# Patient Record
Sex: MALE | Race: WHITE | NOT HISPANIC OR LATINO | URBAN - METROPOLITAN AREA
[De-identification: names, ages, dates, MRNs, and addresses within clinical notes are randomized per-mention and may not be internally consistent; named-entity substitution may affect disease eponyms.]

---

## 2017-03-05 ENCOUNTER — INPATIENT (INPATIENT)
Facility: HOSPITAL | Age: 72
LOS: 2 days | Discharge: HOME | End: 2017-03-08
Attending: HOSPITALIST

## 2017-04-14 ENCOUNTER — INPATIENT (INPATIENT)
Facility: HOSPITAL | Age: 72
LOS: 0 days | Discharge: HOME | End: 2017-04-15
Attending: HOSPITALIST

## 2017-05-18 PROBLEM — Z00.00 ENCOUNTER FOR PREVENTIVE HEALTH EXAMINATION: Status: ACTIVE | Noted: 2017-05-18

## 2017-06-27 DIAGNOSIS — I25.10 ATHEROSCLEROTIC HEART DISEASE OF NATIVE CORONARY ARTERY WITHOUT ANGINA PECTORIS: ICD-10-CM

## 2017-06-27 DIAGNOSIS — R63.4 ABNORMAL WEIGHT LOSS: ICD-10-CM

## 2017-06-27 DIAGNOSIS — R26.81 UNSTEADINESS ON FEET: ICD-10-CM

## 2017-06-27 DIAGNOSIS — Z86.79 PERSONAL HISTORY OF OTHER DISEASES OF THE CIRCULATORY SYSTEM: ICD-10-CM

## 2017-06-27 DIAGNOSIS — E78.5 HYPERLIPIDEMIA, UNSPECIFIED: ICD-10-CM

## 2017-06-27 DIAGNOSIS — R42 DIZZINESS AND GIDDINESS: ICD-10-CM

## 2017-06-27 DIAGNOSIS — Z95.5 PRESENCE OF CORONARY ANGIOPLASTY IMPLANT AND GRAFT: ICD-10-CM

## 2017-06-27 DIAGNOSIS — I25.2 OLD MYOCARDIAL INFARCTION: ICD-10-CM

## 2017-06-27 DIAGNOSIS — R11.2 NAUSEA WITH VOMITING, UNSPECIFIED: ICD-10-CM

## 2017-06-27 DIAGNOSIS — I10 ESSENTIAL (PRIMARY) HYPERTENSION: ICD-10-CM

## 2017-06-28 DIAGNOSIS — Z79.82 LONG TERM (CURRENT) USE OF ASPIRIN: ICD-10-CM

## 2017-06-28 DIAGNOSIS — K56.7 ILEUS, UNSPECIFIED: ICD-10-CM

## 2017-06-28 DIAGNOSIS — E78.5 HYPERLIPIDEMIA, UNSPECIFIED: ICD-10-CM

## 2017-06-28 DIAGNOSIS — A08.4 VIRAL INTESTINAL INFECTION, UNSPECIFIED: ICD-10-CM

## 2017-06-28 DIAGNOSIS — I25.2 OLD MYOCARDIAL INFARCTION: ICD-10-CM

## 2017-06-28 DIAGNOSIS — K56.60 UNSPECIFIED INTESTINAL OBSTRUCTION: ICD-10-CM

## 2017-06-28 DIAGNOSIS — I10 ESSENTIAL (PRIMARY) HYPERTENSION: ICD-10-CM

## 2017-06-28 DIAGNOSIS — K58.9 IRRITABLE BOWEL SYNDROME WITHOUT DIARRHEA: ICD-10-CM

## 2017-09-05 ENCOUNTER — APPOINTMENT (OUTPATIENT)
Dept: CARDIOLOGY | Facility: CLINIC | Age: 72
End: 2017-09-05

## 2017-09-05 VITALS
BODY MASS INDEX: 28.06 KG/M2 | HEIGHT: 70 IN | SYSTOLIC BLOOD PRESSURE: 130 MMHG | DIASTOLIC BLOOD PRESSURE: 80 MMHG | HEART RATE: 76 BPM | WEIGHT: 196 LBS

## 2017-09-05 RX ORDER — ASPIRIN 81 MG
81 TABLET, DELAYED RELEASE (ENTERIC COATED) ORAL DAILY
Refills: 0 | Status: ACTIVE | COMMUNITY

## 2017-12-14 ENCOUNTER — MEDICATION RENEWAL (OUTPATIENT)
Age: 72
End: 2017-12-14

## 2018-01-12 ENCOUNTER — MEDICATION RENEWAL (OUTPATIENT)
Age: 73
End: 2018-01-12

## 2018-01-23 ENCOUNTER — OUTPATIENT (OUTPATIENT)
Dept: OUTPATIENT SERVICES | Facility: HOSPITAL | Age: 73
LOS: 1 days | Discharge: HOME | End: 2018-01-23

## 2018-01-23 DIAGNOSIS — R10.9 UNSPECIFIED ABDOMINAL PAIN: ICD-10-CM

## 2018-02-04 DIAGNOSIS — K56.0 PARALYTIC ILEUS: ICD-10-CM

## 2018-02-04 DIAGNOSIS — I10 ESSENTIAL (PRIMARY) HYPERTENSION: ICD-10-CM

## 2018-02-04 DIAGNOSIS — R07.9 CHEST PAIN, UNSPECIFIED: ICD-10-CM

## 2018-02-04 DIAGNOSIS — E87.0 HYPEROSMOLALITY AND HYPERNATREMIA: ICD-10-CM

## 2018-02-04 DIAGNOSIS — E78.5 HYPERLIPIDEMIA, UNSPECIFIED: ICD-10-CM

## 2018-03-20 ENCOUNTER — OUTPATIENT (OUTPATIENT)
Dept: OUTPATIENT SERVICES | Facility: HOSPITAL | Age: 73
LOS: 1 days | Discharge: HOME | End: 2018-03-20

## 2018-03-20 DIAGNOSIS — M25.511 PAIN IN RIGHT SHOULDER: ICD-10-CM

## 2018-03-20 DIAGNOSIS — M25.512 PAIN IN LEFT SHOULDER: ICD-10-CM

## 2018-03-31 ENCOUNTER — OUTPATIENT (OUTPATIENT)
Dept: OUTPATIENT SERVICES | Facility: HOSPITAL | Age: 73
LOS: 1 days | Discharge: HOME | End: 2018-03-31

## 2018-03-31 DIAGNOSIS — R52 PAIN, UNSPECIFIED: ICD-10-CM

## 2018-05-10 ENCOUNTER — APPOINTMENT (OUTPATIENT)
Dept: CARDIOLOGY | Facility: CLINIC | Age: 73
End: 2018-05-10

## 2018-05-10 VITALS
SYSTOLIC BLOOD PRESSURE: 160 MMHG | WEIGHT: 195 LBS | HEART RATE: 61 BPM | BODY MASS INDEX: 27.92 KG/M2 | DIASTOLIC BLOOD PRESSURE: 70 MMHG | HEIGHT: 70 IN

## 2018-05-10 DIAGNOSIS — Z87.891 PERSONAL HISTORY OF NICOTINE DEPENDENCE: ICD-10-CM

## 2018-10-15 ENCOUNTER — EMERGENCY (EMERGENCY)
Facility: HOSPITAL | Age: 73
LOS: 0 days | Discharge: HOME | End: 2018-10-15
Attending: EMERGENCY MEDICINE | Admitting: EMERGENCY MEDICINE

## 2018-10-15 VITALS
SYSTOLIC BLOOD PRESSURE: 166 MMHG | WEIGHT: 197.98 LBS | RESPIRATION RATE: 18 BRPM | TEMPERATURE: 97 F | OXYGEN SATURATION: 100 % | HEART RATE: 61 BPM | DIASTOLIC BLOOD PRESSURE: 78 MMHG

## 2018-10-15 DIAGNOSIS — Z79.899 OTHER LONG TERM (CURRENT) DRUG THERAPY: ICD-10-CM

## 2018-10-15 DIAGNOSIS — Z79.82 LONG TERM (CURRENT) USE OF ASPIRIN: ICD-10-CM

## 2018-10-15 DIAGNOSIS — R42 DIZZINESS AND GIDDINESS: ICD-10-CM

## 2018-10-15 DIAGNOSIS — R11.10 VOMITING, UNSPECIFIED: ICD-10-CM

## 2018-10-15 LAB
ALBUMIN SERPL ELPH-MCNC: 4.1 G/DL — SIGNIFICANT CHANGE UP (ref 3.5–5.2)
ALP SERPL-CCNC: 101 U/L — SIGNIFICANT CHANGE UP (ref 30–115)
ALT FLD-CCNC: 12 U/L — SIGNIFICANT CHANGE UP (ref 0–41)
ANION GAP SERPL CALC-SCNC: 12 MMOL/L — SIGNIFICANT CHANGE UP (ref 7–14)
APTT BLD: 29.6 SEC — SIGNIFICANT CHANGE UP (ref 27–39.2)
AST SERPL-CCNC: 18 U/L — SIGNIFICANT CHANGE UP (ref 0–41)
BASOPHILS # BLD AUTO: 0.06 K/UL — SIGNIFICANT CHANGE UP (ref 0–0.2)
BASOPHILS NFR BLD AUTO: 0.6 % — SIGNIFICANT CHANGE UP (ref 0–1)
BILIRUB DIRECT SERPL-MCNC: 0.3 MG/DL — HIGH (ref 0–0.2)
BILIRUB INDIRECT FLD-MCNC: 0.7 MG/DL — SIGNIFICANT CHANGE UP (ref 0.2–1.2)
BILIRUB SERPL-MCNC: 1 MG/DL — SIGNIFICANT CHANGE UP (ref 0.2–1.2)
BUN SERPL-MCNC: 14 MG/DL — SIGNIFICANT CHANGE UP (ref 10–20)
CALCIUM SERPL-MCNC: 9.7 MG/DL — SIGNIFICANT CHANGE UP (ref 8.5–10.1)
CHLORIDE SERPL-SCNC: 104 MMOL/L — SIGNIFICANT CHANGE UP (ref 98–110)
CO2 SERPL-SCNC: 26 MMOL/L — SIGNIFICANT CHANGE UP (ref 17–32)
CREAT SERPL-MCNC: 1.3 MG/DL — SIGNIFICANT CHANGE UP (ref 0.7–1.5)
EOSINOPHIL # BLD AUTO: 0.19 K/UL — SIGNIFICANT CHANGE UP (ref 0–0.7)
EOSINOPHIL NFR BLD AUTO: 1.9 % — SIGNIFICANT CHANGE UP (ref 0–8)
GLUCOSE SERPL-MCNC: 148 MG/DL — HIGH (ref 70–99)
HCT VFR BLD CALC: 48.4 % — SIGNIFICANT CHANGE UP (ref 42–52)
HGB BLD-MCNC: 16 G/DL — SIGNIFICANT CHANGE UP (ref 14–18)
IMM GRANULOCYTES NFR BLD AUTO: 0.6 % — HIGH (ref 0.1–0.3)
INR BLD: 1.11 RATIO — SIGNIFICANT CHANGE UP (ref 0.65–1.3)
LACTATE SERPL-SCNC: 1.3 MMOL/L — SIGNIFICANT CHANGE UP (ref 0.5–2.2)
LIDOCAIN IGE QN: 26 U/L — SIGNIFICANT CHANGE UP (ref 7–60)
LYMPHOCYTES # BLD AUTO: 2.2 K/UL — SIGNIFICANT CHANGE UP (ref 1.2–3.4)
LYMPHOCYTES # BLD AUTO: 21.9 % — SIGNIFICANT CHANGE UP (ref 20.5–51.1)
MAGNESIUM SERPL-MCNC: 2.2 MG/DL — SIGNIFICANT CHANGE UP (ref 1.8–2.4)
MCHC RBC-ENTMCNC: 29.7 PG — SIGNIFICANT CHANGE UP (ref 27–31)
MCHC RBC-ENTMCNC: 33.1 G/DL — SIGNIFICANT CHANGE UP (ref 32–37)
MCV RBC AUTO: 90 FL — SIGNIFICANT CHANGE UP (ref 80–94)
MONOCYTES # BLD AUTO: 0.71 K/UL — HIGH (ref 0.1–0.6)
MONOCYTES NFR BLD AUTO: 7.1 % — SIGNIFICANT CHANGE UP (ref 1.7–9.3)
NEUTROPHILS # BLD AUTO: 6.84 K/UL — HIGH (ref 1.4–6.5)
NEUTROPHILS NFR BLD AUTO: 67.9 % — SIGNIFICANT CHANGE UP (ref 42.2–75.2)
NRBC # BLD: 0 /100 WBCS — SIGNIFICANT CHANGE UP (ref 0–0)
PLATELET # BLD AUTO: 220 K/UL — SIGNIFICANT CHANGE UP (ref 130–400)
POTASSIUM SERPL-MCNC: 4.1 MMOL/L — SIGNIFICANT CHANGE UP (ref 3.5–5)
POTASSIUM SERPL-SCNC: 4.1 MMOL/L — SIGNIFICANT CHANGE UP (ref 3.5–5)
PROT SERPL-MCNC: 6.6 G/DL — SIGNIFICANT CHANGE UP (ref 6–8)
PROTHROM AB SERPL-ACNC: 12 SEC — SIGNIFICANT CHANGE UP (ref 9.95–12.87)
RBC # BLD: 5.38 M/UL — SIGNIFICANT CHANGE UP (ref 4.7–6.1)
RBC # FLD: 13.4 % — SIGNIFICANT CHANGE UP (ref 11.5–14.5)
SODIUM SERPL-SCNC: 142 MMOL/L — SIGNIFICANT CHANGE UP (ref 135–146)
TROPONIN T SERPL-MCNC: <0.01 NG/ML — SIGNIFICANT CHANGE UP
WBC # BLD: 10.06 K/UL — SIGNIFICANT CHANGE UP (ref 4.8–10.8)
WBC # FLD AUTO: 10.06 K/UL — SIGNIFICANT CHANGE UP (ref 4.8–10.8)

## 2018-10-15 RX ORDER — MECLIZINE HCL 12.5 MG
25 TABLET ORAL ONCE
Qty: 0 | Refills: 0 | Status: COMPLETED | OUTPATIENT
Start: 2018-10-15 | End: 2018-10-15

## 2018-10-15 RX ORDER — MECLIZINE HCL 12.5 MG
1 TABLET ORAL
Qty: 10 | Refills: 0 | OUTPATIENT
Start: 2018-10-15

## 2018-10-15 RX ORDER — SODIUM CHLORIDE 9 MG/ML
1000 INJECTION, SOLUTION INTRAVENOUS ONCE
Qty: 0 | Refills: 0 | Status: COMPLETED | OUTPATIENT
Start: 2018-10-15 | End: 2018-10-15

## 2018-10-15 RX ORDER — ONDANSETRON 8 MG/1
4 TABLET, FILM COATED ORAL ONCE
Qty: 0 | Refills: 0 | Status: COMPLETED | OUTPATIENT
Start: 2018-10-15 | End: 2018-10-15

## 2018-10-15 RX ADMIN — SODIUM CHLORIDE 1000 MILLILITER(S): 9 INJECTION, SOLUTION INTRAVENOUS at 16:17

## 2018-10-15 RX ADMIN — ONDANSETRON 4 MILLIGRAM(S): 8 TABLET, FILM COATED ORAL at 16:17

## 2018-10-15 RX ADMIN — Medication 25 MILLIGRAM(S): at 16:17

## 2018-10-15 NOTE — ED ADULT NURSE NOTE - NSIMPLEMENTINTERV_GEN_ALL_ED
Implemented All Fall with Harm Risk Interventions:  [facility] to call system. Call bell, personal items and telephone within reach. Instruct patient to call for assistance. Room bathroom lighting operational. Non-slip footwear when patient is off stretcher. Physically safe environment: no spills, clutter or unnecessary equipment. Stretcher in lowest position, wheels locked, appropriate side rails in place. Provide visual cue, wrist band, yellow gown, etc. Monitor gait and stability. Monitor for mental status changes and reorient to person, place, and time. Review medications for side effects contributing to fall risk. Reinforce activity limits and safety measures with patient and family. Provide visual clues: red socks.

## 2018-10-15 NOTE — ED PROVIDER NOTE - ATTENDING CONTRIBUTION TO CARE
sudden onset vertigo that occurred at rest lasted for few min with vomiting but no chest pain or headache, a month prior to this he had similar symptoms and was evaluated by neurology with dr rosales where he had a nml MRI/MRA/CT scan and tilt table test which was confirmed after discussion with dr rosales, now he states symptoms have resolved, on exam his gait is nml, nml cn, nml sensation and sternght, negative rhombergs and no n ystagmus, will get head ct given vomiting, check labs. if work up negative this isunlikely posterior stroke given recent extensive work up and patient to fu with dr rosales.

## 2018-10-15 NOTE — ED PROVIDER NOTE - MEDICAL DECISION MAKING DETAILS
evaluated vertigo, likely peripheral, extensive work up prior month, ct negative here and symptoms resolved.

## 2018-10-15 NOTE — ED PROVIDER NOTE - OBJECTIVE STATEMENT
74y/o M w/ hx of vertigo with work MRI/MRA/CT and tilt table by Dr. Neil-neuro presents from sudden onset vertigo occurred at rest lasted for few minutes with nb vomiting. no cp or sob or headachea.

## 2018-10-15 NOTE — ED PROVIDER NOTE - PHYSICAL EXAMINATION
CONSTITUTIONAL: Well-developed; well-nourished; in no acute distress, nontoxic appearing  SKIN: skin exam is warm and dry,  HEAD: Normocephalic; atraumatic.  EYES: PERRL, 3 mm bilateral, no nystagmus, EOM intact; conjunctiva and sclera clear.  ENT: MMM, no nasal congestion  NECK: Supple; non tender.  ROM intact.  CARD: S1, S2 normal, no murmur  RESP: No wheezes, rales or rhonchi. Good air movement bilaterally  ABD: soft; non-distended; non-tender. No Rebound, No guarding  EXT: Normal ROM. No cyanosis or edema. Dp Pulses intact.   NEURO: awake, alert, following commands, oriented, grossly unremarkable. No Focal deficits. GCS 15. gait is nml, nml cn 2-12, negative rhombers, no nystagmus, nml finger to nose,   PSYCH: Cooperative, appropriate.

## 2018-11-29 ENCOUNTER — APPOINTMENT (OUTPATIENT)
Dept: CARDIOLOGY | Facility: CLINIC | Age: 73
End: 2018-11-29

## 2018-11-29 VITALS
SYSTOLIC BLOOD PRESSURE: 140 MMHG | BODY MASS INDEX: 27.77 KG/M2 | HEIGHT: 70 IN | WEIGHT: 194 LBS | HEART RATE: 62 BPM | DIASTOLIC BLOOD PRESSURE: 80 MMHG

## 2018-11-29 PROBLEM — I21.9 ACUTE MYOCARDIAL INFARCTION, UNSPECIFIED: Chronic | Status: ACTIVE | Noted: 2018-10-15

## 2018-11-29 PROBLEM — E78.00 PURE HYPERCHOLESTEROLEMIA, UNSPECIFIED: Chronic | Status: ACTIVE | Noted: 2018-10-15

## 2018-11-29 NOTE — REASON FOR VISIT
[Initial Evaluation] : an initial evaluation of [FreeTextEntry2] : cad [FreeTextEntry1] : 1995 patient had an mi\par followed by dr. lopez\par last nuclear exam was january 2017\par last echo was done approx 1/2017\par pt has a lbbb\par carotid studiews showed mild disease\par denies chest pain\par class 2 sob\par no h/o chf\par on appropriate medical therapy\par \par s/p stent placement in lower abdomen\par \par no new complaints since last visit\par stable.\par \par no new cardiac symptoms\par pre-op for urological surgery\par low risk \par no further testing needed

## 2019-03-18 ENCOUNTER — EMERGENCY (EMERGENCY)
Facility: HOSPITAL | Age: 74
LOS: 0 days | Discharge: HOME | End: 2019-03-18
Attending: EMERGENCY MEDICINE | Admitting: EMERGENCY MEDICINE

## 2019-03-18 VITALS
WEIGHT: 190.04 LBS | SYSTOLIC BLOOD PRESSURE: 205 MMHG | RESPIRATION RATE: 19 BRPM | TEMPERATURE: 96 F | OXYGEN SATURATION: 98 % | DIASTOLIC BLOOD PRESSURE: 106 MMHG | HEART RATE: 79 BPM

## 2019-03-18 VITALS
DIASTOLIC BLOOD PRESSURE: 82 MMHG | OXYGEN SATURATION: 98 % | SYSTOLIC BLOOD PRESSURE: 178 MMHG | RESPIRATION RATE: 18 BRPM | TEMPERATURE: 97 F

## 2019-03-18 DIAGNOSIS — I25.2 OLD MYOCARDIAL INFARCTION: ICD-10-CM

## 2019-03-18 DIAGNOSIS — R20.0 ANESTHESIA OF SKIN: ICD-10-CM

## 2019-03-18 DIAGNOSIS — Z98.890 OTHER SPECIFIED POSTPROCEDURAL STATES: ICD-10-CM

## 2019-03-18 DIAGNOSIS — Z79.82 LONG TERM (CURRENT) USE OF ASPIRIN: ICD-10-CM

## 2019-03-18 DIAGNOSIS — R42 DIZZINESS AND GIDDINESS: ICD-10-CM

## 2019-03-18 DIAGNOSIS — I10 ESSENTIAL (PRIMARY) HYPERTENSION: ICD-10-CM

## 2019-03-18 DIAGNOSIS — Z87.891 PERSONAL HISTORY OF NICOTINE DEPENDENCE: ICD-10-CM

## 2019-03-18 DIAGNOSIS — Z79.899 OTHER LONG TERM (CURRENT) DRUG THERAPY: ICD-10-CM

## 2019-03-18 DIAGNOSIS — E78.00 PURE HYPERCHOLESTEROLEMIA, UNSPECIFIED: ICD-10-CM

## 2019-03-18 DIAGNOSIS — Z98.890 OTHER SPECIFIED POSTPROCEDURAL STATES: Chronic | ICD-10-CM

## 2019-03-18 LAB
ALBUMIN SERPL ELPH-MCNC: 3.9 G/DL — SIGNIFICANT CHANGE UP (ref 3.5–5.2)
ALP SERPL-CCNC: 91 U/L — SIGNIFICANT CHANGE UP (ref 30–115)
ALT FLD-CCNC: 12 U/L — SIGNIFICANT CHANGE UP (ref 0–41)
ANION GAP SERPL CALC-SCNC: 6 MMOL/L — LOW (ref 7–14)
AST SERPL-CCNC: 16 U/L — SIGNIFICANT CHANGE UP (ref 0–41)
BASOPHILS # BLD AUTO: 0.06 K/UL — SIGNIFICANT CHANGE UP (ref 0–0.2)
BASOPHILS NFR BLD AUTO: 0.8 % — SIGNIFICANT CHANGE UP (ref 0–1)
BILIRUB SERPL-MCNC: 0.5 MG/DL — SIGNIFICANT CHANGE UP (ref 0.2–1.2)
BUN SERPL-MCNC: 15 MG/DL — SIGNIFICANT CHANGE UP (ref 10–20)
CALCIUM SERPL-MCNC: 9.5 MG/DL — SIGNIFICANT CHANGE UP (ref 8.5–10.1)
CHLORIDE SERPL-SCNC: 104 MMOL/L — SIGNIFICANT CHANGE UP (ref 98–110)
CO2 SERPL-SCNC: 29 MMOL/L — SIGNIFICANT CHANGE UP (ref 17–32)
CREAT SERPL-MCNC: 1.2 MG/DL — SIGNIFICANT CHANGE UP (ref 0.7–1.5)
EOSINOPHIL # BLD AUTO: 0.16 K/UL — SIGNIFICANT CHANGE UP (ref 0–0.7)
EOSINOPHIL NFR BLD AUTO: 2 % — SIGNIFICANT CHANGE UP (ref 0–8)
GLUCOSE SERPL-MCNC: 106 MG/DL — HIGH (ref 70–99)
HCT VFR BLD CALC: 49.3 % — SIGNIFICANT CHANGE UP (ref 42–52)
HGB BLD-MCNC: 16 G/DL — SIGNIFICANT CHANGE UP (ref 14–18)
IMM GRANULOCYTES NFR BLD AUTO: 0.4 % — HIGH (ref 0.1–0.3)
LYMPHOCYTES # BLD AUTO: 1.58 K/UL — SIGNIFICANT CHANGE UP (ref 1.2–3.4)
LYMPHOCYTES # BLD AUTO: 20 % — LOW (ref 20.5–51.1)
MCHC RBC-ENTMCNC: 29.5 PG — SIGNIFICANT CHANGE UP (ref 27–31)
MCHC RBC-ENTMCNC: 32.5 G/DL — SIGNIFICANT CHANGE UP (ref 32–37)
MCV RBC AUTO: 90.8 FL — SIGNIFICANT CHANGE UP (ref 80–94)
MONOCYTES # BLD AUTO: 0.56 K/UL — SIGNIFICANT CHANGE UP (ref 0.1–0.6)
MONOCYTES NFR BLD AUTO: 7.1 % — SIGNIFICANT CHANGE UP (ref 1.7–9.3)
NEUTROPHILS # BLD AUTO: 5.5 K/UL — SIGNIFICANT CHANGE UP (ref 1.4–6.5)
NEUTROPHILS NFR BLD AUTO: 69.7 % — SIGNIFICANT CHANGE UP (ref 42.2–75.2)
NRBC # BLD: 0 /100 WBCS — SIGNIFICANT CHANGE UP (ref 0–0)
PLATELET # BLD AUTO: 194 K/UL — SIGNIFICANT CHANGE UP (ref 130–400)
POTASSIUM SERPL-MCNC: 4.3 MMOL/L — SIGNIFICANT CHANGE UP (ref 3.5–5)
POTASSIUM SERPL-SCNC: 4.3 MMOL/L — SIGNIFICANT CHANGE UP (ref 3.5–5)
PROT SERPL-MCNC: 6.4 G/DL — SIGNIFICANT CHANGE UP (ref 6–8)
RBC # BLD: 5.43 M/UL — SIGNIFICANT CHANGE UP (ref 4.7–6.1)
RBC # FLD: 13.5 % — SIGNIFICANT CHANGE UP (ref 11.5–14.5)
SODIUM SERPL-SCNC: 139 MMOL/L — SIGNIFICANT CHANGE UP (ref 135–146)
TROPONIN T SERPL-MCNC: <0.01 NG/ML — SIGNIFICANT CHANGE UP
TROPONIN T SERPL-MCNC: <0.01 NG/ML — SIGNIFICANT CHANGE UP
WBC # BLD: 7.89 K/UL — SIGNIFICANT CHANGE UP (ref 4.8–10.8)
WBC # FLD AUTO: 7.89 K/UL — SIGNIFICANT CHANGE UP (ref 4.8–10.8)

## 2019-03-18 RX ORDER — ATORVASTATIN CALCIUM 80 MG/1
1 TABLET, FILM COATED ORAL
Qty: 0 | Refills: 0 | COMMUNITY

## 2019-03-18 RX ORDER — OMEPRAZOLE 10 MG/1
1 CAPSULE, DELAYED RELEASE ORAL
Qty: 0 | Refills: 0 | COMMUNITY

## 2019-03-18 NOTE — ED PROVIDER NOTE - CARE PROVIDER_API CALL
Jeff Neil)  Neurology  [address] [place] [address]  Phone: [phone]  Fax: [phone]  Follow Up Time:     Ivan Lopez)  Cardiovascular Disease; Internal Medicine; Interventional Cardiology  [address] [place] [id number]  Phone: [phone]  Fax: [phone]  Follow Up Time:     Bartolome Guerin)  Internal Medicine  [address] [place] [address]  Phone: [phone]  Fax: [phone]  Follow Up Time:

## 2019-03-18 NOTE — ED ADULT NURSE NOTE - NSIMPLEMENTINTERV_GEN_ALL_ED
Implemented All Fall Risk Interventions:  [place] to call system. Call bell, personal items and telephone within reach. Instruct patient to call for assistance. Room bathroom lighting operational. Non-slip footwear when patient is off stretcher. Physically safe environment: no spills, clutter or unnecessary equipment. Stretcher in lowest position, wheels locked, appropriate side rails in place. Provide visual cue, wrist band, yellow gown, etc. Monitor gait and stability. Monitor for mental status changes and reorient to person, place, and time. Review medications for side effects contributing to fall risk. Reinforce activity limits and safety measures with patient and family.

## 2019-03-18 NOTE — ED PROVIDER NOTE - PLAN OF CARE
Resolution of symptoms. ct head shows age indeterminate infarct. Patient is already on lipitor 80mg and asa 81. CEx2 are negative. Advised to follow up with cardiology and neurology as outpatient.

## 2019-03-18 NOTE — ED PROVIDER NOTE - OBJECTIVE STATEMENT
74Y M with pmh of MI in 1993, HTN and dld presents to the ED with L arm numbness and lightheadedness since 5AM. As per patient the symptom started right after he woke up. He can not recall if he slept on his arm. The symptoms are associated with nausea and lightheadedness but he is able to ambulate. Patient denies any chest pain, sob, vomiting, vision problems, weakness or headache.

## 2019-03-18 NOTE — ED PROVIDER NOTE - CLINICAL SUMMARY MEDICAL DECISION MAKING FREE TEXT BOX
Patient presents with left arm numbness that has improved during intial evaluation patient symptoms abated.  He denies any chest pain or shortness of breath physical exam normal we obtained labs ct head negative discussed case with neurology. Patient presents with left arm numbness that has improved during intial evaluation patient symptoms abated.  He denies any chest pain or shortness of breath physical exam normal we obtained labs ct head showed age indeterminate lacunar infarct and multiple attempts were made to discussed case with neurology.

## 2019-03-18 NOTE — ED PROVIDER NOTE - CARE PLAN
Principal Discharge DX:	Numbness of arm  Goal:	Resolution of symptoms.  Assessment and plan of treatment:	ct head shows age indeterminate infarct. Patient is already on lipitor 80mg and asa 81. CEx2 are negative. Advised to follow up with cardiology and neurology as outpatient.

## 2019-03-18 NOTE — ED PROVIDER NOTE - CARE PROVIDERS DIRECT ADDRESSES
,DirectAddress_Unknown,[email or web address] [address].[facility]

## 2019-03-18 NOTE — ED PROVIDER NOTE - PHYSICAL EXAMINATION
GENERAL: NAD, speaks in full sentences, no signs of respiratory distress  HEAD:  Atraumatic, Normocephalic  EYES: EOMI, conjunctiva clear  CHEST/LUNG: Clear to auscultation bilaterally; No wheeze; No crackles; No accessory muscles used  HEART: Regular rate and rhythm; No murmurs;   ABDOMEN: Soft, Nontender, Nondistended; No guarding  EXTREMITIES:  No cyanosis or edema  PSYCH: AAOx3  NEUROLOGY: non-focal  SKIN: No rashes or lesions

## 2019-03-18 NOTE — ED PROVIDER NOTE - PROVIDER TOKENS
PROVIDER:[TOKEN:[[id number]]],PROVIDER:[TOKEN:[[id number]]],PROVIDER:[TOKEN:[[id number]]]

## 2019-03-18 NOTE — ED PROVIDER NOTE - PROGRESS NOTE DETAILS
Blood work shows no abnormalities. CXR has no acute cardiopulmonary abnormality. CT head shows age indeterminate lacunar infarct. Patient is already on Lipitor 80mg and ASA 81. Arm numbness has improved. BP has also come down to 167/84. Blood work shows no abnormalities. CXR has no acute cardiopulmonary abnormality. CT head shows age indeterminate lacunar infarct. Patient is already on Lipitor 80mg and ASA 81. Arm numbness has improved. BP has also come down to 167/84. Cardiac enzymes are negative x2. Will discharge patient. Blood work shows no abnormalities. CXR has no acute cardiopulmonary abnormality. CT head shows age indeterminate lacunar infarct. Patient is already on Lipitor 80mg and ASA 81. Arm numbness has improved. BP has also come down to 167/84. Cardiac enzymes are negative x2. Patient offered admission but he refused. Will discharge patient.

## 2019-03-18 NOTE — ED PROVIDER NOTE - ATTENDING CONTRIBUTION TO CARE
I was present for and supervised the key and critical aspects of the procedures performed during the care of the patient. Patient presents for evaluation of left arm numbness that has improved patient is currently asymptomatic he denies any headache, visual changes, chest pain, shortness of breath abdominal pain, back pain or focal deficits.    On physical exam he is nc/at perrla eomi oropharynx clear cta b/l, rrr s1s2 noted abd-soft nt ndbs+ ext from   Neuro- patient is awake alert with from, full strength, sensation intact, he is able move all extremities well no focal deficits noted.    A/P- we obtained ekg, labs, ct head, given patients history I am more concerned with potential cardiac cause rather than neuro.  I will continue to monitor.

## 2019-03-18 NOTE — ED PROVIDER NOTE - NS ED ROS FT
CONSTITUTIONAL: No weakness, fevers or chills  EYES/ENT: No visual changes;  lightheadedness    NECK: No pain or stiffness  RESPIRATORY: No cough, wheezing, hemoptysis; No shortness of breath  CARDIOVASCULAR: No chest pain or palpitations  GASTROINTESTINAL: No abdominal or epigastric pain. No nausea, vomiting, or hematemesis; No diarrhea or constipation.  GENITOURINARY: No dysuria, frequency or hematuria  NEUROLOGICAL: L arm numbness with shoulder pain  SKIN: No itching, rashes

## 2019-03-19 ENCOUNTER — MEDICATION RENEWAL (OUTPATIENT)
Age: 74
End: 2019-03-19

## 2019-04-02 RX ORDER — MELOXICAM 15 MG/1
1 TABLET ORAL
Qty: 30 | Refills: 1
Start: 2019-04-02 | End: 2019-05-31

## 2019-04-03 PROBLEM — I10 ESSENTIAL (PRIMARY) HYPERTENSION: Chronic | Status: ACTIVE | Noted: 2019-03-18

## 2019-04-09 ENCOUNTER — OUTPATIENT (OUTPATIENT)
Dept: OUTPATIENT SERVICES | Facility: HOSPITAL | Age: 74
LOS: 1 days | Discharge: HOME | End: 2019-04-09

## 2019-04-09 DIAGNOSIS — Z98.890 OTHER SPECIFIED POSTPROCEDURAL STATES: Chronic | ICD-10-CM

## 2019-04-09 DIAGNOSIS — M54.89 OTHER DORSALGIA: ICD-10-CM

## 2019-04-12 NOTE — ED PROVIDER NOTE - NS ED ROS FT
Medications:  Continuous Infusions:  Scheduled Meds:   aspirin  81 mg Oral Daily    atorvastatin  80 mg Oral Daily    clopidogrel  75 mg Oral Daily    [START ON 4/12/2019] losartan  50 mg Oral Daily    [START ON 4/12/2019] metoprolol succinate  25 mg Oral Daily    mupirocin  1 g Nasal BID     PRN Meds:acetaminophen, albuterol-ipratropium, hydrALAZINE, labetalol, oxyCODONE, oxyCODONE     Objective:     Vital Signs (Most Recent):  Temp: 98.1 °F (36.7 °C) (04/11/19 1910)  Pulse: 76 (04/11/19 1941)  Resp: 18 (04/11/19 1910)  BP: (!) 155/70 (04/11/19 1910)  SpO2: (!) 91 % (04/11/19 1941) Vital Signs (24h Range):  Temp:  [97.3 °F (36.3 °C)-98.7 °F (37.1 °C)] 98.1 °F (36.7 °C)  Pulse:  [53-81] 76  Resp:  [14-23] 18  SpO2:  [87 %-95 %] 91 %  BP: ()/(48-70) 155/70  Arterial Line BP: ()/(41-51) 92/41     Date 04/11/19 0700 - 04/12/19 0659   Shift 0733-5600 1110-2752 4417-4132 24 Hour Total   INTAKE   P.O. 290 60  350   I.V.(mL/kg) 550(9.9)   550(9.9)   Shift Total(mL/kg) 840(15.2) 60(1.1)  900(16.3)   OUTPUT   Urine(mL/kg/hr) 10(0) 150  160   Shift Total(mL/kg) 10(0.2) 150(2.7)  160(2.9)   Weight (kg) 55.3 55.3 55.3 55.3       Physical Exam   Constitutional: She is oriented to person, place, and time. She appears well-developed.   Neck: Normal range of motion. Neck supple. No JVD present. No tracheal deviation present.   Surgical island dressing to right neck clean, dry, and intact   Cardiovascular: Normal rate and regular rhythm.   Pulmonary/Chest: Breath sounds normal. No respiratory distress.   Abdominal: Soft. She exhibits no distension and no mass. There is no tenderness. There is no rebound and no guarding.   Musculoskeletal: She exhibits no edema.   Neurological: She is alert and oriented to person, place, and time. No cranial nerve deficit or sensory deficit.   Skin: Skin is warm and dry.       Significant Labs:  CBC:   Recent Labs   Lab 04/11/19  0539   WBC 7.88   RBC 3.54*   HGB 10.0*   HCT 32.3*       MCV 91   MCH 28.2   MCHC 31.0*     CMP:   Recent Labs   Lab 04/11/19  0539   *   CALCIUM 8.1*      K 3.8   CO2 21*   *   BUN 14   CREATININE 1.3       Significant Diagnostics:  I have reviewed all pertinent imaging results/findings within the past 24 hours.   Constitutional: (-) fever  Eyes/ENT: (-) blurry vision, (-) epistaxis  Cardiovascular: (-) chest pain, (-) syncope  Respiratory: (-) cough, (-) shortness of breath  Gastrointestinal:+ vomiting, (-) diarrhea  Musculoskeletal: (-) neck pain, (-) back pain, (-) joint pain  Integumentary: (-) rash, (-) edema  Neurological: (-) headache, (-) altered mental status +vertigo  Psychiatric: (-) hallucinations  Allergic/Immunologic: (-) pruritus

## 2019-05-01 ENCOUNTER — RX RENEWAL (OUTPATIENT)
Age: 74
End: 2019-05-01

## 2019-09-18 ENCOUNTER — OTHER (OUTPATIENT)
Age: 74
End: 2019-09-18

## 2019-09-18 ENCOUNTER — APPOINTMENT (OUTPATIENT)
Dept: CARDIOLOGY | Facility: CLINIC | Age: 74
End: 2019-09-18
Payer: MEDICARE

## 2019-09-18 VITALS
SYSTOLIC BLOOD PRESSURE: 136 MMHG | WEIGHT: 189 LBS | HEIGHT: 70 IN | BODY MASS INDEX: 27.06 KG/M2 | HEART RATE: 52 BPM | DIASTOLIC BLOOD PRESSURE: 80 MMHG

## 2019-09-18 PROCEDURE — 99214 OFFICE O/P EST MOD 30 MIN: CPT

## 2019-09-18 PROCEDURE — 93000 ELECTROCARDIOGRAM COMPLETE: CPT

## 2019-09-18 RX ORDER — OMEPRAZOLE 40 MG/1
40 CAPSULE, DELAYED RELEASE ORAL
Qty: 90 | Refills: 3 | Status: ACTIVE | COMMUNITY
Start: 1900-01-01 | End: 1900-01-01

## 2019-09-18 NOTE — PHYSICAL EXAM
[General Appearance - Well Developed] : well developed [Normal Appearance] : normal appearance [Well Groomed] : well groomed [General Appearance - Well Nourished] : well nourished [No Deformities] : no deformities [General Appearance - In No Acute Distress] : no acute distress [Eyelids - No Xanthelasma] : the eyelids demonstrated no xanthelasmas [Normal Conjunctiva] : the conjunctiva exhibited no abnormalities [Normal Oral Mucosa] : normal oral mucosa [No Oral Pallor] : no oral pallor [No Oral Cyanosis] : no oral cyanosis [Heart Rate And Rhythm] : heart rate and rhythm were normal [Heart Sounds] : normal S1 and S2 [Murmurs] : no murmurs present [Exaggerated Use Of Accessory Muscles For Inspiration] : no accessory muscle use [Respiration, Rhythm And Depth] : normal respiratory rhythm and effort [Auscultation Breath Sounds / Voice Sounds] : lungs were clear to auscultation bilaterally [Abdomen Soft] : soft [Abdomen Tenderness] : non-tender [Abdomen Mass (___ Cm)] : no abdominal mass palpated [Abnormal Walk] : normal gait [Nail Clubbing] : no clubbing of the fingernails [Gait - Sufficient For Exercise Testing] : the gait was sufficient for exercise testing [Petechial Hemorrhages (___cm)] : no petechial hemorrhages [Cyanosis, Localized] : no localized cyanosis [] : no rash [Skin Color & Pigmentation] : normal skin color and pigmentation [No Venous Stasis] : no venous stasis [Skin Lesions] : no skin lesions [No Xanthoma] : no  xanthoma was observed [No Skin Ulcers] : no skin ulcer [Oriented To Time, Place, And Person] : oriented to person, place, and time [Mood] : the mood was normal [No Anxiety] : not feeling anxious [Affect] : the affect was normal

## 2019-09-18 NOTE — REASON FOR VISIT
[Initial Evaluation] : an initial evaluation of [FreeTextEntry2] : cad [FreeTextEntry1] : 1995 patient had an mi\par followed by dr. lopez\par last nuclear exam was january 2017\par last echo was done approx 1/2017\par pt has a lbbb\par carotid studiews showed mild disease\par denies chest pain\par class 2 sob\par no h/o chf\par on appropriate medical therapy\par \par s/p stent placement in lower abdomen\par \par no new complaints since last visit\par stable.\par \par no new cardiac symptoms\par to see dr. fajardo for gi problem\par seeing dr. coronel for prostate

## 2020-01-01 ENCOUNTER — APPOINTMENT (OUTPATIENT)
Dept: CARDIOLOGY | Facility: CLINIC | Age: 75
End: 2020-01-01

## 2020-01-01 ENCOUNTER — EMERGENCY (EMERGENCY)
Facility: HOSPITAL | Age: 75
LOS: 0 days | Discharge: HOME | End: 2020-08-18
Attending: EMERGENCY MEDICINE | Admitting: EMERGENCY MEDICINE
Payer: MEDICARE

## 2020-01-01 ENCOUNTER — APPOINTMENT (OUTPATIENT)
Dept: CARDIOLOGY | Facility: CLINIC | Age: 75
End: 2020-01-01
Payer: MEDICARE

## 2020-01-01 ENCOUNTER — INPATIENT (INPATIENT)
Facility: HOSPITAL | Age: 75
LOS: 17 days | End: 2020-11-28
Attending: INTERNAL MEDICINE | Admitting: INTERNAL MEDICINE
Payer: MEDICARE

## 2020-01-01 VITALS
WEIGHT: 190 LBS | BODY MASS INDEX: 27.26 KG/M2 | DIASTOLIC BLOOD PRESSURE: 80 MMHG | SYSTOLIC BLOOD PRESSURE: 142 MMHG | HEART RATE: 66 BPM | TEMPERATURE: 97.9 F

## 2020-01-01 VITALS
HEART RATE: 79 BPM | RESPIRATION RATE: 16 BRPM | HEIGHT: 70 IN | DIASTOLIC BLOOD PRESSURE: 89 MMHG | WEIGHT: 184.97 LBS | TEMPERATURE: 97 F | SYSTOLIC BLOOD PRESSURE: 169 MMHG | OXYGEN SATURATION: 97 %

## 2020-01-01 VITALS — WEIGHT: 195.11 LBS | HEIGHT: 70 IN

## 2020-01-01 VITALS — OXYGEN SATURATION: 87 % | SYSTOLIC BLOOD PRESSURE: 120 MMHG | HEART RATE: 108 BPM | DIASTOLIC BLOOD PRESSURE: 58 MMHG

## 2020-01-01 DIAGNOSIS — E66.9 OBESITY, UNSPECIFIED: ICD-10-CM

## 2020-01-01 DIAGNOSIS — Z79.82 LONG TERM (CURRENT) USE OF ASPIRIN: ICD-10-CM

## 2020-01-01 DIAGNOSIS — I25.2 OLD MYOCARDIAL INFARCTION: ICD-10-CM

## 2020-01-01 DIAGNOSIS — Z98.890 OTHER SPECIFIED POSTPROCEDURAL STATES: Chronic | ICD-10-CM

## 2020-01-01 DIAGNOSIS — I11.0 HYPERTENSIVE HEART DISEASE WITH HEART FAILURE: ICD-10-CM

## 2020-01-01 DIAGNOSIS — R04.0 EPISTAXIS: ICD-10-CM

## 2020-01-01 DIAGNOSIS — I25.10 ATHEROSCLEROTIC HEART DISEASE OF NATIVE CORONARY ARTERY WITHOUT ANGINA PECTORIS: ICD-10-CM

## 2020-01-01 DIAGNOSIS — Z00.6 ENCOUNTER FOR EXAMINATION FOR NORMAL COMPARISON AND CONTROL IN CLINICAL RESEARCH PROGRAM: ICD-10-CM

## 2020-01-01 DIAGNOSIS — J96.01 ACUTE RESPIRATORY FAILURE WITH HYPOXIA: ICD-10-CM

## 2020-01-01 DIAGNOSIS — N17.9 ACUTE KIDNEY FAILURE, UNSPECIFIED: ICD-10-CM

## 2020-01-01 DIAGNOSIS — E87.5 HYPERKALEMIA: ICD-10-CM

## 2020-01-01 DIAGNOSIS — E78.5 HYPERLIPIDEMIA, UNSPECIFIED: ICD-10-CM

## 2020-01-01 DIAGNOSIS — R10.9 UNSPECIFIED ABDOMINAL PAIN: ICD-10-CM

## 2020-01-01 DIAGNOSIS — I48.91 UNSPECIFIED ATRIAL FIBRILLATION: ICD-10-CM

## 2020-01-01 DIAGNOSIS — U07.1 COVID-19: ICD-10-CM

## 2020-01-01 DIAGNOSIS — Z79.899 OTHER LONG TERM (CURRENT) DRUG THERAPY: ICD-10-CM

## 2020-01-01 DIAGNOSIS — I10 ESSENTIAL (PRIMARY) HYPERTENSION: ICD-10-CM

## 2020-01-01 DIAGNOSIS — Z66 DO NOT RESUSCITATE: ICD-10-CM

## 2020-01-01 DIAGNOSIS — I47.2 VENTRICULAR TACHYCARDIA: ICD-10-CM

## 2020-01-01 DIAGNOSIS — Z87.442 PERSONAL HISTORY OF URINARY CALCULI: ICD-10-CM

## 2020-01-01 DIAGNOSIS — I25.10 ATHEROSCLEROTIC HEART DISEASE OF NATIVE CORONARY ARTERY W/OUT ANGINA PECTORIS: ICD-10-CM

## 2020-01-01 DIAGNOSIS — M54.2 CERVICALGIA: ICD-10-CM

## 2020-01-01 DIAGNOSIS — Z95.5 PRESENCE OF CORONARY ANGIOPLASTY IMPLANT AND GRAFT: ICD-10-CM

## 2020-01-01 DIAGNOSIS — Z87.19 PERSONAL HISTORY OF OTHER DISEASES OF THE DIGESTIVE SYSTEM: ICD-10-CM

## 2020-01-01 DIAGNOSIS — A41.01 SEPSIS DUE TO METHICILLIN SUSCEPTIBLE STAPHYLOCOCCUS AUREUS: ICD-10-CM

## 2020-01-01 DIAGNOSIS — I21.9 ACUTE MYOCARDIAL INFARCTION, UNSPECIFIED: ICD-10-CM

## 2020-01-01 DIAGNOSIS — R10.84 GENERALIZED ABDOMINAL PAIN: ICD-10-CM

## 2020-01-01 DIAGNOSIS — I50.22 CHRONIC SYSTOLIC (CONGESTIVE) HEART FAILURE: ICD-10-CM

## 2020-01-01 DIAGNOSIS — J12.89 OTHER VIRAL PNEUMONIA: ICD-10-CM

## 2020-01-01 DIAGNOSIS — R65.21 SEVERE SEPSIS WITH SEPTIC SHOCK: ICD-10-CM

## 2020-01-01 LAB
-  AMPICILLIN/SULBACTAM: SIGNIFICANT CHANGE UP
-  CEFAZOLIN: SIGNIFICANT CHANGE UP
-  CLINDAMYCIN: SIGNIFICANT CHANGE UP
-  ERYTHROMYCIN: SIGNIFICANT CHANGE UP
-  GENTAMICIN: SIGNIFICANT CHANGE UP
-  OXACILLIN: SIGNIFICANT CHANGE UP
-  PENICILLIN: SIGNIFICANT CHANGE UP
-  RIFAMPIN: SIGNIFICANT CHANGE UP
-  TETRACYCLINE: SIGNIFICANT CHANGE UP
-  TRIMETHOPRIM/SULFAMETHOXAZOLE: SIGNIFICANT CHANGE UP
-  VANCOMYCIN: SIGNIFICANT CHANGE UP
A-TUMOR NECROSIS FACT SERPL-MCNC: 19.5 PG/ML — HIGH
ALBUMIN SERPL ELPH-MCNC: 2.4 G/DL — LOW (ref 3.5–5.2)
ALBUMIN SERPL ELPH-MCNC: 2.5 G/DL — LOW (ref 3.5–5.2)
ALBUMIN SERPL ELPH-MCNC: 2.5 G/DL — LOW (ref 3.5–5.2)
ALBUMIN SERPL ELPH-MCNC: 2.7 G/DL — LOW (ref 3.5–5.2)
ALBUMIN SERPL ELPH-MCNC: 2.7 G/DL — LOW (ref 3.5–5.2)
ALBUMIN SERPL ELPH-MCNC: 2.9 G/DL — LOW (ref 3.5–5.2)
ALBUMIN SERPL ELPH-MCNC: 2.9 G/DL — LOW (ref 3.5–5.2)
ALBUMIN SERPL ELPH-MCNC: 3 G/DL — LOW (ref 3.5–5.2)
ALBUMIN SERPL ELPH-MCNC: 3.1 G/DL — LOW (ref 3.5–5.2)
ALBUMIN SERPL ELPH-MCNC: 3.2 G/DL — LOW (ref 3.5–5.2)
ALBUMIN SERPL ELPH-MCNC: 3.3 G/DL — LOW (ref 3.5–5.2)
ALBUMIN SERPL ELPH-MCNC: 3.3 G/DL — LOW (ref 3.5–5.2)
ALBUMIN SERPL ELPH-MCNC: 3.4 G/DL — LOW (ref 3.5–5.2)
ALBUMIN SERPL ELPH-MCNC: 3.5 G/DL — SIGNIFICANT CHANGE UP (ref 3.5–5.2)
ALBUMIN SERPL ELPH-MCNC: 4 G/DL — SIGNIFICANT CHANGE UP (ref 3.5–5.2)
ALP SERPL-CCNC: 65 U/L — SIGNIFICANT CHANGE UP (ref 30–115)
ALP SERPL-CCNC: 67 U/L — SIGNIFICANT CHANGE UP (ref 30–115)
ALP SERPL-CCNC: 70 U/L — SIGNIFICANT CHANGE UP (ref 30–115)
ALP SERPL-CCNC: 70 U/L — SIGNIFICANT CHANGE UP (ref 30–115)
ALP SERPL-CCNC: 71 U/L — SIGNIFICANT CHANGE UP (ref 30–115)
ALP SERPL-CCNC: 73 U/L — SIGNIFICANT CHANGE UP (ref 30–115)
ALP SERPL-CCNC: 73 U/L — SIGNIFICANT CHANGE UP (ref 30–115)
ALP SERPL-CCNC: 76 U/L — SIGNIFICANT CHANGE UP (ref 30–115)
ALP SERPL-CCNC: 76 U/L — SIGNIFICANT CHANGE UP (ref 30–115)
ALP SERPL-CCNC: 77 U/L — SIGNIFICANT CHANGE UP (ref 30–115)
ALP SERPL-CCNC: 77 U/L — SIGNIFICANT CHANGE UP (ref 30–115)
ALP SERPL-CCNC: 79 U/L — SIGNIFICANT CHANGE UP (ref 30–115)
ALP SERPL-CCNC: 80 U/L — SIGNIFICANT CHANGE UP (ref 30–115)
ALP SERPL-CCNC: 80 U/L — SIGNIFICANT CHANGE UP (ref 30–115)
ALP SERPL-CCNC: 82 U/L — SIGNIFICANT CHANGE UP (ref 30–115)
ALP SERPL-CCNC: 83 U/L — SIGNIFICANT CHANGE UP (ref 30–115)
ALP SERPL-CCNC: 84 U/L — SIGNIFICANT CHANGE UP (ref 30–115)
ALP SERPL-CCNC: 86 U/L — SIGNIFICANT CHANGE UP (ref 30–115)
ALP SERPL-CCNC: 88 U/L — SIGNIFICANT CHANGE UP (ref 30–115)
ALP SERPL-CCNC: 91 U/L — SIGNIFICANT CHANGE UP (ref 30–115)
ALP SERPL-CCNC: 91 U/L — SIGNIFICANT CHANGE UP (ref 30–115)
ALP SERPL-CCNC: 94 U/L — SIGNIFICANT CHANGE UP (ref 30–115)
ALT FLD-CCNC: 10 U/L — SIGNIFICANT CHANGE UP (ref 0–41)
ALT FLD-CCNC: 11 U/L — SIGNIFICANT CHANGE UP (ref 0–41)
ALT FLD-CCNC: 16 U/L — SIGNIFICANT CHANGE UP (ref 0–41)
ALT FLD-CCNC: 17 U/L — SIGNIFICANT CHANGE UP (ref 0–41)
ALT FLD-CCNC: 18 U/L — SIGNIFICANT CHANGE UP (ref 0–41)
ALT FLD-CCNC: 26 U/L — SIGNIFICANT CHANGE UP (ref 0–41)
ALT FLD-CCNC: 28 U/L — SIGNIFICANT CHANGE UP (ref 0–41)
ALT FLD-CCNC: 29 U/L — SIGNIFICANT CHANGE UP (ref 0–41)
ALT FLD-CCNC: 30 U/L — SIGNIFICANT CHANGE UP (ref 0–41)
ALT FLD-CCNC: 30 U/L — SIGNIFICANT CHANGE UP (ref 0–41)
ALT FLD-CCNC: 32 U/L — SIGNIFICANT CHANGE UP (ref 0–41)
ALT FLD-CCNC: 33 U/L — SIGNIFICANT CHANGE UP (ref 0–41)
ALT FLD-CCNC: 45 U/L — HIGH (ref 0–41)
ALT FLD-CCNC: 47 U/L — HIGH (ref 0–41)
ALT FLD-CCNC: 54 U/L — HIGH (ref 0–41)
ALT FLD-CCNC: 58 U/L — HIGH (ref 0–41)
ALT FLD-CCNC: 58 U/L — HIGH (ref 0–41)
ALT FLD-CCNC: 6 U/L — SIGNIFICANT CHANGE UP (ref 0–41)
ALT FLD-CCNC: 6 U/L — SIGNIFICANT CHANGE UP (ref 0–41)
ALT FLD-CCNC: 60 U/L — HIGH (ref 0–41)
ALT FLD-CCNC: 64 U/L — HIGH (ref 0–41)
ALT FLD-CCNC: 78 U/L — HIGH (ref 0–41)
ANION GAP SERPL CALC-SCNC: 10 MMOL/L — SIGNIFICANT CHANGE UP (ref 7–14)
ANION GAP SERPL CALC-SCNC: 11 MMOL/L — SIGNIFICANT CHANGE UP (ref 7–14)
ANION GAP SERPL CALC-SCNC: 12 MMOL/L — SIGNIFICANT CHANGE UP (ref 7–14)
ANION GAP SERPL CALC-SCNC: 13 MMOL/L — SIGNIFICANT CHANGE UP (ref 7–14)
ANION GAP SERPL CALC-SCNC: 13 MMOL/L — SIGNIFICANT CHANGE UP (ref 7–14)
ANION GAP SERPL CALC-SCNC: 14 MMOL/L — SIGNIFICANT CHANGE UP (ref 7–14)
ANION GAP SERPL CALC-SCNC: 16 MMOL/L — HIGH (ref 7–14)
ANION GAP SERPL CALC-SCNC: 17 MMOL/L — HIGH (ref 7–14)
ANION GAP SERPL CALC-SCNC: 19 MMOL/L — HIGH (ref 7–14)
ANION GAP SERPL CALC-SCNC: 9 MMOL/L — SIGNIFICANT CHANGE UP (ref 7–14)
APPEARANCE UR: CLEAR — SIGNIFICANT CHANGE UP
APPEARANCE UR: CLEAR — SIGNIFICANT CHANGE UP
APTT BLD: 128.4 SEC — CRITICAL HIGH (ref 27–39.2)
APTT BLD: 130.8 SEC — CRITICAL HIGH (ref 27–39.2)
APTT BLD: 28.1 SEC — SIGNIFICANT CHANGE UP (ref 27–39.2)
APTT BLD: 28.9 SEC — SIGNIFICANT CHANGE UP (ref 27–39.2)
APTT BLD: 31.4 SEC — SIGNIFICANT CHANGE UP (ref 27–39.2)
APTT BLD: 35.7 SEC — SIGNIFICANT CHANGE UP (ref 27–39.2)
APTT BLD: 37.8 SEC — SIGNIFICANT CHANGE UP (ref 27–39.2)
APTT BLD: 57 SEC — HIGH (ref 27–39.2)
APTT BLD: 60.2 SEC — HIGH (ref 27–39.2)
APTT BLD: 66.6 SEC — HIGH (ref 27–39.2)
APTT BLD: 67 SEC — HIGH (ref 27–39.2)
APTT BLD: 70.1 SEC — CRITICAL HIGH (ref 27–39.2)
APTT BLD: >200 SEC — CRITICAL HIGH (ref 27–39.2)
AST SERPL-CCNC: 15 U/L — SIGNIFICANT CHANGE UP (ref 0–41)
AST SERPL-CCNC: 20 U/L — SIGNIFICANT CHANGE UP (ref 0–41)
AST SERPL-CCNC: 22 U/L — SIGNIFICANT CHANGE UP (ref 0–41)
AST SERPL-CCNC: 23 U/L — SIGNIFICANT CHANGE UP (ref 0–41)
AST SERPL-CCNC: 24 U/L — SIGNIFICANT CHANGE UP (ref 0–41)
AST SERPL-CCNC: 26 U/L — SIGNIFICANT CHANGE UP (ref 0–41)
AST SERPL-CCNC: 27 U/L — SIGNIFICANT CHANGE UP (ref 0–41)
AST SERPL-CCNC: 28 U/L — SIGNIFICANT CHANGE UP (ref 0–41)
AST SERPL-CCNC: 29 U/L — SIGNIFICANT CHANGE UP (ref 0–41)
AST SERPL-CCNC: 33 U/L — SIGNIFICANT CHANGE UP (ref 0–41)
AST SERPL-CCNC: 35 U/L — SIGNIFICANT CHANGE UP (ref 0–41)
AST SERPL-CCNC: 36 U/L — SIGNIFICANT CHANGE UP (ref 0–41)
AST SERPL-CCNC: 36 U/L — SIGNIFICANT CHANGE UP (ref 0–41)
AST SERPL-CCNC: 37 U/L — SIGNIFICANT CHANGE UP (ref 0–41)
AST SERPL-CCNC: 38 U/L — SIGNIFICANT CHANGE UP (ref 0–41)
AST SERPL-CCNC: 39 U/L — SIGNIFICANT CHANGE UP (ref 0–41)
AST SERPL-CCNC: 43 U/L — HIGH (ref 0–41)
AST SERPL-CCNC: 47 U/L — HIGH (ref 0–41)
AST SERPL-CCNC: 54 U/L — HIGH (ref 0–41)
AST SERPL-CCNC: 60 U/L — HIGH (ref 0–41)
AT III ACT/NOR PPP CHRO: 105 % — SIGNIFICANT CHANGE UP (ref 85–135)
BACTERIA # UR AUTO: NEGATIVE — SIGNIFICANT CHANGE UP
BASOPHILS # BLD AUTO: 0 K/UL — SIGNIFICANT CHANGE UP (ref 0–0.2)
BASOPHILS # BLD AUTO: 0 K/UL — SIGNIFICANT CHANGE UP (ref 0–0.2)
BASOPHILS # BLD AUTO: 0.01 K/UL — SIGNIFICANT CHANGE UP (ref 0–0.2)
BASOPHILS # BLD AUTO: 0.02 K/UL — SIGNIFICANT CHANGE UP (ref 0–0.2)
BASOPHILS # BLD AUTO: 0.03 K/UL — SIGNIFICANT CHANGE UP (ref 0–0.2)
BASOPHILS # BLD AUTO: 0.04 K/UL — SIGNIFICANT CHANGE UP (ref 0–0.2)
BASOPHILS # BLD AUTO: 0.05 K/UL — SIGNIFICANT CHANGE UP (ref 0–0.2)
BASOPHILS # BLD AUTO: 0.06 K/UL — SIGNIFICANT CHANGE UP (ref 0–0.2)
BASOPHILS # BLD AUTO: 0.07 K/UL — SIGNIFICANT CHANGE UP (ref 0–0.2)
BASOPHILS # BLD AUTO: 0.08 K/UL — SIGNIFICANT CHANGE UP (ref 0–0.2)
BASOPHILS # BLD AUTO: 0.09 K/UL — SIGNIFICANT CHANGE UP (ref 0–0.2)
BASOPHILS NFR BLD AUTO: 0 % — SIGNIFICANT CHANGE UP (ref 0–1)
BASOPHILS NFR BLD AUTO: 0 % — SIGNIFICANT CHANGE UP (ref 0–1)
BASOPHILS NFR BLD AUTO: 0.1 % — SIGNIFICANT CHANGE UP (ref 0–1)
BASOPHILS NFR BLD AUTO: 0.1 % — SIGNIFICANT CHANGE UP (ref 0–1)
BASOPHILS NFR BLD AUTO: 0.2 % — SIGNIFICANT CHANGE UP (ref 0–1)
BASOPHILS NFR BLD AUTO: 0.3 % — SIGNIFICANT CHANGE UP (ref 0–1)
BASOPHILS NFR BLD AUTO: 0.4 % — SIGNIFICANT CHANGE UP (ref 0–1)
BASOPHILS NFR BLD AUTO: 0.5 % — SIGNIFICANT CHANGE UP (ref 0–1)
BILIRUB DIRECT SERPL-MCNC: 0.4 MG/DL — HIGH (ref 0–0.2)
BILIRUB INDIRECT FLD-MCNC: 0.6 MG/DL — SIGNIFICANT CHANGE UP (ref 0.2–1.2)
BILIRUB SERPL-MCNC: 0.5 MG/DL — SIGNIFICANT CHANGE UP (ref 0.2–1.2)
BILIRUB SERPL-MCNC: 0.6 MG/DL — SIGNIFICANT CHANGE UP (ref 0.2–1.2)
BILIRUB SERPL-MCNC: 0.7 MG/DL — SIGNIFICANT CHANGE UP (ref 0.2–1.2)
BILIRUB SERPL-MCNC: 0.8 MG/DL — SIGNIFICANT CHANGE UP (ref 0.2–1.2)
BILIRUB SERPL-MCNC: 0.9 MG/DL — SIGNIFICANT CHANGE UP (ref 0.2–1.2)
BILIRUB SERPL-MCNC: 1 MG/DL — SIGNIFICANT CHANGE UP (ref 0.2–1.2)
BILIRUB SERPL-MCNC: 1.2 MG/DL — SIGNIFICANT CHANGE UP (ref 0.2–1.2)
BILIRUB SERPL-MCNC: 1.4 MG/DL — HIGH (ref 0.2–1.2)
BILIRUB SERPL-MCNC: 1.6 MG/DL — HIGH (ref 0.2–1.2)
BILIRUB SERPL-MCNC: 1.6 MG/DL — HIGH (ref 0.2–1.2)
BILIRUB SERPL-MCNC: 2 MG/DL — HIGH (ref 0.2–1.2)
BILIRUB SERPL-MCNC: 2.5 MG/DL — HIGH (ref 0.2–1.2)
BILIRUB UR-MCNC: NEGATIVE — SIGNIFICANT CHANGE UP
BILIRUB UR-MCNC: NEGATIVE — SIGNIFICANT CHANGE UP
BUN SERPL-MCNC: 103 MG/DL — CRITICAL HIGH (ref 10–20)
BUN SERPL-MCNC: 106 MG/DL — CRITICAL HIGH (ref 10–20)
BUN SERPL-MCNC: 129 MG/DL — CRITICAL HIGH (ref 10–20)
BUN SERPL-MCNC: 13 MG/DL — SIGNIFICANT CHANGE UP (ref 10–20)
BUN SERPL-MCNC: 141 MG/DL — CRITICAL HIGH (ref 10–20)
BUN SERPL-MCNC: 160 MG/DL — CRITICAL HIGH (ref 10–20)
BUN SERPL-MCNC: 166 MG/DL — CRITICAL HIGH (ref 10–20)
BUN SERPL-MCNC: 21 MG/DL — HIGH (ref 10–20)
BUN SERPL-MCNC: 30 MG/DL — HIGH (ref 10–20)
BUN SERPL-MCNC: 34 MG/DL — HIGH (ref 10–20)
BUN SERPL-MCNC: 38 MG/DL — HIGH (ref 10–20)
BUN SERPL-MCNC: 40 MG/DL — HIGH (ref 10–20)
BUN SERPL-MCNC: 43 MG/DL — HIGH (ref 10–20)
BUN SERPL-MCNC: 43 MG/DL — HIGH (ref 10–20)
BUN SERPL-MCNC: 45 MG/DL — HIGH (ref 10–20)
BUN SERPL-MCNC: 51 MG/DL — HIGH (ref 10–20)
BUN SERPL-MCNC: 58 MG/DL — HIGH (ref 10–20)
BUN SERPL-MCNC: 72 MG/DL — CRITICAL HIGH (ref 10–20)
BUN SERPL-MCNC: 80 MG/DL — CRITICAL HIGH (ref 10–20)
CALCIUM SERPL-MCNC: 6.7 MG/DL — LOW (ref 8.5–10.1)
CALCIUM SERPL-MCNC: 7.3 MG/DL — LOW (ref 8.5–10.1)
CALCIUM SERPL-MCNC: 7.5 MG/DL — LOW (ref 8.5–10.1)
CALCIUM SERPL-MCNC: 7.5 MG/DL — LOW (ref 8.5–10.1)
CALCIUM SERPL-MCNC: 7.9 MG/DL — LOW (ref 8.5–10.1)
CALCIUM SERPL-MCNC: 8 MG/DL — LOW (ref 8.5–10.1)
CALCIUM SERPL-MCNC: 8.1 MG/DL — LOW (ref 8.5–10.1)
CALCIUM SERPL-MCNC: 8.2 MG/DL — LOW (ref 8.5–10.1)
CALCIUM SERPL-MCNC: 8.3 MG/DL — LOW (ref 8.5–10.1)
CALCIUM SERPL-MCNC: 8.6 MG/DL — SIGNIFICANT CHANGE UP (ref 8.5–10.1)
CALCIUM SERPL-MCNC: 8.7 MG/DL — SIGNIFICANT CHANGE UP (ref 8.5–10.1)
CALCIUM SERPL-MCNC: 8.8 MG/DL — SIGNIFICANT CHANGE UP (ref 8.5–10.1)
CALCIUM SERPL-MCNC: 8.9 MG/DL — SIGNIFICANT CHANGE UP (ref 8.5–10.1)
CALCIUM SERPL-MCNC: 9.1 MG/DL — SIGNIFICANT CHANGE UP (ref 8.5–10.1)
CALCIUM SERPL-MCNC: 9.3 MG/DL — SIGNIFICANT CHANGE UP (ref 8.5–10.1)
CALCIUM SERPL-MCNC: 9.3 MG/DL — SIGNIFICANT CHANGE UP (ref 8.5–10.1)
CALCIUM SERPL-MCNC: 9.4 MG/DL — SIGNIFICANT CHANGE UP (ref 8.5–10.1)
CALCIUM SERPL-MCNC: 9.9 MG/DL — SIGNIFICANT CHANGE UP (ref 8.5–10.1)
CHLORIDE SERPL-SCNC: 101 MMOL/L — SIGNIFICANT CHANGE UP (ref 98–110)
CHLORIDE SERPL-SCNC: 102 MMOL/L — SIGNIFICANT CHANGE UP (ref 98–110)
CHLORIDE SERPL-SCNC: 103 MMOL/L — SIGNIFICANT CHANGE UP (ref 98–110)
CHLORIDE SERPL-SCNC: 103 MMOL/L — SIGNIFICANT CHANGE UP (ref 98–110)
CHLORIDE SERPL-SCNC: 104 MMOL/L — SIGNIFICANT CHANGE UP (ref 98–110)
CHLORIDE SERPL-SCNC: 105 MMOL/L — SIGNIFICANT CHANGE UP (ref 98–110)
CHLORIDE SERPL-SCNC: 105 MMOL/L — SIGNIFICANT CHANGE UP (ref 98–110)
CHLORIDE SERPL-SCNC: 106 MMOL/L — SIGNIFICANT CHANGE UP (ref 98–110)
CHLORIDE SERPL-SCNC: 106 MMOL/L — SIGNIFICANT CHANGE UP (ref 98–110)
CHLORIDE SERPL-SCNC: 107 MMOL/L — SIGNIFICANT CHANGE UP (ref 98–110)
CHLORIDE SERPL-SCNC: 108 MMOL/L — SIGNIFICANT CHANGE UP (ref 98–110)
CHLORIDE SERPL-SCNC: 108 MMOL/L — SIGNIFICANT CHANGE UP (ref 98–110)
CHLORIDE SERPL-SCNC: 90 MMOL/L — LOW (ref 98–110)
CHLORIDE SERPL-SCNC: 92 MMOL/L — LOW (ref 98–110)
CHLORIDE SERPL-SCNC: 92 MMOL/L — LOW (ref 98–110)
CHLORIDE SERPL-SCNC: 94 MMOL/L — LOW (ref 98–110)
CHLORIDE SERPL-SCNC: 95 MMOL/L — LOW (ref 98–110)
CHLORIDE SERPL-SCNC: 98 MMOL/L — SIGNIFICANT CHANGE UP (ref 98–110)
CHLORIDE SERPL-SCNC: 99 MMOL/L — SIGNIFICANT CHANGE UP (ref 98–110)
CHLORIDE SERPL-SCNC: 99 MMOL/L — SIGNIFICANT CHANGE UP (ref 98–110)
CHLORIDE UR-SCNC: 29 — SIGNIFICANT CHANGE UP
CK SERPL-CCNC: 45 U/L — SIGNIFICANT CHANGE UP (ref 0–225)
CK SERPL-CCNC: 83 U/L — SIGNIFICANT CHANGE UP (ref 0–225)
CO2 SERPL-SCNC: 19 MMOL/L — SIGNIFICANT CHANGE UP (ref 17–32)
CO2 SERPL-SCNC: 19 MMOL/L — SIGNIFICANT CHANGE UP (ref 17–32)
CO2 SERPL-SCNC: 20 MMOL/L — SIGNIFICANT CHANGE UP (ref 17–32)
CO2 SERPL-SCNC: 20 MMOL/L — SIGNIFICANT CHANGE UP (ref 17–32)
CO2 SERPL-SCNC: 21 MMOL/L — SIGNIFICANT CHANGE UP (ref 17–32)
CO2 SERPL-SCNC: 22 MMOL/L — SIGNIFICANT CHANGE UP (ref 17–32)
CO2 SERPL-SCNC: 23 MMOL/L — SIGNIFICANT CHANGE UP (ref 17–32)
CO2 SERPL-SCNC: 24 MMOL/L — SIGNIFICANT CHANGE UP (ref 17–32)
CO2 SERPL-SCNC: 24 MMOL/L — SIGNIFICANT CHANGE UP (ref 17–32)
CO2 SERPL-SCNC: 25 MMOL/L — SIGNIFICANT CHANGE UP (ref 17–32)
CO2 SERPL-SCNC: 26 MMOL/L — SIGNIFICANT CHANGE UP (ref 17–32)
COLOR SPEC: YELLOW — SIGNIFICANT CHANGE UP
COLOR SPEC: YELLOW — SIGNIFICANT CHANGE UP
CREAT SERPL-MCNC: 1.1 MG/DL — SIGNIFICANT CHANGE UP (ref 0.7–1.5)
CREAT SERPL-MCNC: 1.2 MG/DL — SIGNIFICANT CHANGE UP (ref 0.7–1.5)
CREAT SERPL-MCNC: 1.3 MG/DL — SIGNIFICANT CHANGE UP (ref 0.7–1.5)
CREAT SERPL-MCNC: 1.5 MG/DL — SIGNIFICANT CHANGE UP (ref 0.7–1.5)
CREAT SERPL-MCNC: 1.8 MG/DL — HIGH (ref 0.7–1.5)
CREAT SERPL-MCNC: 2.3 MG/DL — HIGH (ref 0.7–1.5)
CREAT SERPL-MCNC: 3.6 MG/DL — HIGH (ref 0.7–1.5)
CREAT SERPL-MCNC: 4.7 MG/DL — CRITICAL HIGH (ref 0.7–1.5)
CREAT SERPL-MCNC: 5 MG/DL — CRITICAL HIGH (ref 0.7–1.5)
CRP SERPL-MCNC: 0.18 MG/DL — SIGNIFICANT CHANGE UP (ref 0–0.4)
CRP SERPL-MCNC: 0.39 MG/DL — SIGNIFICANT CHANGE UP (ref 0–0.4)
CRP SERPL-MCNC: 0.94 MG/DL — HIGH (ref 0–0.4)
CRP SERPL-MCNC: 10.5 MG/DL — HIGH (ref 0–0.4)
CRP SERPL-MCNC: 12.29 MG/DL — HIGH (ref 0–0.4)
CRP SERPL-MCNC: 15.97 MG/DL — HIGH (ref 0–0.4)
CRP SERPL-MCNC: 16.49 MG/DL — HIGH (ref 0–0.4)
CRP SERPL-MCNC: 3.14 MG/DL — HIGH (ref 0–0.4)
CRP SERPL-MCNC: 5.8 MG/DL — HIGH (ref 0–0.4)
CRP SERPL-MCNC: 7.37 MG/DL — HIGH (ref 0–0.4)
CRP SERPL-MCNC: 9.74 MG/DL — HIGH (ref 0–0.4)
CULTURE RESULTS: NO GROWTH — SIGNIFICANT CHANGE UP
CULTURE RESULTS: SIGNIFICANT CHANGE UP
D DIMER BLD IA.RAPID-MCNC: 2886 NG/ML DDU — HIGH (ref 0–230)
D DIMER BLD IA.RAPID-MCNC: 3262 NG/ML DDU — HIGH (ref 0–230)
D DIMER BLD IA.RAPID-MCNC: 417 NG/ML DDU — HIGH (ref 0–230)
D DIMER BLD IA.RAPID-MCNC: 473 NG/ML DDU — HIGH (ref 0–230)
D DIMER BLD IA.RAPID-MCNC: 481 NG/ML DDU — HIGH (ref 0–230)
D DIMER BLD IA.RAPID-MCNC: 544 NG/ML DDU — HIGH (ref 0–230)
D DIMER BLD IA.RAPID-MCNC: 569 NG/ML DDU — HIGH (ref 0–230)
D DIMER BLD IA.RAPID-MCNC: 589 NG/ML DDU — HIGH (ref 0–230)
D DIMER BLD IA.RAPID-MCNC: 698 NG/ML DDU — HIGH (ref 0–230)
D DIMER BLD IA.RAPID-MCNC: 779 NG/ML DDU — HIGH (ref 0–230)
D DIMER BLD IA.RAPID-MCNC: 979 NG/ML DDU — HIGH (ref 0–230)
DIFF PNL FLD: ABNORMAL
DIFF PNL FLD: NEGATIVE — SIGNIFICANT CHANGE UP
EOSINOPHIL # BLD AUTO: 0 K/UL — SIGNIFICANT CHANGE UP (ref 0–0.7)
EOSINOPHIL # BLD AUTO: 0.01 K/UL — SIGNIFICANT CHANGE UP (ref 0–0.7)
EOSINOPHIL # BLD AUTO: 0.02 K/UL — SIGNIFICANT CHANGE UP (ref 0–0.7)
EOSINOPHIL # BLD AUTO: 0.02 K/UL — SIGNIFICANT CHANGE UP (ref 0–0.7)
EOSINOPHIL # BLD AUTO: 0.03 K/UL — SIGNIFICANT CHANGE UP (ref 0–0.7)
EOSINOPHIL # BLD AUTO: 0.04 K/UL — SIGNIFICANT CHANGE UP (ref 0–0.7)
EOSINOPHIL # BLD AUTO: 0.17 K/UL — SIGNIFICANT CHANGE UP (ref 0–0.7)
EOSINOPHIL NFR BLD AUTO: 0 % — SIGNIFICANT CHANGE UP (ref 0–8)
EOSINOPHIL NFR BLD AUTO: 0.1 % — SIGNIFICANT CHANGE UP (ref 0–8)
EOSINOPHIL NFR BLD AUTO: 0.1 % — SIGNIFICANT CHANGE UP (ref 0–8)
EOSINOPHIL NFR BLD AUTO: 0.2 % — SIGNIFICANT CHANGE UP (ref 0–8)
EOSINOPHIL NFR BLD AUTO: 0.3 % — SIGNIFICANT CHANGE UP (ref 0–8)
EOSINOPHIL NFR BLD AUTO: 1.4 % — SIGNIFICANT CHANGE UP (ref 0–8)
EPI CELLS # UR: 9 /HPF — HIGH (ref 0–5)
FERRITIN SERPL-MCNC: 1176 NG/ML — HIGH (ref 30–400)
FERRITIN SERPL-MCNC: 1183 NG/ML — HIGH (ref 30–400)
FERRITIN SERPL-MCNC: 1327 NG/ML — HIGH (ref 30–400)
FERRITIN SERPL-MCNC: 1516 NG/ML — HIGH (ref 30–400)
FERRITIN SERPL-MCNC: 1847 NG/ML — HIGH (ref 30–400)
FERRITIN SERPL-MCNC: 2998 NG/ML — HIGH (ref 30–400)
FERRITIN SERPL-MCNC: 3080 NG/ML — HIGH (ref 30–400)
FIBRINOGEN PPP-MCNC: 373 MG/DL — SIGNIFICANT CHANGE UP (ref 204.4–570.6)
FIBRINOGEN PPP-MCNC: >700 MG/DL — HIGH (ref 204.4–570.6)
FUNGITELL: <31 PG/ML — SIGNIFICANT CHANGE UP
GLUCOSE BLDC GLUCOMTR-MCNC: 111 MG/DL — HIGH (ref 70–99)
GLUCOSE BLDC GLUCOMTR-MCNC: 113 MG/DL — HIGH (ref 70–99)
GLUCOSE BLDC GLUCOMTR-MCNC: 114 MG/DL — HIGH (ref 70–99)
GLUCOSE BLDC GLUCOMTR-MCNC: 116 MG/DL — HIGH (ref 70–99)
GLUCOSE BLDC GLUCOMTR-MCNC: 121 MG/DL — HIGH (ref 70–99)
GLUCOSE BLDC GLUCOMTR-MCNC: 123 MG/DL — HIGH (ref 70–99)
GLUCOSE BLDC GLUCOMTR-MCNC: 123 MG/DL — HIGH (ref 70–99)
GLUCOSE BLDC GLUCOMTR-MCNC: 125 MG/DL — HIGH (ref 70–99)
GLUCOSE BLDC GLUCOMTR-MCNC: 126 MG/DL — HIGH (ref 70–99)
GLUCOSE BLDC GLUCOMTR-MCNC: 127 MG/DL — HIGH (ref 70–99)
GLUCOSE BLDC GLUCOMTR-MCNC: 137 MG/DL — HIGH (ref 70–99)
GLUCOSE BLDC GLUCOMTR-MCNC: 142 MG/DL — HIGH (ref 70–99)
GLUCOSE BLDC GLUCOMTR-MCNC: 143 MG/DL — HIGH (ref 70–99)
GLUCOSE BLDC GLUCOMTR-MCNC: 143 MG/DL — HIGH (ref 70–99)
GLUCOSE BLDC GLUCOMTR-MCNC: 146 MG/DL — HIGH (ref 70–99)
GLUCOSE BLDC GLUCOMTR-MCNC: 149 MG/DL — HIGH (ref 70–99)
GLUCOSE BLDC GLUCOMTR-MCNC: 152 MG/DL — HIGH (ref 70–99)
GLUCOSE BLDC GLUCOMTR-MCNC: 178 MG/DL — HIGH (ref 70–99)
GLUCOSE BLDC GLUCOMTR-MCNC: 198 MG/DL — HIGH (ref 70–99)
GLUCOSE SERPL-MCNC: 100 MG/DL — HIGH (ref 70–99)
GLUCOSE SERPL-MCNC: 105 MG/DL — HIGH (ref 70–99)
GLUCOSE SERPL-MCNC: 107 MG/DL — HIGH (ref 70–99)
GLUCOSE SERPL-MCNC: 112 MG/DL — HIGH (ref 70–99)
GLUCOSE SERPL-MCNC: 114 MG/DL — HIGH (ref 70–99)
GLUCOSE SERPL-MCNC: 114 MG/DL — HIGH (ref 70–99)
GLUCOSE SERPL-MCNC: 116 MG/DL — HIGH (ref 70–99)
GLUCOSE SERPL-MCNC: 118 MG/DL — HIGH (ref 70–99)
GLUCOSE SERPL-MCNC: 125 MG/DL — HIGH (ref 70–99)
GLUCOSE SERPL-MCNC: 126 MG/DL — HIGH (ref 70–99)
GLUCOSE SERPL-MCNC: 134 MG/DL — HIGH (ref 70–99)
GLUCOSE SERPL-MCNC: 136 MG/DL — HIGH (ref 70–99)
GLUCOSE SERPL-MCNC: 137 MG/DL — HIGH (ref 70–99)
GLUCOSE SERPL-MCNC: 138 MG/DL — HIGH (ref 70–99)
GLUCOSE SERPL-MCNC: 139 MG/DL — HIGH (ref 70–99)
GLUCOSE SERPL-MCNC: 149 MG/DL — HIGH (ref 70–99)
GLUCOSE SERPL-MCNC: 162 MG/DL — HIGH (ref 70–99)
GLUCOSE SERPL-MCNC: 166 MG/DL — HIGH (ref 70–99)
GLUCOSE SERPL-MCNC: 176 MG/DL — HIGH (ref 70–99)
GLUCOSE SERPL-MCNC: 97 MG/DL — SIGNIFICANT CHANGE UP (ref 70–99)
GLUCOSE SERPL-MCNC: 99 MG/DL — SIGNIFICANT CHANGE UP (ref 70–99)
GLUCOSE SERPL-MCNC: 99 MG/DL — SIGNIFICANT CHANGE UP (ref 70–99)
GLUCOSE UR QL: NEGATIVE MG/DL — SIGNIFICANT CHANGE UP
GLUCOSE UR QL: SIGNIFICANT CHANGE UP
GRAM STN FLD: SIGNIFICANT CHANGE UP
HCT VFR BLD CALC: 35.1 % — LOW (ref 42–52)
HCT VFR BLD CALC: 35.5 % — LOW (ref 42–52)
HCT VFR BLD CALC: 36.1 % — LOW (ref 42–52)
HCT VFR BLD CALC: 41.2 % — LOW (ref 42–52)
HCT VFR BLD CALC: 46.1 % — SIGNIFICANT CHANGE UP (ref 42–52)
HCT VFR BLD CALC: 47.3 % — SIGNIFICANT CHANGE UP (ref 42–52)
HCT VFR BLD CALC: 47.5 % — SIGNIFICANT CHANGE UP (ref 42–52)
HCT VFR BLD CALC: 48.1 % — SIGNIFICANT CHANGE UP (ref 42–52)
HCT VFR BLD CALC: 48.4 % — SIGNIFICANT CHANGE UP (ref 42–52)
HCT VFR BLD CALC: 48.8 % — SIGNIFICANT CHANGE UP (ref 42–52)
HCT VFR BLD CALC: 49.5 % — SIGNIFICANT CHANGE UP (ref 42–52)
HCT VFR BLD CALC: 49.6 % — SIGNIFICANT CHANGE UP (ref 42–52)
HCT VFR BLD CALC: 49.9 % — SIGNIFICANT CHANGE UP (ref 42–52)
HCT VFR BLD CALC: 50.2 % — SIGNIFICANT CHANGE UP (ref 42–52)
HCT VFR BLD CALC: 50.2 % — SIGNIFICANT CHANGE UP (ref 42–52)
HCT VFR BLD CALC: 50.3 % — SIGNIFICANT CHANGE UP (ref 42–52)
HCT VFR BLD CALC: 50.5 % — SIGNIFICANT CHANGE UP (ref 42–52)
HCT VFR BLD CALC: 50.7 % — SIGNIFICANT CHANGE UP (ref 42–52)
HCT VFR BLD CALC: 51.9 % — SIGNIFICANT CHANGE UP (ref 42–52)
HCT VFR BLD CALC: 52.1 % — HIGH (ref 42–52)
HGB BLD-MCNC: 11.6 G/DL — LOW (ref 14–18)
HGB BLD-MCNC: 11.8 G/DL — LOW (ref 14–18)
HGB BLD-MCNC: 12 G/DL — LOW (ref 14–18)
HGB BLD-MCNC: 13.5 G/DL — LOW (ref 14–18)
HGB BLD-MCNC: 15 G/DL — SIGNIFICANT CHANGE UP (ref 14–18)
HGB BLD-MCNC: 15.6 G/DL — SIGNIFICANT CHANGE UP (ref 14–18)
HGB BLD-MCNC: 15.7 G/DL — SIGNIFICANT CHANGE UP (ref 14–18)
HGB BLD-MCNC: 15.9 G/DL — SIGNIFICANT CHANGE UP (ref 14–18)
HGB BLD-MCNC: 16.1 G/DL — SIGNIFICANT CHANGE UP (ref 14–18)
HGB BLD-MCNC: 16.1 G/DL — SIGNIFICANT CHANGE UP (ref 14–18)
HGB BLD-MCNC: 16.2 G/DL — SIGNIFICANT CHANGE UP (ref 14–18)
HGB BLD-MCNC: 16.4 G/DL — SIGNIFICANT CHANGE UP (ref 14–18)
HGB BLD-MCNC: 16.5 G/DL — SIGNIFICANT CHANGE UP (ref 14–18)
HGB BLD-MCNC: 16.6 G/DL — SIGNIFICANT CHANGE UP (ref 14–18)
HGB BLD-MCNC: 16.8 G/DL — SIGNIFICANT CHANGE UP (ref 14–18)
HGB BLD-MCNC: 16.9 G/DL — SIGNIFICANT CHANGE UP (ref 14–18)
HYALINE CASTS # UR AUTO: 10 /LPF — HIGH (ref 0–7)
IL10 SERPL-MCNC: 17.2 PG/ML — HIGH
IL12 SERPL-MCNC: 2.1 PG/ML — HIGH
IL13 SERPL-MCNC: 21.7 PG/ML — HIGH
IL17A SERPL-MCNC: 3.7 PG/ML — HIGH
IL2 SERPL-MCNC: 2382.9 PG/ML — HIGH (ref 175.3–858.2)
IL2 SERPL-MCNC: <2.1 PG/ML — SIGNIFICANT CHANGE UP
IL4 SERPL-MCNC: <2.2 PG/ML — SIGNIFICANT CHANGE UP
IL6 SERPL-MCNC: 41 PG/ML — HIGH
IL8 SERPL-MCNC: 17.2 PG/ML — HIGH
IMM GRANULOCYTES NFR BLD AUTO: 0.2 % — SIGNIFICANT CHANGE UP (ref 0.1–0.3)
IMM GRANULOCYTES NFR BLD AUTO: 0.4 % — HIGH (ref 0.1–0.3)
IMM GRANULOCYTES NFR BLD AUTO: 0.5 % — HIGH (ref 0.1–0.3)
IMM GRANULOCYTES NFR BLD AUTO: 0.5 % — HIGH (ref 0.1–0.3)
IMM GRANULOCYTES NFR BLD AUTO: 0.7 % — HIGH (ref 0.1–0.3)
IMM GRANULOCYTES NFR BLD AUTO: 0.8 % — HIGH (ref 0.1–0.3)
IMM GRANULOCYTES NFR BLD AUTO: 0.9 % — HIGH (ref 0.1–0.3)
IMM GRANULOCYTES NFR BLD AUTO: 0.9 % — HIGH (ref 0.1–0.3)
IMM GRANULOCYTES NFR BLD AUTO: 1 % — HIGH (ref 0.1–0.3)
IMM GRANULOCYTES NFR BLD AUTO: 1.2 % — HIGH (ref 0.1–0.3)
IMM GRANULOCYTES NFR BLD AUTO: 1.3 % — HIGH (ref 0.1–0.3)
IMM GRANULOCYTES NFR BLD AUTO: 1.3 % — HIGH (ref 0.1–0.3)
IMM GRANULOCYTES NFR BLD AUTO: 1.6 % — HIGH (ref 0.1–0.3)
IMM GRANULOCYTES NFR BLD AUTO: 1.8 % — HIGH (ref 0.1–0.3)
IMM GRANULOCYTES NFR BLD AUTO: 1.9 % — HIGH (ref 0.1–0.3)
IMM GRANULOCYTES NFR BLD AUTO: 2.1 % — HIGH (ref 0.1–0.3)
IMM GRANULOCYTES NFR BLD AUTO: 2.2 % — HIGH (ref 0.1–0.3)
IMM GRANULOCYTES NFR BLD AUTO: 2.7 % — HIGH (ref 0.1–0.3)
INR BLD: 1.01 RATIO — SIGNIFICANT CHANGE UP (ref 0.65–1.3)
INR BLD: 1.02 RATIO — SIGNIFICANT CHANGE UP (ref 0.65–1.3)
INR BLD: 1.12 RATIO — SIGNIFICANT CHANGE UP (ref 0.65–1.3)
INR BLD: 1.12 RATIO — SIGNIFICANT CHANGE UP (ref 0.65–1.3)
INR BLD: 1.13 RATIO — SIGNIFICANT CHANGE UP (ref 0.65–1.3)
INR BLD: 1.27 RATIO — SIGNIFICANT CHANGE UP (ref 0.65–1.3)
INTERFERON GAMMA, BLOOD: 7.6 PG/ML — HIGH
INTERLEUKIN 1 BETA: <6.5 PG/ML — SIGNIFICANT CHANGE UP
INTERLEUKIN 5: 2.8 PG/ML — HIGH
KETONES UR-MCNC: 15
KETONES UR-MCNC: NEGATIVE — SIGNIFICANT CHANGE UP
LACTATE SERPL-SCNC: 1.6 MMOL/L — SIGNIFICANT CHANGE UP (ref 0.7–2)
LDH SERPL L TO P-CCNC: 469 U/L — HIGH (ref 50–242)
LDH SERPL L TO P-CCNC: 587 U/L — HIGH (ref 50–242)
LEUKOCYTE ESTERASE UR-ACNC: NEGATIVE — SIGNIFICANT CHANGE UP
LEUKOCYTE ESTERASE UR-ACNC: NEGATIVE — SIGNIFICANT CHANGE UP
LIDOCAIN IGE QN: 27 U/L — SIGNIFICANT CHANGE UP (ref 7–60)
LYMPHOCYTES # BLD AUTO: 0 % — LOW (ref 20.5–51.1)
LYMPHOCYTES # BLD AUTO: 0 K/UL — LOW (ref 1.2–3.4)
LYMPHOCYTES # BLD AUTO: 0.4 K/UL — LOW (ref 1.2–3.4)
LYMPHOCYTES # BLD AUTO: 0.52 K/UL — LOW (ref 1.2–3.4)
LYMPHOCYTES # BLD AUTO: 0.55 K/UL — LOW (ref 1.2–3.4)
LYMPHOCYTES # BLD AUTO: 0.65 K/UL — LOW (ref 1.2–3.4)
LYMPHOCYTES # BLD AUTO: 0.75 K/UL — LOW (ref 1.2–3.4)
LYMPHOCYTES # BLD AUTO: 0.82 K/UL — LOW (ref 1.2–3.4)
LYMPHOCYTES # BLD AUTO: 0.86 K/UL — LOW (ref 1.2–3.4)
LYMPHOCYTES # BLD AUTO: 0.91 K/UL — LOW (ref 1.2–3.4)
LYMPHOCYTES # BLD AUTO: 1.03 K/UL — LOW (ref 1.2–3.4)
LYMPHOCYTES # BLD AUTO: 1.04 K/UL — LOW (ref 1.2–3.4)
LYMPHOCYTES # BLD AUTO: 1.06 K/UL — LOW (ref 1.2–3.4)
LYMPHOCYTES # BLD AUTO: 1.06 K/UL — LOW (ref 1.2–3.4)
LYMPHOCYTES # BLD AUTO: 1.09 K/UL — LOW (ref 1.2–3.4)
LYMPHOCYTES # BLD AUTO: 1.13 K/UL — LOW (ref 1.2–3.4)
LYMPHOCYTES # BLD AUTO: 1.18 K/UL — LOW (ref 1.2–3.4)
LYMPHOCYTES # BLD AUTO: 1.3 % — LOW (ref 20.5–51.1)
LYMPHOCYTES # BLD AUTO: 1.33 K/UL — SIGNIFICANT CHANGE UP (ref 1.2–3.4)
LYMPHOCYTES # BLD AUTO: 1.38 K/UL — SIGNIFICANT CHANGE UP (ref 1.2–3.4)
LYMPHOCYTES # BLD AUTO: 1.8 % — LOW (ref 20.5–51.1)
LYMPHOCYTES # BLD AUTO: 1.96 K/UL — SIGNIFICANT CHANGE UP (ref 1.2–3.4)
LYMPHOCYTES # BLD AUTO: 16.2 % — LOW (ref 20.5–51.1)
LYMPHOCYTES # BLD AUTO: 21.3 % — SIGNIFICANT CHANGE UP (ref 20.5–51.1)
LYMPHOCYTES # BLD AUTO: 3 % — LOW (ref 20.5–51.1)
LYMPHOCYTES # BLD AUTO: 3.1 % — LOW (ref 20.5–51.1)
LYMPHOCYTES # BLD AUTO: 3.1 % — LOW (ref 20.5–51.1)
LYMPHOCYTES # BLD AUTO: 3.4 % — LOW (ref 20.5–51.1)
LYMPHOCYTES # BLD AUTO: 4.1 % — LOW (ref 20.5–51.1)
LYMPHOCYTES # BLD AUTO: 4.9 % — LOW (ref 20.5–51.1)
LYMPHOCYTES # BLD AUTO: 5.2 % — LOW (ref 20.5–51.1)
LYMPHOCYTES # BLD AUTO: 5.2 % — LOW (ref 20.5–51.1)
LYMPHOCYTES # BLD AUTO: 5.6 % — LOW (ref 20.5–51.1)
LYMPHOCYTES # BLD AUTO: 5.8 % — LOW (ref 20.5–51.1)
LYMPHOCYTES # BLD AUTO: 6.2 % — LOW (ref 20.5–51.1)
LYMPHOCYTES # BLD AUTO: 8.5 % — LOW (ref 20.5–51.1)
LYMPHOCYTES # BLD AUTO: 9.4 % — LOW (ref 20.5–51.1)
LYMPHOCYTES # BLD AUTO: 9.9 % — LOW (ref 20.5–51.1)
MAGNESIUM SERPL-MCNC: 2 MG/DL — SIGNIFICANT CHANGE UP (ref 1.8–2.4)
MAGNESIUM SERPL-MCNC: 2.1 MG/DL — SIGNIFICANT CHANGE UP (ref 1.8–2.4)
MAGNESIUM SERPL-MCNC: 2.2 MG/DL — SIGNIFICANT CHANGE UP (ref 1.8–2.4)
MAGNESIUM SERPL-MCNC: 2.2 MG/DL — SIGNIFICANT CHANGE UP (ref 1.8–2.4)
MAGNESIUM SERPL-MCNC: 2.3 MG/DL — SIGNIFICANT CHANGE UP (ref 1.8–2.4)
MAGNESIUM SERPL-MCNC: 2.3 MG/DL — SIGNIFICANT CHANGE UP (ref 1.8–2.4)
MAGNESIUM SERPL-MCNC: 2.4 MG/DL — SIGNIFICANT CHANGE UP (ref 1.8–2.4)
MAGNESIUM SERPL-MCNC: 2.5 MG/DL — HIGH (ref 1.8–2.4)
MAGNESIUM SERPL-MCNC: 2.6 MG/DL — HIGH (ref 1.8–2.4)
MAGNESIUM SERPL-MCNC: 2.6 MG/DL — HIGH (ref 1.8–2.4)
MAGNESIUM SERPL-MCNC: 2.7 MG/DL — HIGH (ref 1.8–2.4)
MAGNESIUM SERPL-MCNC: 3.1 MG/DL — CRITICAL HIGH (ref 1.8–2.4)
MAGNESIUM SERPL-MCNC: 3.3 MG/DL — CRITICAL HIGH (ref 1.8–2.4)
MANUAL SMEAR VERIFICATION: SIGNIFICANT CHANGE UP
MANUAL SMEAR VERIFICATION: SIGNIFICANT CHANGE UP
MCHC RBC-ENTMCNC: 29.2 PG — SIGNIFICANT CHANGE UP (ref 27–31)
MCHC RBC-ENTMCNC: 29.2 PG — SIGNIFICANT CHANGE UP (ref 27–31)
MCHC RBC-ENTMCNC: 29.3 PG — SIGNIFICANT CHANGE UP (ref 27–31)
MCHC RBC-ENTMCNC: 29.4 PG — SIGNIFICANT CHANGE UP (ref 27–31)
MCHC RBC-ENTMCNC: 29.5 PG — SIGNIFICANT CHANGE UP (ref 27–31)
MCHC RBC-ENTMCNC: 29.6 PG — SIGNIFICANT CHANGE UP (ref 27–31)
MCHC RBC-ENTMCNC: 29.7 PG — SIGNIFICANT CHANGE UP (ref 27–31)
MCHC RBC-ENTMCNC: 29.8 PG — SIGNIFICANT CHANGE UP (ref 27–31)
MCHC RBC-ENTMCNC: 29.9 PG — SIGNIFICANT CHANGE UP (ref 27–31)
MCHC RBC-ENTMCNC: 30 PG — SIGNIFICANT CHANGE UP (ref 27–31)
MCHC RBC-ENTMCNC: 30.1 PG — SIGNIFICANT CHANGE UP (ref 27–31)
MCHC RBC-ENTMCNC: 30.3 PG — SIGNIFICANT CHANGE UP (ref 27–31)
MCHC RBC-ENTMCNC: 32.2 G/DL — SIGNIFICANT CHANGE UP (ref 32–37)
MCHC RBC-ENTMCNC: 32.2 G/DL — SIGNIFICANT CHANGE UP (ref 32–37)
MCHC RBC-ENTMCNC: 32.3 G/DL — SIGNIFICANT CHANGE UP (ref 32–37)
MCHC RBC-ENTMCNC: 32.4 G/DL — SIGNIFICANT CHANGE UP (ref 32–37)
MCHC RBC-ENTMCNC: 32.5 G/DL — SIGNIFICANT CHANGE UP (ref 32–37)
MCHC RBC-ENTMCNC: 32.6 G/DL — SIGNIFICANT CHANGE UP (ref 32–37)
MCHC RBC-ENTMCNC: 32.6 G/DL — SIGNIFICANT CHANGE UP (ref 32–37)
MCHC RBC-ENTMCNC: 32.7 G/DL — SIGNIFICANT CHANGE UP (ref 32–37)
MCHC RBC-ENTMCNC: 32.8 G/DL — SIGNIFICANT CHANGE UP (ref 32–37)
MCHC RBC-ENTMCNC: 32.8 G/DL — SIGNIFICANT CHANGE UP (ref 32–37)
MCHC RBC-ENTMCNC: 33 G/DL — SIGNIFICANT CHANGE UP (ref 32–37)
MCHC RBC-ENTMCNC: 33 G/DL — SIGNIFICANT CHANGE UP (ref 32–37)
MCHC RBC-ENTMCNC: 33.1 G/DL — SIGNIFICANT CHANGE UP (ref 32–37)
MCHC RBC-ENTMCNC: 33.1 G/DL — SIGNIFICANT CHANGE UP (ref 32–37)
MCHC RBC-ENTMCNC: 33.2 G/DL — SIGNIFICANT CHANGE UP (ref 32–37)
MCHC RBC-ENTMCNC: 33.2 G/DL — SIGNIFICANT CHANGE UP (ref 32–37)
MCV RBC AUTO: 89.3 FL — SIGNIFICANT CHANGE UP (ref 80–94)
MCV RBC AUTO: 89.4 FL — SIGNIFICANT CHANGE UP (ref 80–94)
MCV RBC AUTO: 89.4 FL — SIGNIFICANT CHANGE UP (ref 80–94)
MCV RBC AUTO: 89.8 FL — SIGNIFICANT CHANGE UP (ref 80–94)
MCV RBC AUTO: 89.8 FL — SIGNIFICANT CHANGE UP (ref 80–94)
MCV RBC AUTO: 90.3 FL — SIGNIFICANT CHANGE UP (ref 80–94)
MCV RBC AUTO: 90.3 FL — SIGNIFICANT CHANGE UP (ref 80–94)
MCV RBC AUTO: 90.4 FL — SIGNIFICANT CHANGE UP (ref 80–94)
MCV RBC AUTO: 90.5 FL — SIGNIFICANT CHANGE UP (ref 80–94)
MCV RBC AUTO: 90.6 FL — SIGNIFICANT CHANGE UP (ref 80–94)
MCV RBC AUTO: 90.8 FL — SIGNIFICANT CHANGE UP (ref 80–94)
MCV RBC AUTO: 91 FL — SIGNIFICANT CHANGE UP (ref 80–94)
MCV RBC AUTO: 91.4 FL — SIGNIFICANT CHANGE UP (ref 80–94)
MCV RBC AUTO: 91.5 FL — SIGNIFICANT CHANGE UP (ref 80–94)
MCV RBC AUTO: 91.7 FL — SIGNIFICANT CHANGE UP (ref 80–94)
MCV RBC AUTO: 91.9 FL — SIGNIFICANT CHANGE UP (ref 80–94)
MCV RBC AUTO: 91.9 FL — SIGNIFICANT CHANGE UP (ref 80–94)
MCV RBC AUTO: 92.2 FL — SIGNIFICANT CHANGE UP (ref 80–94)
METAMYELOCYTES # FLD: 1 % — HIGH (ref 0–0)
METHOD TYPE: SIGNIFICANT CHANGE UP
METHOD TYPE: SIGNIFICANT CHANGE UP
MONOCYTES # BLD AUTO: 0.44 K/UL — SIGNIFICANT CHANGE UP (ref 0.1–0.6)
MONOCYTES # BLD AUTO: 0.56 K/UL — SIGNIFICANT CHANGE UP (ref 0.1–0.6)
MONOCYTES # BLD AUTO: 0.64 K/UL — HIGH (ref 0.1–0.6)
MONOCYTES # BLD AUTO: 0.94 K/UL — HIGH (ref 0.1–0.6)
MONOCYTES # BLD AUTO: 1.05 K/UL — HIGH (ref 0.1–0.6)
MONOCYTES # BLD AUTO: 1.2 K/UL — HIGH (ref 0.1–0.6)
MONOCYTES # BLD AUTO: 1.27 K/UL — HIGH (ref 0.1–0.6)
MONOCYTES # BLD AUTO: 1.35 K/UL — HIGH (ref 0.1–0.6)
MONOCYTES # BLD AUTO: 1.46 K/UL — HIGH (ref 0.1–0.6)
MONOCYTES # BLD AUTO: 1.47 K/UL — HIGH (ref 0.1–0.6)
MONOCYTES # BLD AUTO: 1.47 K/UL — HIGH (ref 0.1–0.6)
MONOCYTES # BLD AUTO: 1.54 K/UL — HIGH (ref 0.1–0.6)
MONOCYTES # BLD AUTO: 1.86 K/UL — HIGH (ref 0.1–0.6)
MONOCYTES # BLD AUTO: 1.88 K/UL — HIGH (ref 0.1–0.6)
MONOCYTES # BLD AUTO: 2.04 K/UL — HIGH (ref 0.1–0.6)
MONOCYTES # BLD AUTO: 2.07 K/UL — HIGH (ref 0.1–0.6)
MONOCYTES # BLD AUTO: 2.12 K/UL — HIGH (ref 0.1–0.6)
MONOCYTES # BLD AUTO: 2.19 K/UL — HIGH (ref 0.1–0.6)
MONOCYTES # BLD AUTO: 2.24 K/UL — HIGH (ref 0.1–0.6)
MONOCYTES NFR BLD AUTO: 10 % — HIGH (ref 1.7–9.3)
MONOCYTES NFR BLD AUTO: 3.7 % — SIGNIFICANT CHANGE UP (ref 1.7–9.3)
MONOCYTES NFR BLD AUTO: 4.7 % — SIGNIFICANT CHANGE UP (ref 1.7–9.3)
MONOCYTES NFR BLD AUTO: 5 % — SIGNIFICANT CHANGE UP (ref 1.7–9.3)
MONOCYTES NFR BLD AUTO: 5.6 % — SIGNIFICANT CHANGE UP (ref 1.7–9.3)
MONOCYTES NFR BLD AUTO: 6.1 % — SIGNIFICANT CHANGE UP (ref 1.7–9.3)
MONOCYTES NFR BLD AUTO: 6.2 % — SIGNIFICANT CHANGE UP (ref 1.7–9.3)
MONOCYTES NFR BLD AUTO: 6.7 % — SIGNIFICANT CHANGE UP (ref 1.7–9.3)
MONOCYTES NFR BLD AUTO: 6.7 % — SIGNIFICANT CHANGE UP (ref 1.7–9.3)
MONOCYTES NFR BLD AUTO: 7 % — SIGNIFICANT CHANGE UP (ref 1.7–9.3)
MONOCYTES NFR BLD AUTO: 7.2 % — SIGNIFICANT CHANGE UP (ref 1.7–9.3)
MONOCYTES NFR BLD AUTO: 8.4 % — SIGNIFICANT CHANGE UP (ref 1.7–9.3)
MONOCYTES NFR BLD AUTO: 8.7 % — SIGNIFICANT CHANGE UP (ref 1.7–9.3)
MONOCYTES NFR BLD AUTO: 8.7 % — SIGNIFICANT CHANGE UP (ref 1.7–9.3)
MONOCYTES NFR BLD AUTO: 9 % — SIGNIFICANT CHANGE UP (ref 1.7–9.3)
MONOCYTES NFR BLD AUTO: 9.6 % — HIGH (ref 1.7–9.3)
MONOCYTES NFR BLD AUTO: 9.7 % — HIGH (ref 1.7–9.3)
MONOCYTES NFR BLD AUTO: 9.9 % — HIGH (ref 1.7–9.3)
MONOCYTES NFR BLD AUTO: 9.9 % — HIGH (ref 1.7–9.3)
MRSA PCR RESULT.: NEGATIVE — SIGNIFICANT CHANGE UP
MSSA DNA SPEC QL NAA+PROBE: SIGNIFICANT CHANGE UP
MYELOCYTES NFR BLD: 0.9 % — HIGH (ref 0–0)
NEUTROPHILS # BLD AUTO: 12.28 K/UL — HIGH (ref 1.4–6.5)
NEUTROPHILS # BLD AUTO: 16.11 K/UL — HIGH (ref 1.4–6.5)
NEUTROPHILS # BLD AUTO: 16.14 K/UL — HIGH (ref 1.4–6.5)
NEUTROPHILS # BLD AUTO: 17.96 K/UL — HIGH (ref 1.4–6.5)
NEUTROPHILS # BLD AUTO: 18.08 K/UL — HIGH (ref 1.4–6.5)
NEUTROPHILS # BLD AUTO: 18.11 K/UL — HIGH (ref 1.4–6.5)
NEUTROPHILS # BLD AUTO: 18.44 K/UL — HIGH (ref 1.4–6.5)
NEUTROPHILS # BLD AUTO: 20.7 K/UL — HIGH (ref 1.4–6.5)
NEUTROPHILS # BLD AUTO: 22.23 K/UL — HIGH (ref 1.4–6.5)
NEUTROPHILS # BLD AUTO: 23.11 K/UL — HIGH (ref 1.4–6.5)
NEUTROPHILS # BLD AUTO: 26.59 K/UL — HIGH (ref 1.4–6.5)
NEUTROPHILS # BLD AUTO: 26.83 K/UL — HIGH (ref 1.4–6.5)
NEUTROPHILS # BLD AUTO: 29.13 K/UL — HIGH (ref 1.4–6.5)
NEUTROPHILS # BLD AUTO: 31.5 K/UL — HIGH (ref 1.4–6.5)
NEUTROPHILS # BLD AUTO: 4.28 K/UL — SIGNIFICANT CHANGE UP (ref 1.4–6.5)
NEUTROPHILS # BLD AUTO: 5.45 K/UL — SIGNIFICANT CHANGE UP (ref 1.4–6.5)
NEUTROPHILS # BLD AUTO: 8.67 K/UL — HIGH (ref 1.4–6.5)
NEUTROPHILS # BLD AUTO: 9.18 K/UL — HIGH (ref 1.4–6.5)
NEUTROPHILS # BLD AUTO: 9.94 K/UL — HIGH (ref 1.4–6.5)
NEUTROPHILS NFR BLD AUTO: 68.6 % — SIGNIFICANT CHANGE UP (ref 42.2–75.2)
NEUTROPHILS NFR BLD AUTO: 71.5 % — SIGNIFICANT CHANGE UP (ref 42.2–75.2)
NEUTROPHILS NFR BLD AUTO: 81.6 % — HIGH (ref 42.2–75.2)
NEUTROPHILS NFR BLD AUTO: 81.6 % — HIGH (ref 42.2–75.2)
NEUTROPHILS NFR BLD AUTO: 82 % — HIGH (ref 42.2–75.2)
NEUTROPHILS NFR BLD AUTO: 82.7 % — HIGH (ref 42.2–75.2)
NEUTROPHILS NFR BLD AUTO: 83 % — HIGH (ref 42.2–75.2)
NEUTROPHILS NFR BLD AUTO: 83.2 % — HIGH (ref 42.2–75.2)
NEUTROPHILS NFR BLD AUTO: 83.2 % — HIGH (ref 42.2–75.2)
NEUTROPHILS NFR BLD AUTO: 84.1 % — HIGH (ref 42.2–75.2)
NEUTROPHILS NFR BLD AUTO: 86.2 % — HIGH (ref 42.2–75.2)
NEUTROPHILS NFR BLD AUTO: 86.4 % — HIGH (ref 42.2–75.2)
NEUTROPHILS NFR BLD AUTO: 87.9 % — HIGH (ref 42.2–75.2)
NEUTROPHILS NFR BLD AUTO: 88.2 % — HIGH (ref 42.2–75.2)
NEUTROPHILS NFR BLD AUTO: 89.6 % — HIGH (ref 42.2–75.2)
NEUTROPHILS NFR BLD AUTO: 91.8 % — HIGH (ref 42.2–75.2)
NEUTROPHILS NFR BLD AUTO: 92.2 % — HIGH (ref 42.2–75.2)
NEUTROPHILS NFR BLD AUTO: 92.8 % — HIGH (ref 42.2–75.2)
NEUTROPHILS NFR BLD AUTO: 93 % — HIGH (ref 42.2–75.2)
NEUTS BAND # BLD: 5 % — SIGNIFICANT CHANGE UP (ref 0–6)
NITRITE UR-MCNC: NEGATIVE — SIGNIFICANT CHANGE UP
NITRITE UR-MCNC: NEGATIVE — SIGNIFICANT CHANGE UP
NRBC # BLD: 0 /100 WBCS — SIGNIFICANT CHANGE UP (ref 0–0)
NRBC # BLD: 0 /100 — SIGNIFICANT CHANGE UP (ref 0–0)
NT-PROBNP SERPL-SCNC: 1085 PG/ML — HIGH (ref 0–300)
ORGANISM # SPEC MICROSCOPIC CNT: SIGNIFICANT CHANGE UP
OSMOLALITY UR: 356 MOS/KG — SIGNIFICANT CHANGE UP (ref 50–1200)
PH UR: 5.5 — SIGNIFICANT CHANGE UP (ref 5–8)
PH UR: 6 — SIGNIFICANT CHANGE UP (ref 5–8)
PHOSPHATE SERPL-MCNC: 3.3 MG/DL — SIGNIFICANT CHANGE UP (ref 2.1–4.9)
PHOSPHATE SERPL-MCNC: 6.8 MG/DL — HIGH (ref 2.1–4.9)
PHOSPHATE SERPL-MCNC: 8.6 MG/DL — HIGH (ref 2.1–4.9)
PLAT MORPH BLD: NORMAL — SIGNIFICANT CHANGE UP
PLAT MORPH BLD: NORMAL — SIGNIFICANT CHANGE UP
PLATELET # BLD AUTO: 113 K/UL — LOW (ref 130–400)
PLATELET # BLD AUTO: 119 K/UL — LOW (ref 130–400)
PLATELET # BLD AUTO: 120 K/UL — LOW (ref 130–400)
PLATELET # BLD AUTO: 130 K/UL — SIGNIFICANT CHANGE UP (ref 130–400)
PLATELET # BLD AUTO: 139 K/UL — SIGNIFICANT CHANGE UP (ref 130–400)
PLATELET # BLD AUTO: 157 K/UL — SIGNIFICANT CHANGE UP (ref 130–400)
PLATELET # BLD AUTO: 183 K/UL — SIGNIFICANT CHANGE UP (ref 130–400)
PLATELET # BLD AUTO: 184 K/UL — SIGNIFICANT CHANGE UP (ref 130–400)
PLATELET # BLD AUTO: 187 K/UL — SIGNIFICANT CHANGE UP (ref 130–400)
PLATELET # BLD AUTO: 203 K/UL — SIGNIFICANT CHANGE UP (ref 130–400)
PLATELET # BLD AUTO: 220 K/UL — SIGNIFICANT CHANGE UP (ref 130–400)
PLATELET # BLD AUTO: 243 K/UL — SIGNIFICANT CHANGE UP (ref 130–400)
PLATELET # BLD AUTO: 243 K/UL — SIGNIFICANT CHANGE UP (ref 130–400)
PLATELET # BLD AUTO: 252 K/UL — SIGNIFICANT CHANGE UP (ref 130–400)
PLATELET # BLD AUTO: 266 K/UL — SIGNIFICANT CHANGE UP (ref 130–400)
PLATELET # BLD AUTO: 284 K/UL — SIGNIFICANT CHANGE UP (ref 130–400)
PLATELET # BLD AUTO: 287 K/UL — SIGNIFICANT CHANGE UP (ref 130–400)
PLATELET # BLD AUTO: 298 K/UL — SIGNIFICANT CHANGE UP (ref 130–400)
PLATELET # BLD AUTO: 317 K/UL — SIGNIFICANT CHANGE UP (ref 130–400)
PLATELET # BLD AUTO: 362 K/UL — SIGNIFICANT CHANGE UP (ref 130–400)
POTASSIUM SERPL-MCNC: 4 MMOL/L — SIGNIFICANT CHANGE UP (ref 3.5–5)
POTASSIUM SERPL-MCNC: 4.2 MMOL/L — SIGNIFICANT CHANGE UP (ref 3.5–5)
POTASSIUM SERPL-MCNC: 4.3 MMOL/L — SIGNIFICANT CHANGE UP (ref 3.5–5)
POTASSIUM SERPL-MCNC: 4.3 MMOL/L — SIGNIFICANT CHANGE UP (ref 3.5–5)
POTASSIUM SERPL-MCNC: 4.4 MMOL/L — SIGNIFICANT CHANGE UP (ref 3.5–5)
POTASSIUM SERPL-MCNC: 4.4 MMOL/L — SIGNIFICANT CHANGE UP (ref 3.5–5)
POTASSIUM SERPL-MCNC: 4.5 MMOL/L — SIGNIFICANT CHANGE UP (ref 3.5–5)
POTASSIUM SERPL-MCNC: 4.6 MMOL/L — SIGNIFICANT CHANGE UP (ref 3.5–5)
POTASSIUM SERPL-MCNC: 4.6 MMOL/L — SIGNIFICANT CHANGE UP (ref 3.5–5)
POTASSIUM SERPL-MCNC: 4.7 MMOL/L — SIGNIFICANT CHANGE UP (ref 3.5–5)
POTASSIUM SERPL-MCNC: 4.7 MMOL/L — SIGNIFICANT CHANGE UP (ref 3.5–5)
POTASSIUM SERPL-MCNC: 4.9 MMOL/L — SIGNIFICANT CHANGE UP (ref 3.5–5)
POTASSIUM SERPL-MCNC: 5.2 MMOL/L — HIGH (ref 3.5–5)
POTASSIUM SERPL-MCNC: 5.2 MMOL/L — HIGH (ref 3.5–5)
POTASSIUM SERPL-MCNC: 5.4 MMOL/L — HIGH (ref 3.5–5)
POTASSIUM SERPL-MCNC: 6.1 MMOL/L — CRITICAL HIGH (ref 3.5–5)
POTASSIUM SERPL-MCNC: 6.3 MMOL/L — CRITICAL HIGH (ref 3.5–5)
POTASSIUM SERPL-SCNC: 4 MMOL/L — SIGNIFICANT CHANGE UP (ref 3.5–5)
POTASSIUM SERPL-SCNC: 4.2 MMOL/L — SIGNIFICANT CHANGE UP (ref 3.5–5)
POTASSIUM SERPL-SCNC: 4.3 MMOL/L — SIGNIFICANT CHANGE UP (ref 3.5–5)
POTASSIUM SERPL-SCNC: 4.3 MMOL/L — SIGNIFICANT CHANGE UP (ref 3.5–5)
POTASSIUM SERPL-SCNC: 4.4 MMOL/L — SIGNIFICANT CHANGE UP (ref 3.5–5)
POTASSIUM SERPL-SCNC: 4.4 MMOL/L — SIGNIFICANT CHANGE UP (ref 3.5–5)
POTASSIUM SERPL-SCNC: 4.5 MMOL/L — SIGNIFICANT CHANGE UP (ref 3.5–5)
POTASSIUM SERPL-SCNC: 4.6 MMOL/L — SIGNIFICANT CHANGE UP (ref 3.5–5)
POTASSIUM SERPL-SCNC: 4.6 MMOL/L — SIGNIFICANT CHANGE UP (ref 3.5–5)
POTASSIUM SERPL-SCNC: 4.7 MMOL/L — SIGNIFICANT CHANGE UP (ref 3.5–5)
POTASSIUM SERPL-SCNC: 4.7 MMOL/L — SIGNIFICANT CHANGE UP (ref 3.5–5)
POTASSIUM SERPL-SCNC: 4.9 MMOL/L — SIGNIFICANT CHANGE UP (ref 3.5–5)
POTASSIUM SERPL-SCNC: 5.2 MMOL/L — HIGH (ref 3.5–5)
POTASSIUM SERPL-SCNC: 5.2 MMOL/L — HIGH (ref 3.5–5)
POTASSIUM SERPL-SCNC: 5.4 MMOL/L — HIGH (ref 3.5–5)
POTASSIUM SERPL-SCNC: 6.1 MMOL/L — CRITICAL HIGH (ref 3.5–5)
POTASSIUM SERPL-SCNC: 6.3 MMOL/L — CRITICAL HIGH (ref 3.5–5)
POTASSIUM UR-SCNC: 51 MMOL/L — SIGNIFICANT CHANGE UP
PROCALCITONIN SERPL-MCNC: 0.13 NG/ML — HIGH (ref 0.02–0.1)
PROCALCITONIN SERPL-MCNC: 0.15 NG/ML — HIGH (ref 0.02–0.1)
PROCALCITONIN SERPL-MCNC: 0.17 NG/ML — HIGH (ref 0.02–0.1)
PROCALCITONIN SERPL-MCNC: 0.48 NG/ML — HIGH (ref 0.02–0.1)
PROCALCITONIN SERPL-MCNC: 0.75 NG/ML — HIGH (ref 0.02–0.1)
PROCALCITONIN SERPL-MCNC: 0.83 NG/ML — HIGH (ref 0.02–0.1)
PROCALCITONIN SERPL-MCNC: 0.84 NG/ML — HIGH (ref 0.02–0.1)
PROT C ACT/NOR PPP: 96 % — SIGNIFICANT CHANGE UP (ref 65–129)
PROT S FREE AG PPP IA-ACNC: 16 % — LOW (ref 67–141)
PROT S FREE PPP-ACNC: 23 % NORMAL — LOW (ref 70–150)
PROT SERPL-MCNC: 4.5 G/DL — LOW (ref 6–8)
PROT SERPL-MCNC: 4.6 G/DL — LOW (ref 6–8)
PROT SERPL-MCNC: 4.6 G/DL — LOW (ref 6–8)
PROT SERPL-MCNC: 5.1 G/DL — LOW (ref 6–8)
PROT SERPL-MCNC: 5.2 G/DL — LOW (ref 6–8)
PROT SERPL-MCNC: 5.2 G/DL — LOW (ref 6–8)
PROT SERPL-MCNC: 5.4 G/DL — LOW (ref 6–8)
PROT SERPL-MCNC: 5.5 G/DL — LOW (ref 6–8)
PROT SERPL-MCNC: 5.5 G/DL — LOW (ref 6–8)
PROT SERPL-MCNC: 5.7 G/DL — LOW (ref 6–8)
PROT SERPL-MCNC: 5.7 G/DL — LOW (ref 6–8)
PROT SERPL-MCNC: 5.8 G/DL — LOW (ref 6–8)
PROT SERPL-MCNC: 5.9 G/DL — LOW (ref 6–8)
PROT SERPL-MCNC: 6 G/DL — SIGNIFICANT CHANGE UP (ref 6–8)
PROT SERPL-MCNC: 6 G/DL — SIGNIFICANT CHANGE UP (ref 6–8)
PROT SERPL-MCNC: 6.1 G/DL — SIGNIFICANT CHANGE UP (ref 6–8)
PROT SERPL-MCNC: 6.3 G/DL — SIGNIFICANT CHANGE UP (ref 6–8)
PROT SERPL-MCNC: 6.4 G/DL — SIGNIFICANT CHANGE UP (ref 6–8)
PROT SERPL-MCNC: 6.7 G/DL — SIGNIFICANT CHANGE UP (ref 6–8)
PROT UR-MCNC: ABNORMAL
PROT UR-MCNC: NEGATIVE MG/DL — SIGNIFICANT CHANGE UP
PROTHROM AB SERPL-ACNC: 11.6 SEC — SIGNIFICANT CHANGE UP (ref 9.95–12.87)
PROTHROM AB SERPL-ACNC: 11.7 SEC — SIGNIFICANT CHANGE UP (ref 9.95–12.87)
PROTHROM AB SERPL-ACNC: 12.9 SEC — HIGH (ref 9.95–12.87)
PROTHROM AB SERPL-ACNC: 12.9 SEC — HIGH (ref 9.95–12.87)
PROTHROM AB SERPL-ACNC: 13 SEC — HIGH (ref 9.95–12.87)
PROTHROM AB SERPL-ACNC: 14.6 SEC — HIGH (ref 9.95–12.87)
RBC # BLD: 3.91 M/UL — LOW (ref 4.7–6.1)
RBC # BLD: 3.92 M/UL — LOW (ref 4.7–6.1)
RBC # BLD: 4.04 M/UL — LOW (ref 4.7–6.1)
RBC # BLD: 4.59 M/UL — LOW (ref 4.7–6.1)
RBC # BLD: 5 M/UL — SIGNIFICANT CHANGE UP (ref 4.7–6.1)
RBC # BLD: 5.19 M/UL — SIGNIFICANT CHANGE UP (ref 4.7–6.1)
RBC # BLD: 5.21 M/UL — SIGNIFICANT CHANGE UP (ref 4.7–6.1)
RBC # BLD: 5.28 M/UL — SIGNIFICANT CHANGE UP (ref 4.7–6.1)
RBC # BLD: 5.3 M/UL — SIGNIFICANT CHANGE UP (ref 4.7–6.1)
RBC # BLD: 5.36 M/UL — SIGNIFICANT CHANGE UP (ref 4.7–6.1)
RBC # BLD: 5.4 M/UL — SIGNIFICANT CHANGE UP (ref 4.7–6.1)
RBC # BLD: 5.48 M/UL — SIGNIFICANT CHANGE UP (ref 4.7–6.1)
RBC # BLD: 5.52 M/UL — SIGNIFICANT CHANGE UP (ref 4.7–6.1)
RBC # BLD: 5.53 M/UL — SIGNIFICANT CHANGE UP (ref 4.7–6.1)
RBC # BLD: 5.55 M/UL — SIGNIFICANT CHANGE UP (ref 4.7–6.1)
RBC # BLD: 5.56 M/UL — SIGNIFICANT CHANGE UP (ref 4.7–6.1)
RBC # BLD: 5.62 M/UL — SIGNIFICANT CHANGE UP (ref 4.7–6.1)
RBC # BLD: 5.65 M/UL — SIGNIFICANT CHANGE UP (ref 4.7–6.1)
RBC # BLD: 5.67 M/UL — SIGNIFICANT CHANGE UP (ref 4.7–6.1)
RBC # BLD: 5.75 M/UL — SIGNIFICANT CHANGE UP (ref 4.7–6.1)
RBC # FLD: 13.4 % — SIGNIFICANT CHANGE UP (ref 11.5–14.5)
RBC # FLD: 13.5 % — SIGNIFICANT CHANGE UP (ref 11.5–14.5)
RBC # FLD: 13.7 % — SIGNIFICANT CHANGE UP (ref 11.5–14.5)
RBC # FLD: 13.7 % — SIGNIFICANT CHANGE UP (ref 11.5–14.5)
RBC # FLD: 13.8 % — SIGNIFICANT CHANGE UP (ref 11.5–14.5)
RBC # FLD: 13.9 % — SIGNIFICANT CHANGE UP (ref 11.5–14.5)
RBC # FLD: 14 % — SIGNIFICANT CHANGE UP (ref 11.5–14.5)
RBC # FLD: 14 % — SIGNIFICANT CHANGE UP (ref 11.5–14.5)
RBC # FLD: 14.1 % — SIGNIFICANT CHANGE UP (ref 11.5–14.5)
RBC # FLD: 14.5 % — SIGNIFICANT CHANGE UP (ref 11.5–14.5)
RBC # FLD: 14.6 % — HIGH (ref 11.5–14.5)
RBC # FLD: 14.9 % — HIGH (ref 11.5–14.5)
RBC # FLD: 15.4 % — HIGH (ref 11.5–14.5)
RBC BLD AUTO: NORMAL — SIGNIFICANT CHANGE UP
RBC BLD AUTO: NORMAL — SIGNIFICANT CHANGE UP
RBC CASTS # UR COMP ASSIST: 5 /HPF — HIGH (ref 0–4)
SARS-COV-2 IGG SERPL QL IA: POSITIVE
SARS-COV-2 IGM SERPL IA-ACNC: 6.3 INDEX — HIGH
SMUDGE CELLS # BLD: PRESENT — SIGNIFICANT CHANGE UP
SODIUM SERPL-SCNC: 126 MMOL/L — LOW (ref 135–146)
SODIUM SERPL-SCNC: 130 MMOL/L — LOW (ref 135–146)
SODIUM SERPL-SCNC: 130 MMOL/L — LOW (ref 135–146)
SODIUM SERPL-SCNC: 132 MMOL/L — LOW (ref 135–146)
SODIUM SERPL-SCNC: 134 MMOL/L — LOW (ref 135–146)
SODIUM SERPL-SCNC: 135 MMOL/L — SIGNIFICANT CHANGE UP (ref 135–146)
SODIUM SERPL-SCNC: 136 MMOL/L — SIGNIFICANT CHANGE UP (ref 135–146)
SODIUM SERPL-SCNC: 136 MMOL/L — SIGNIFICANT CHANGE UP (ref 135–146)
SODIUM SERPL-SCNC: 137 MMOL/L — SIGNIFICANT CHANGE UP (ref 135–146)
SODIUM SERPL-SCNC: 137 MMOL/L — SIGNIFICANT CHANGE UP (ref 135–146)
SODIUM SERPL-SCNC: 138 MMOL/L — SIGNIFICANT CHANGE UP (ref 135–146)
SODIUM SERPL-SCNC: 138 MMOL/L — SIGNIFICANT CHANGE UP (ref 135–146)
SODIUM SERPL-SCNC: 139 MMOL/L — SIGNIFICANT CHANGE UP (ref 135–146)
SODIUM SERPL-SCNC: 140 MMOL/L — SIGNIFICANT CHANGE UP (ref 135–146)
SODIUM SERPL-SCNC: 140 MMOL/L — SIGNIFICANT CHANGE UP (ref 135–146)
SODIUM SERPL-SCNC: 141 MMOL/L — SIGNIFICANT CHANGE UP (ref 135–146)
SODIUM SERPL-SCNC: 142 MMOL/L — SIGNIFICANT CHANGE UP (ref 135–146)
SODIUM SERPL-SCNC: 143 MMOL/L — SIGNIFICANT CHANGE UP (ref 135–146)
SODIUM UR-SCNC: 33 MMOL/L — SIGNIFICANT CHANGE UP
SP GR SPEC: 1.02 — SIGNIFICANT CHANGE UP (ref 1.01–1.03)
SP GR SPEC: 1.02 — SIGNIFICANT CHANGE UP (ref 1.01–1.03)
SPECIMEN SOURCE: SIGNIFICANT CHANGE UP
TOXIC GRANULES BLD QL SMEAR: PRESENT — SIGNIFICANT CHANGE UP
TROPONIN T SERPL-MCNC: 0.03 NG/ML — CRITICAL HIGH
TROPONIN T SERPL-MCNC: 0.04 NG/ML — CRITICAL HIGH
TROPONIN T SERPL-MCNC: 0.04 NG/ML — CRITICAL HIGH
TROPONIN T SERPL-MCNC: <0.01 NG/ML — SIGNIFICANT CHANGE UP
UROBILINOGEN FLD QL: 0.2 MG/DL — SIGNIFICANT CHANGE UP (ref 0.2–0.2)
UROBILINOGEN FLD QL: SIGNIFICANT CHANGE UP
VARIANT LYMPHS # BLD: 2 % — SIGNIFICANT CHANGE UP (ref 0–5)
WBC # BLD: 11.25 K/UL — HIGH (ref 4.8–10.8)
WBC # BLD: 12.12 K/UL — HIGH (ref 4.8–10.8)
WBC # BLD: 12.12 K/UL — HIGH (ref 4.8–10.8)
WBC # BLD: 14.61 K/UL — HIGH (ref 4.8–10.8)
WBC # BLD: 17.5 K/UL — HIGH (ref 4.8–10.8)
WBC # BLD: 19.36 K/UL — HIGH (ref 4.8–10.8)
WBC # BLD: 20.82 K/UL — HIGH (ref 4.8–10.8)
WBC # BLD: 21.81 K/UL — HIGH (ref 4.8–10.8)
WBC # BLD: 21.87 K/UL — HIGH (ref 4.8–10.8)
WBC # BLD: 22.59 K/UL — HIGH (ref 4.8–10.8)
WBC # BLD: 24 K/UL — HIGH (ref 4.8–10.8)
WBC # BLD: 24.82 K/UL — HIGH (ref 4.8–10.8)
WBC # BLD: 26.21 K/UL — HIGH (ref 4.8–10.8)
WBC # BLD: 29.25 K/UL — HIGH (ref 4.8–10.8)
WBC # BLD: 30.23 K/UL — HIGH (ref 4.8–10.8)
WBC # BLD: 30.27 K/UL — HIGH (ref 4.8–10.8)
WBC # BLD: 31.36 K/UL — HIGH (ref 4.8–10.8)
WBC # BLD: 33.87 K/UL — HIGH (ref 4.8–10.8)
WBC # BLD: 6.24 K/UL — SIGNIFICANT CHANGE UP (ref 4.8–10.8)
WBC # BLD: 6.55 K/UL — SIGNIFICANT CHANGE UP (ref 4.8–10.8)
WBC # FLD AUTO: 11.25 K/UL — HIGH (ref 4.8–10.8)
WBC # FLD AUTO: 12.12 K/UL — HIGH (ref 4.8–10.8)
WBC # FLD AUTO: 12.12 K/UL — HIGH (ref 4.8–10.8)
WBC # FLD AUTO: 14.61 K/UL — HIGH (ref 4.8–10.8)
WBC # FLD AUTO: 17.5 K/UL — HIGH (ref 4.8–10.8)
WBC # FLD AUTO: 19.36 K/UL — HIGH (ref 4.8–10.8)
WBC # FLD AUTO: 20.82 K/UL — HIGH (ref 4.8–10.8)
WBC # FLD AUTO: 21.81 K/UL — HIGH (ref 4.8–10.8)
WBC # FLD AUTO: 21.87 K/UL — HIGH (ref 4.8–10.8)
WBC # FLD AUTO: 22.59 K/UL — HIGH (ref 4.8–10.8)
WBC # FLD AUTO: 24 K/UL — HIGH (ref 4.8–10.8)
WBC # FLD AUTO: 24.82 K/UL — HIGH (ref 4.8–10.8)
WBC # FLD AUTO: 26.21 K/UL — HIGH (ref 4.8–10.8)
WBC # FLD AUTO: 29.25 K/UL — HIGH (ref 4.8–10.8)
WBC # FLD AUTO: 30.23 K/UL — HIGH (ref 4.8–10.8)
WBC # FLD AUTO: 30.27 K/UL — HIGH (ref 4.8–10.8)
WBC # FLD AUTO: 31.36 K/UL — HIGH (ref 4.8–10.8)
WBC # FLD AUTO: 33.87 K/UL — HIGH (ref 4.8–10.8)
WBC # FLD AUTO: 6.24 K/UL — SIGNIFICANT CHANGE UP (ref 4.8–10.8)
WBC # FLD AUTO: 6.55 K/UL — SIGNIFICANT CHANGE UP (ref 4.8–10.8)
WBC UR QL: 14 /HPF — HIGH (ref 0–5)

## 2020-01-01 PROCEDURE — 99233 SBSQ HOSP IP/OBS HIGH 50: CPT

## 2020-01-01 PROCEDURE — 93010 ELECTROCARDIOGRAM REPORT: CPT

## 2020-01-01 PROCEDURE — 71045 X-RAY EXAM CHEST 1 VIEW: CPT | Mod: 26

## 2020-01-01 PROCEDURE — 93000 ELECTROCARDIOGRAM COMPLETE: CPT

## 2020-01-01 PROCEDURE — 99232 SBSQ HOSP IP/OBS MODERATE 35: CPT

## 2020-01-01 PROCEDURE — 93306 TTE W/DOPPLER COMPLETE: CPT

## 2020-01-01 PROCEDURE — 72040 X-RAY EXAM NECK SPINE 2-3 VW: CPT | Mod: 26

## 2020-01-01 PROCEDURE — 99291 CRITICAL CARE FIRST HOUR: CPT

## 2020-01-01 PROCEDURE — 76770 US EXAM ABDO BACK WALL COMP: CPT | Mod: 26

## 2020-01-01 PROCEDURE — 99214 OFFICE O/P EST MOD 30 MIN: CPT

## 2020-01-01 PROCEDURE — 93010 ELECTROCARDIOGRAM REPORT: CPT | Mod: 77

## 2020-01-01 PROCEDURE — 74174 CTA ABD&PLVS W/CONTRAST: CPT | Mod: 26

## 2020-01-01 PROCEDURE — 93970 EXTREMITY STUDY: CPT | Mod: 26

## 2020-01-01 PROCEDURE — 71045 X-RAY EXAM CHEST 1 VIEW: CPT | Mod: 26,77

## 2020-01-01 PROCEDURE — 99223 1ST HOSP IP/OBS HIGH 75: CPT | Mod: 25

## 2020-01-01 PROCEDURE — 99222 1ST HOSP IP/OBS MODERATE 55: CPT

## 2020-01-01 PROCEDURE — 93306 TTE W/DOPPLER COMPLETE: CPT | Mod: 26

## 2020-01-01 PROCEDURE — 99285 EMERGENCY DEPT VISIT HI MDM: CPT

## 2020-01-01 RX ORDER — FUROSEMIDE 40 MG
40 TABLET ORAL ONCE
Refills: 0 | Status: COMPLETED | OUTPATIENT
Start: 2020-01-01 | End: 2020-01-01

## 2020-01-01 RX ORDER — MORPHINE SULFATE 50 MG/1
1 CAPSULE, EXTENDED RELEASE ORAL EVERY 4 HOURS
Refills: 0 | Status: DISCONTINUED | OUTPATIENT
Start: 2020-01-01 | End: 2020-01-01

## 2020-01-01 RX ORDER — MORPHINE SULFATE 50 MG/1
2 CAPSULE, EXTENDED RELEASE ORAL ONCE
Refills: 0 | Status: DISCONTINUED | OUTPATIENT
Start: 2020-01-01 | End: 2020-01-01

## 2020-01-01 RX ORDER — SEVELAMER CARBONATE 2400 MG/1
800 POWDER, FOR SUSPENSION ORAL
Refills: 0 | Status: DISCONTINUED | OUTPATIENT
Start: 2020-01-01 | End: 2020-11-29

## 2020-01-01 RX ORDER — SODIUM ZIRCONIUM CYCLOSILICATE 10 G/10G
10 POWDER, FOR SUSPENSION ORAL
Refills: 0 | Status: DISCONTINUED | OUTPATIENT
Start: 2020-01-01 | End: 2020-11-29

## 2020-01-01 RX ORDER — LISINOPRIL 10 MG/1
10 TABLET ORAL
Qty: 90 | Refills: 3 | Status: ACTIVE | COMMUNITY
Start: 1900-01-01 | End: 1900-01-01

## 2020-01-01 RX ORDER — MECLIZINE HYDROCHLORIDE 25 MG/1
25 TABLET ORAL 3 TIMES DAILY
Refills: 0 | Status: ACTIVE | COMMUNITY
Start: 2019-07-15

## 2020-01-01 RX ORDER — SODIUM CHLORIDE 9 MG/ML
1000 INJECTION INTRAMUSCULAR; INTRAVENOUS; SUBCUTANEOUS
Refills: 0 | Status: DISCONTINUED | OUTPATIENT
Start: 2020-01-01 | End: 2020-01-01

## 2020-01-01 RX ORDER — ENOXAPARIN SODIUM 100 MG/ML
80 INJECTION SUBCUTANEOUS
Refills: 0 | Status: DISCONTINUED | OUTPATIENT
Start: 2020-01-01 | End: 2020-01-01

## 2020-01-01 RX ORDER — CEFAZOLIN SODIUM 1 G
1000 VIAL (EA) INJECTION EVERY 8 HOURS
Refills: 0 | Status: DISCONTINUED | OUTPATIENT
Start: 2020-01-01 | End: 2020-01-01

## 2020-01-01 RX ORDER — ATORVASTATIN CALCIUM 80 MG/1
80 TABLET, FILM COATED ORAL AT BEDTIME
Refills: 0 | Status: DISCONTINUED | OUTPATIENT
Start: 2020-01-01 | End: 2020-11-29

## 2020-01-01 RX ORDER — AMLODIPINE BESYLATE 2.5 MG/1
1 TABLET ORAL
Qty: 0 | Refills: 0 | DISCHARGE

## 2020-01-01 RX ORDER — INSULIN HUMAN 100 [IU]/ML
10 INJECTION, SOLUTION SUBCUTANEOUS ONCE
Refills: 0 | Status: COMPLETED | OUTPATIENT
Start: 2020-01-01 | End: 2020-01-01

## 2020-01-01 RX ORDER — AMIODARONE HYDROCHLORIDE 400 MG/1
1 TABLET ORAL
Qty: 900 | Refills: 0 | Status: DISCONTINUED | OUTPATIENT
Start: 2020-01-01 | End: 2020-01-01

## 2020-01-01 RX ORDER — LISINOPRIL 2.5 MG/1
10 TABLET ORAL DAILY
Refills: 0 | Status: DISCONTINUED | OUTPATIENT
Start: 2020-01-01 | End: 2020-01-01

## 2020-01-01 RX ORDER — FUROSEMIDE 40 MG
40 TABLET ORAL EVERY 12 HOURS
Refills: 0 | Status: DISCONTINUED | OUTPATIENT
Start: 2020-01-01 | End: 2020-01-01

## 2020-01-01 RX ORDER — MORPHINE SULFATE 50 MG/1
2 CAPSULE, EXTENDED RELEASE ORAL EVERY 4 HOURS
Refills: 0 | Status: DISCONTINUED | OUTPATIENT
Start: 2020-01-01 | End: 2020-11-29

## 2020-01-01 RX ORDER — ATORVASTATIN CALCIUM 80 MG/1
80 TABLET, FILM COATED ORAL
Qty: 90 | Refills: 3 | Status: ACTIVE | COMMUNITY
Start: 1900-01-01 | End: 1900-01-01

## 2020-01-01 RX ORDER — HEPARIN SODIUM 5000 [USP'U]/ML
1000 INJECTION INTRAVENOUS; SUBCUTANEOUS
Qty: 25000 | Refills: 0 | Status: DISCONTINUED | OUTPATIENT
Start: 2020-01-01 | End: 2020-01-01

## 2020-01-01 RX ORDER — REMDESIVIR 5 MG/ML
100 INJECTION INTRAVENOUS EVERY 24 HOURS
Refills: 0 | Status: DISCONTINUED | OUTPATIENT
Start: 2020-01-01 | End: 2020-01-01

## 2020-01-01 RX ORDER — METOPROLOL TARTRATE 50 MG
1 TABLET ORAL
Qty: 0 | Refills: 0 | DISCHARGE

## 2020-01-01 RX ORDER — OXYCODONE AND ACETAMINOPHEN 5; 325 MG/1; MG/1
1 TABLET ORAL EVERY 4 HOURS
Refills: 0 | Status: DISCONTINUED | OUTPATIENT
Start: 2020-01-01 | End: 2020-01-01

## 2020-01-01 RX ORDER — SIMETHICONE 80 MG/1
80 TABLET, CHEWABLE ORAL ONCE
Refills: 0 | Status: COMPLETED | OUTPATIENT
Start: 2020-01-01 | End: 2020-01-01

## 2020-01-01 RX ORDER — QUETIAPINE FUMARATE 200 MG/1
25 TABLET, FILM COATED ORAL AT BEDTIME
Refills: 0 | Status: DISCONTINUED | OUTPATIENT
Start: 2020-01-01 | End: 2020-11-29

## 2020-01-01 RX ORDER — DEXTROSE 50 % IN WATER 50 %
50 SYRINGE (ML) INTRAVENOUS ONCE
Refills: 0 | Status: COMPLETED | OUTPATIENT
Start: 2020-01-01 | End: 2020-01-01

## 2020-01-01 RX ORDER — QUETIAPINE FUMARATE 200 MG/1
25 TABLET, FILM COATED ORAL AT BEDTIME
Refills: 0 | Status: DISCONTINUED | OUTPATIENT
Start: 2020-01-01 | End: 2020-01-01

## 2020-01-01 RX ORDER — METOPROLOL SUCCINATE 50 MG/1
50 TABLET, EXTENDED RELEASE ORAL DAILY
Qty: 90 | Refills: 3 | Status: ACTIVE | COMMUNITY
Start: 1900-01-01 | End: 1900-01-01

## 2020-01-01 RX ORDER — METOPROLOL TARTRATE 50 MG
25 TABLET ORAL
Refills: 0 | Status: DISCONTINUED | OUTPATIENT
Start: 2020-01-01 | End: 2020-01-01

## 2020-01-01 RX ORDER — CASPOFUNGIN ACETATE 7 MG/ML
INJECTION, POWDER, LYOPHILIZED, FOR SOLUTION INTRAVENOUS
Refills: 0 | Status: DISCONTINUED | OUTPATIENT
Start: 2020-01-01 | End: 2020-01-01

## 2020-01-01 RX ORDER — TRAMADOL HYDROCHLORIDE 50 MG/1
25 TABLET ORAL ONCE
Refills: 0 | Status: DISCONTINUED | OUTPATIENT
Start: 2020-01-01 | End: 2020-01-01

## 2020-01-01 RX ORDER — LISINOPRIL 2.5 MG/1
1 TABLET ORAL
Qty: 0 | Refills: 0 | DISCHARGE

## 2020-01-01 RX ORDER — AMIODARONE HYDROCHLORIDE 400 MG/1
150 TABLET ORAL ONCE
Refills: 0 | Status: COMPLETED | OUTPATIENT
Start: 2020-01-01 | End: 2020-01-01

## 2020-01-01 RX ORDER — CALCIUM GLUCONATE 100 MG/ML
2 VIAL (ML) INTRAVENOUS ONCE
Refills: 0 | Status: COMPLETED | OUTPATIENT
Start: 2020-01-01 | End: 2020-01-01

## 2020-01-01 RX ORDER — DEXAMETHASONE 0.5 MG/5ML
6 ELIXIR ORAL ONCE
Refills: 0 | Status: COMPLETED | OUTPATIENT
Start: 2020-01-01 | End: 2020-01-01

## 2020-01-01 RX ORDER — PANTOPRAZOLE SODIUM 20 MG/1
40 TABLET, DELAYED RELEASE ORAL
Refills: 0 | Status: DISCONTINUED | OUTPATIENT
Start: 2020-01-01 | End: 2020-11-29

## 2020-01-01 RX ORDER — ATORVASTATIN CALCIUM 80 MG/1
1 TABLET, FILM COATED ORAL
Qty: 0 | Refills: 0 | DISCHARGE

## 2020-01-01 RX ORDER — CASPOFUNGIN ACETATE 7 MG/ML
50 INJECTION, POWDER, LYOPHILIZED, FOR SOLUTION INTRAVENOUS ONCE
Refills: 0 | Status: COMPLETED | OUTPATIENT
Start: 2020-01-01 | End: 2020-01-01

## 2020-01-01 RX ORDER — BACITRACIN ZINC 500 UNIT/G
1 OINTMENT IN PACKET (EA) TOPICAL
Refills: 0 | Status: DISCONTINUED | OUTPATIENT
Start: 2020-01-01 | End: 2020-11-29

## 2020-01-01 RX ORDER — NOREPINEPHRINE BITARTRATE/D5W 8 MG/250ML
0.05 PLASTIC BAG, INJECTION (ML) INTRAVENOUS
Qty: 8 | Refills: 0 | Status: DISCONTINUED | OUTPATIENT
Start: 2020-01-01 | End: 2020-11-29

## 2020-01-01 RX ORDER — LISINOPRIL 2.5 MG/1
10 TABLET ORAL ONCE
Refills: 0 | Status: COMPLETED | OUTPATIENT
Start: 2020-01-01 | End: 2020-01-01

## 2020-01-01 RX ORDER — METOPROLOL TARTRATE 50 MG
5 TABLET ORAL ONCE
Refills: 0 | Status: COMPLETED | OUTPATIENT
Start: 2020-01-01 | End: 2020-01-01

## 2020-01-01 RX ORDER — MORPHINE SULFATE 50 MG/1
4 CAPSULE, EXTENDED RELEASE ORAL ONCE
Refills: 0 | Status: DISCONTINUED | OUTPATIENT
Start: 2020-01-01 | End: 2020-01-01

## 2020-01-01 RX ORDER — HEPARIN SODIUM 5000 [USP'U]/ML
1300 INJECTION INTRAVENOUS; SUBCUTANEOUS
Qty: 25000 | Refills: 0 | Status: DISCONTINUED | OUTPATIENT
Start: 2020-01-01 | End: 2020-01-01

## 2020-01-01 RX ORDER — SODIUM CHLORIDE 9 MG/ML
250 INJECTION INTRAMUSCULAR; INTRAVENOUS; SUBCUTANEOUS ONCE
Refills: 0 | Status: COMPLETED | OUTPATIENT
Start: 2020-01-01 | End: 2020-01-01

## 2020-01-01 RX ORDER — ASPIRIN/CALCIUM CARB/MAGNESIUM 324 MG
81 TABLET ORAL DAILY
Refills: 0 | Status: DISCONTINUED | OUTPATIENT
Start: 2020-01-01 | End: 2020-11-29

## 2020-01-01 RX ORDER — NAFCILLIN 10 G/100ML
2 INJECTION, POWDER, FOR SOLUTION INTRAVENOUS EVERY 4 HOURS
Refills: 0 | Status: DISCONTINUED | OUTPATIENT
Start: 2020-01-01 | End: 2020-11-29

## 2020-01-01 RX ORDER — NAFCILLIN 10 G/100ML
2 INJECTION, POWDER, FOR SOLUTION INTRAVENOUS EVERY 4 HOURS
Refills: 0 | Status: DISCONTINUED | OUTPATIENT
Start: 2020-01-01 | End: 2020-01-01

## 2020-01-01 RX ORDER — METOPROLOL TARTRATE 50 MG
5 TABLET ORAL EVERY 6 HOURS
Refills: 0 | Status: DISCONTINUED | OUTPATIENT
Start: 2020-01-01 | End: 2020-01-01

## 2020-01-01 RX ORDER — LIDOCAINE 4 G/100G
1 CREAM TOPICAL DAILY
Refills: 0 | Status: DISCONTINUED | OUTPATIENT
Start: 2020-01-01 | End: 2020-11-29

## 2020-01-01 RX ORDER — CEFAZOLIN SODIUM 1 G
2000 VIAL (EA) INJECTION EVERY 8 HOURS
Refills: 0 | Status: DISCONTINUED | OUTPATIENT
Start: 2020-01-01 | End: 2020-01-01

## 2020-01-01 RX ORDER — ONDANSETRON 8 MG/1
4 TABLET, FILM COATED ORAL ONCE
Refills: 0 | Status: COMPLETED | OUTPATIENT
Start: 2020-01-01 | End: 2020-01-01

## 2020-01-01 RX ORDER — MEROPENEM 1 G/30ML
1000 INJECTION INTRAVENOUS EVERY 8 HOURS
Refills: 0 | Status: DISCONTINUED | OUTPATIENT
Start: 2020-01-01 | End: 2020-01-01

## 2020-01-01 RX ORDER — HEPARIN SODIUM 5000 [USP'U]/ML
INJECTION INTRAVENOUS; SUBCUTANEOUS
Qty: 25000 | Refills: 0 | Status: DISCONTINUED | OUTPATIENT
Start: 2020-01-01 | End: 2020-01-01

## 2020-01-01 RX ORDER — METOPROLOL TARTRATE 50 MG
5 TABLET ORAL EVERY 8 HOURS
Refills: 0 | Status: DISCONTINUED | OUTPATIENT
Start: 2020-01-01 | End: 2020-01-01

## 2020-01-01 RX ORDER — METOPROLOL TARTRATE 50 MG
12.5 TABLET ORAL EVERY 12 HOURS
Refills: 0 | Status: DISCONTINUED | OUTPATIENT
Start: 2020-01-01 | End: 2020-01-01

## 2020-01-01 RX ORDER — LIDOCAINE 4 G/100G
1 CREAM TOPICAL ONCE
Refills: 0 | Status: COMPLETED | OUTPATIENT
Start: 2020-01-01 | End: 2020-01-01

## 2020-01-01 RX ORDER — POTASSIUM CHLORIDE 20 MEQ
40 PACKET (EA) ORAL EVERY 4 HOURS
Refills: 0 | Status: DISCONTINUED | OUTPATIENT
Start: 2020-01-01 | End: 2020-01-01

## 2020-01-01 RX ORDER — SODIUM ZIRCONIUM CYCLOSILICATE 10 G/10G
5 POWDER, FOR SUSPENSION ORAL
Refills: 0 | Status: DISCONTINUED | OUTPATIENT
Start: 2020-01-01 | End: 2020-01-01

## 2020-01-01 RX ORDER — DEXAMETHASONE 0.5 MG/5ML
6 ELIXIR ORAL DAILY
Refills: 0 | Status: DISCONTINUED | OUTPATIENT
Start: 2020-01-01 | End: 2020-11-29

## 2020-01-01 RX ORDER — CEFAZOLIN SODIUM 1 G
1000 VIAL (EA) INJECTION ONCE
Refills: 0 | Status: COMPLETED | OUTPATIENT
Start: 2020-01-01 | End: 2020-01-01

## 2020-01-01 RX ORDER — ACETAMINOPHEN 500 MG
650 TABLET ORAL EVERY 4 HOURS
Refills: 0 | Status: DISCONTINUED | OUTPATIENT
Start: 2020-01-01 | End: 2020-11-29

## 2020-01-01 RX ORDER — LISINOPRIL 2.5 MG/1
20 TABLET ORAL DAILY
Refills: 0 | Status: DISCONTINUED | OUTPATIENT
Start: 2020-01-01 | End: 2020-01-01

## 2020-01-01 RX ORDER — ASPIRIN/CALCIUM CARB/MAGNESIUM 324 MG
1 TABLET ORAL
Qty: 0 | Refills: 0 | DISCHARGE

## 2020-01-01 RX ORDER — METOPROLOL TARTRATE 50 MG
5 TABLET ORAL EVERY 8 HOURS
Refills: 0 | Status: DISCONTINUED | OUTPATIENT
Start: 2020-01-01 | End: 2020-11-29

## 2020-01-01 RX ORDER — MORPHINE SULFATE 50 MG/1
3 CAPSULE, EXTENDED RELEASE ORAL ONCE
Refills: 0 | Status: DISCONTINUED | OUTPATIENT
Start: 2020-01-01 | End: 2020-01-01

## 2020-01-01 RX ORDER — TOCILIZUMAB 20 MG/ML
400 INJECTION, SOLUTION, CONCENTRATE INTRAVENOUS ONCE
Refills: 0 | Status: COMPLETED | OUTPATIENT
Start: 2020-01-01 | End: 2020-01-01

## 2020-01-01 RX ORDER — AMIODARONE HYDROCHLORIDE 400 MG/1
0.5 TABLET ORAL
Qty: 900 | Refills: 0 | Status: DISCONTINUED | OUTPATIENT
Start: 2020-01-01 | End: 2020-01-01

## 2020-01-01 RX ORDER — REMDESIVIR 5 MG/ML
200 INJECTION INTRAVENOUS EVERY 24 HOURS
Refills: 0 | Status: COMPLETED | OUTPATIENT
Start: 2020-01-01 | End: 2020-01-01

## 2020-01-01 RX ORDER — SODIUM CHLORIDE 9 MG/ML
500 INJECTION, SOLUTION INTRAVENOUS ONCE
Refills: 0 | Status: COMPLETED | OUTPATIENT
Start: 2020-01-01 | End: 2020-01-01

## 2020-01-01 RX ORDER — ACETAMINOPHEN 500 MG
650 TABLET ORAL ONCE
Refills: 0 | Status: COMPLETED | OUTPATIENT
Start: 2020-01-01 | End: 2020-01-01

## 2020-01-01 RX ORDER — QUETIAPINE FUMARATE 200 MG/1
50 TABLET, FILM COATED ORAL AT BEDTIME
Refills: 0 | Status: DISCONTINUED | OUTPATIENT
Start: 2020-01-01 | End: 2020-11-29

## 2020-01-01 RX ORDER — REMDESIVIR 5 MG/ML
INJECTION INTRAVENOUS
Refills: 0 | Status: DISCONTINUED | OUTPATIENT
Start: 2020-01-01 | End: 2020-01-01

## 2020-01-01 RX ORDER — HEPARIN SODIUM 5000 [USP'U]/ML
1200 INJECTION INTRAVENOUS; SUBCUTANEOUS
Qty: 25000 | Refills: 0 | Status: DISCONTINUED | OUTPATIENT
Start: 2020-01-01 | End: 2020-01-01

## 2020-01-01 RX ORDER — DILTIAZEM HCL 120 MG
30 CAPSULE, EXT RELEASE 24 HR ORAL
Refills: 0 | Status: DISCONTINUED | OUTPATIENT
Start: 2020-01-01 | End: 2020-01-01

## 2020-01-01 RX ORDER — INFLUENZA VIRUS VACCINE 15; 15; 15; 15 UG/.5ML; UG/.5ML; UG/.5ML; UG/.5ML
0.5 SUSPENSION INTRAMUSCULAR ONCE
Refills: 0 | Status: DISCONTINUED | OUTPATIENT
Start: 2020-01-01 | End: 2020-11-29

## 2020-01-01 RX ORDER — ZOLPIDEM TARTRATE 10 MG/1
5 TABLET ORAL AT BEDTIME
Refills: 0 | Status: DISCONTINUED | OUTPATIENT
Start: 2020-01-01 | End: 2020-01-01

## 2020-01-01 RX ORDER — HEPARIN SODIUM 5000 [USP'U]/ML
900 INJECTION INTRAVENOUS; SUBCUTANEOUS
Qty: 25000 | Refills: 0 | Status: DISCONTINUED | OUTPATIENT
Start: 2020-01-01 | End: 2020-11-29

## 2020-01-01 RX ORDER — CEFAZOLIN SODIUM 1 G
VIAL (EA) INJECTION
Refills: 0 | Status: DISCONTINUED | OUTPATIENT
Start: 2020-01-01 | End: 2020-01-01

## 2020-01-01 RX ORDER — CASPOFUNGIN ACETATE 7 MG/ML
50 INJECTION, POWDER, LYOPHILIZED, FOR SOLUTION INTRAVENOUS EVERY 24 HOURS
Refills: 0 | Status: DISCONTINUED | OUTPATIENT
Start: 2020-01-01 | End: 2020-01-01

## 2020-01-01 RX ORDER — ENOXAPARIN SODIUM 100 MG/ML
80 INJECTION SUBCUTANEOUS ONCE
Refills: 0 | Status: COMPLETED | OUTPATIENT
Start: 2020-01-01 | End: 2020-01-01

## 2020-01-01 RX ORDER — DEXAMETHASONE 0.5 MG/5ML
6 ELIXIR ORAL DAILY
Refills: 0 | Status: COMPLETED | OUTPATIENT
Start: 2020-01-01 | End: 2020-01-01

## 2020-01-01 RX ADMIN — Medication 12.5 MILLIGRAM(S): at 17:42

## 2020-01-01 RX ADMIN — CASPOFUNGIN ACETATE 260 MILLIGRAM(S): 7 INJECTION, POWDER, LYOPHILIZED, FOR SOLUTION INTRAVENOUS at 12:37

## 2020-01-01 RX ADMIN — Medication 650 MILLIGRAM(S): at 15:54

## 2020-01-01 RX ADMIN — ATORVASTATIN CALCIUM 80 MILLIGRAM(S): 80 TABLET, FILM COATED ORAL at 21:53

## 2020-01-01 RX ADMIN — INSULIN HUMAN 10 UNIT(S): 100 INJECTION, SOLUTION SUBCUTANEOUS at 10:52

## 2020-01-01 RX ADMIN — QUETIAPINE FUMARATE 25 MILLIGRAM(S): 200 TABLET, FILM COATED ORAL at 21:18

## 2020-01-01 RX ADMIN — LIDOCAINE 1 PATCH: 4 CREAM TOPICAL at 11:46

## 2020-01-01 RX ADMIN — Medication 12.5 MILLIGRAM(S): at 18:58

## 2020-01-01 RX ADMIN — QUETIAPINE FUMARATE 25 MILLIGRAM(S): 200 TABLET, FILM COATED ORAL at 22:42

## 2020-01-01 RX ADMIN — Medication 40 MILLIGRAM(S): at 06:19

## 2020-01-01 RX ADMIN — MORPHINE SULFATE 4 MILLIGRAM(S): 50 CAPSULE, EXTENDED RELEASE ORAL at 21:13

## 2020-01-01 RX ADMIN — LISINOPRIL 10 MILLIGRAM(S): 2.5 TABLET ORAL at 05:31

## 2020-01-01 RX ADMIN — Medication 1 APPLICATION(S): at 17:11

## 2020-01-01 RX ADMIN — OXYCODONE AND ACETAMINOPHEN 1 TABLET(S): 5; 325 TABLET ORAL at 13:55

## 2020-01-01 RX ADMIN — Medication 81 MILLIGRAM(S): at 12:11

## 2020-01-01 RX ADMIN — PANTOPRAZOLE SODIUM 40 MILLIGRAM(S): 20 TABLET, DELAYED RELEASE ORAL at 08:43

## 2020-01-01 RX ADMIN — MORPHINE SULFATE 4 MILLIGRAM(S): 50 CAPSULE, EXTENDED RELEASE ORAL at 19:56

## 2020-01-01 RX ADMIN — Medication 1 APPLICATION(S): at 11:29

## 2020-01-01 RX ADMIN — NAFCILLIN 200 GRAM(S): 10 INJECTION, POWDER, FOR SOLUTION INTRAVENOUS at 06:45

## 2020-01-01 RX ADMIN — SODIUM CHLORIDE 60 MILLILITER(S): 9 INJECTION INTRAMUSCULAR; INTRAVENOUS; SUBCUTANEOUS at 21:17

## 2020-01-01 RX ADMIN — QUETIAPINE FUMARATE 25 MILLIGRAM(S): 200 TABLET, FILM COATED ORAL at 21:43

## 2020-01-01 RX ADMIN — Medication 1 APPLICATION(S): at 06:00

## 2020-01-01 RX ADMIN — Medication 1 APPLICATION(S): at 17:30

## 2020-01-01 RX ADMIN — OXYCODONE AND ACETAMINOPHEN 1 TABLET(S): 5; 325 TABLET ORAL at 09:30

## 2020-01-01 RX ADMIN — Medication 12.5 MILLIGRAM(S): at 05:30

## 2020-01-01 RX ADMIN — HEPARIN SODIUM 10 UNIT(S)/HR: 5000 INJECTION INTRAVENOUS; SUBCUTANEOUS at 10:01

## 2020-01-01 RX ADMIN — QUETIAPINE FUMARATE 50 MILLIGRAM(S): 200 TABLET, FILM COATED ORAL at 21:35

## 2020-01-01 RX ADMIN — PANTOPRAZOLE SODIUM 40 MILLIGRAM(S): 20 TABLET, DELAYED RELEASE ORAL at 06:46

## 2020-01-01 RX ADMIN — LIDOCAINE 1 PATCH: 4 CREAM TOPICAL at 22:03

## 2020-01-01 RX ADMIN — Medication 6 MILLIGRAM(S): at 05:14

## 2020-01-01 RX ADMIN — QUETIAPINE FUMARATE 25 MILLIGRAM(S): 200 TABLET, FILM COATED ORAL at 21:08

## 2020-01-01 RX ADMIN — MORPHINE SULFATE 2 MILLIGRAM(S): 50 CAPSULE, EXTENDED RELEASE ORAL at 09:27

## 2020-01-01 RX ADMIN — MEROPENEM 100 MILLIGRAM(S): 1 INJECTION INTRAVENOUS at 13:47

## 2020-01-01 RX ADMIN — Medication 1 APPLICATION(S): at 05:42

## 2020-01-01 RX ADMIN — Medication 12.5 MILLIGRAM(S): at 17:07

## 2020-01-01 RX ADMIN — MEROPENEM 100 MILLIGRAM(S): 1 INJECTION INTRAVENOUS at 05:47

## 2020-01-01 RX ADMIN — NAFCILLIN 200 GRAM(S): 10 INJECTION, POWDER, FOR SOLUTION INTRAVENOUS at 09:12

## 2020-01-01 RX ADMIN — Medication 81 MILLIGRAM(S): at 13:05

## 2020-01-01 RX ADMIN — Medication 1 APPLICATION(S): at 22:28

## 2020-01-01 RX ADMIN — MORPHINE SULFATE 2 MILLIGRAM(S): 50 CAPSULE, EXTENDED RELEASE ORAL at 17:38

## 2020-01-01 RX ADMIN — NAFCILLIN 200 GRAM(S): 10 INJECTION, POWDER, FOR SOLUTION INTRAVENOUS at 02:51

## 2020-01-01 RX ADMIN — HEPARIN SODIUM 1600 UNIT(S)/HR: 5000 INJECTION INTRAVENOUS; SUBCUTANEOUS at 14:14

## 2020-01-01 RX ADMIN — LISINOPRIL 20 MILLIGRAM(S): 2.5 TABLET ORAL at 05:48

## 2020-01-01 RX ADMIN — OXYCODONE AND ACETAMINOPHEN 1 TABLET(S): 5; 325 TABLET ORAL at 01:43

## 2020-01-01 RX ADMIN — Medication 12.5 MILLIGRAM(S): at 05:48

## 2020-01-01 RX ADMIN — Medication 1 APPLICATION(S): at 23:49

## 2020-01-01 RX ADMIN — LIDOCAINE 1 PATCH: 4 CREAM TOPICAL at 07:48

## 2020-01-01 RX ADMIN — ENOXAPARIN SODIUM 80 MILLIGRAM(S): 100 INJECTION SUBCUTANEOUS at 14:16

## 2020-01-01 RX ADMIN — Medication 81 MILLIGRAM(S): at 11:06

## 2020-01-01 RX ADMIN — Medication 1 APPLICATION(S): at 17:33

## 2020-01-01 RX ADMIN — Medication 40 MILLIGRAM(S): at 06:07

## 2020-01-01 RX ADMIN — ATORVASTATIN CALCIUM 80 MILLIGRAM(S): 80 TABLET, FILM COATED ORAL at 21:59

## 2020-01-01 RX ADMIN — Medication 40 MILLIGRAM(S): at 05:47

## 2020-01-01 RX ADMIN — SODIUM CHLORIDE 250 MILLILITER(S): 9 INJECTION INTRAMUSCULAR; INTRAVENOUS; SUBCUTANEOUS at 11:49

## 2020-01-01 RX ADMIN — MEROPENEM 100 MILLIGRAM(S): 1 INJECTION INTRAVENOUS at 21:28

## 2020-01-01 RX ADMIN — Medication 12.5 MILLIGRAM(S): at 06:10

## 2020-01-01 RX ADMIN — QUETIAPINE FUMARATE 50 MILLIGRAM(S): 200 TABLET, FILM COATED ORAL at 21:18

## 2020-01-01 RX ADMIN — Medication 40 MILLIGRAM(S): at 07:04

## 2020-01-01 RX ADMIN — SODIUM CHLORIDE 50 MILLILITER(S): 9 INJECTION INTRAMUSCULAR; INTRAVENOUS; SUBCUTANEOUS at 11:31

## 2020-01-01 RX ADMIN — Medication 1 APPLICATION(S): at 12:49

## 2020-01-01 RX ADMIN — MORPHINE SULFATE 2 MILLIGRAM(S): 50 CAPSULE, EXTENDED RELEASE ORAL at 13:35

## 2020-01-01 RX ADMIN — MEROPENEM 100 MILLIGRAM(S): 1 INJECTION INTRAVENOUS at 13:00

## 2020-01-01 RX ADMIN — ZOLPIDEM TARTRATE 5 MILLIGRAM(S): 10 TABLET ORAL at 21:35

## 2020-01-01 RX ADMIN — QUETIAPINE FUMARATE 25 MILLIGRAM(S): 200 TABLET, FILM COATED ORAL at 21:50

## 2020-01-01 RX ADMIN — PANTOPRAZOLE SODIUM 40 MILLIGRAM(S): 20 TABLET, DELAYED RELEASE ORAL at 13:06

## 2020-01-01 RX ADMIN — LISINOPRIL 20 MILLIGRAM(S): 2.5 TABLET ORAL at 07:03

## 2020-01-01 RX ADMIN — SIMETHICONE 80 MILLIGRAM(S): 80 TABLET, CHEWABLE ORAL at 16:12

## 2020-01-01 RX ADMIN — Medication 50 MILLILITER(S): at 10:52

## 2020-01-01 RX ADMIN — LISINOPRIL 10 MILLIGRAM(S): 2.5 TABLET ORAL at 09:55

## 2020-01-01 RX ADMIN — QUETIAPINE FUMARATE 25 MILLIGRAM(S): 200 TABLET, FILM COATED ORAL at 21:59

## 2020-01-01 RX ADMIN — SIMETHICONE 80 MILLIGRAM(S): 80 TABLET, CHEWABLE ORAL at 23:26

## 2020-01-01 RX ADMIN — Medication 40 MILLIGRAM(S): at 08:44

## 2020-01-01 RX ADMIN — Medication 81 MILLIGRAM(S): at 12:29

## 2020-01-01 RX ADMIN — Medication 6 MILLIGRAM(S): at 05:31

## 2020-01-01 RX ADMIN — PANTOPRAZOLE SODIUM 40 MILLIGRAM(S): 20 TABLET, DELAYED RELEASE ORAL at 09:04

## 2020-01-01 RX ADMIN — ATORVASTATIN CALCIUM 80 MILLIGRAM(S): 80 TABLET, FILM COATED ORAL at 22:16

## 2020-01-01 RX ADMIN — NAFCILLIN 200 GRAM(S): 10 INJECTION, POWDER, FOR SOLUTION INTRAVENOUS at 05:11

## 2020-01-01 RX ADMIN — Medication 81 MILLIGRAM(S): at 11:45

## 2020-01-01 RX ADMIN — Medication 5 MILLIGRAM(S): at 22:29

## 2020-01-01 RX ADMIN — Medication 1 APPLICATION(S): at 11:32

## 2020-01-01 RX ADMIN — MEROPENEM 100 MILLIGRAM(S): 1 INJECTION INTRAVENOUS at 05:33

## 2020-01-01 RX ADMIN — NAFCILLIN 200 GRAM(S): 10 INJECTION, POWDER, FOR SOLUTION INTRAVENOUS at 21:50

## 2020-01-01 RX ADMIN — Medication 81 MILLIGRAM(S): at 11:18

## 2020-01-01 RX ADMIN — MEROPENEM 100 MILLIGRAM(S): 1 INJECTION INTRAVENOUS at 22:48

## 2020-01-01 RX ADMIN — Medication 81 MILLIGRAM(S): at 12:02

## 2020-01-01 RX ADMIN — Medication 8.3 MICROGRAM(S)/KG/MIN: at 18:12

## 2020-01-01 RX ADMIN — ZOLPIDEM TARTRATE 5 MILLIGRAM(S): 10 TABLET ORAL at 21:33

## 2020-01-01 RX ADMIN — Medication 0.5 MILLIGRAM(S): at 17:48

## 2020-01-01 RX ADMIN — Medication 6 MILLIGRAM(S): at 05:11

## 2020-01-01 RX ADMIN — Medication 12.5 MILLIGRAM(S): at 07:27

## 2020-01-01 RX ADMIN — Medication 12.5 MILLIGRAM(S): at 18:10

## 2020-01-01 RX ADMIN — MORPHINE SULFATE 1 MILLIGRAM(S): 50 CAPSULE, EXTENDED RELEASE ORAL at 12:47

## 2020-01-01 RX ADMIN — Medication 5 MILLIGRAM(S): at 11:32

## 2020-01-01 RX ADMIN — MORPHINE SULFATE 4 MILLIGRAM(S): 50 CAPSULE, EXTENDED RELEASE ORAL at 22:32

## 2020-01-01 RX ADMIN — MORPHINE SULFATE 1 MILLIGRAM(S): 50 CAPSULE, EXTENDED RELEASE ORAL at 01:49

## 2020-01-01 RX ADMIN — PANTOPRAZOLE SODIUM 40 MILLIGRAM(S): 20 TABLET, DELAYED RELEASE ORAL at 07:27

## 2020-01-01 RX ADMIN — NAFCILLIN 200 GRAM(S): 10 INJECTION, POWDER, FOR SOLUTION INTRAVENOUS at 01:48

## 2020-01-01 RX ADMIN — Medication 5 MILLIGRAM(S): at 17:11

## 2020-01-01 RX ADMIN — Medication 6 MILLIGRAM(S): at 05:47

## 2020-01-01 RX ADMIN — QUETIAPINE FUMARATE 50 MILLIGRAM(S): 200 TABLET, FILM COATED ORAL at 21:13

## 2020-01-01 RX ADMIN — MORPHINE SULFATE 4 MILLIGRAM(S): 50 CAPSULE, EXTENDED RELEASE ORAL at 20:30

## 2020-01-01 RX ADMIN — LIDOCAINE 1 PATCH: 4 CREAM TOPICAL at 19:36

## 2020-01-01 RX ADMIN — Medication 0.5 MILLIGRAM(S): at 09:21

## 2020-01-01 RX ADMIN — OXYCODONE AND ACETAMINOPHEN 1 TABLET(S): 5; 325 TABLET ORAL at 14:30

## 2020-01-01 RX ADMIN — Medication 40 MILLIGRAM(S): at 18:10

## 2020-01-01 RX ADMIN — Medication 12.5 MILLIGRAM(S): at 08:43

## 2020-01-01 RX ADMIN — Medication 12.5 MILLIGRAM(S): at 06:19

## 2020-01-01 RX ADMIN — MORPHINE SULFATE 1 MILLIGRAM(S): 50 CAPSULE, EXTENDED RELEASE ORAL at 10:29

## 2020-01-01 RX ADMIN — Medication 1 APPLICATION(S): at 00:35

## 2020-01-01 RX ADMIN — NAFCILLIN 200 GRAM(S): 10 INJECTION, POWDER, FOR SOLUTION INTRAVENOUS at 06:38

## 2020-01-01 RX ADMIN — LIDOCAINE 1 PATCH: 4 CREAM TOPICAL at 13:05

## 2020-01-01 RX ADMIN — Medication 12.5 MILLIGRAM(S): at 17:12

## 2020-01-01 RX ADMIN — NAFCILLIN 200 GRAM(S): 10 INJECTION, POWDER, FOR SOLUTION INTRAVENOUS at 09:28

## 2020-01-01 RX ADMIN — MORPHINE SULFATE 1 MILLIGRAM(S): 50 CAPSULE, EXTENDED RELEASE ORAL at 21:27

## 2020-01-01 RX ADMIN — Medication 6 MILLIGRAM(S): at 14:16

## 2020-01-01 RX ADMIN — ATORVASTATIN CALCIUM 80 MILLIGRAM(S): 80 TABLET, FILM COATED ORAL at 21:16

## 2020-01-01 RX ADMIN — Medication 40 MILLIGRAM(S): at 17:44

## 2020-01-01 RX ADMIN — Medication 40 MILLIGRAM(S): at 18:57

## 2020-01-01 RX ADMIN — Medication 12.5 MILLIGRAM(S): at 17:32

## 2020-01-01 RX ADMIN — NAFCILLIN 200 GRAM(S): 10 INJECTION, POWDER, FOR SOLUTION INTRAVENOUS at 13:48

## 2020-01-01 RX ADMIN — Medication 6 MILLIGRAM(S): at 07:07

## 2020-01-01 RX ADMIN — NAFCILLIN 200 GRAM(S): 10 INJECTION, POWDER, FOR SOLUTION INTRAVENOUS at 13:42

## 2020-01-01 RX ADMIN — Medication 30 MILLIGRAM(S): at 06:46

## 2020-01-01 RX ADMIN — PANTOPRAZOLE SODIUM 40 MILLIGRAM(S): 20 TABLET, DELAYED RELEASE ORAL at 05:22

## 2020-01-01 RX ADMIN — Medication 100 MILLIGRAM(S): at 21:18

## 2020-01-01 RX ADMIN — Medication 40 MILLIGRAM(S): at 08:50

## 2020-01-01 RX ADMIN — Medication 81 MILLIGRAM(S): at 11:36

## 2020-01-01 RX ADMIN — ATORVASTATIN CALCIUM 80 MILLIGRAM(S): 80 TABLET, FILM COATED ORAL at 22:30

## 2020-01-01 RX ADMIN — CASPOFUNGIN ACETATE 260 MILLIGRAM(S): 7 INJECTION, POWDER, LYOPHILIZED, FOR SOLUTION INTRAVENOUS at 12:58

## 2020-01-01 RX ADMIN — TRAMADOL HYDROCHLORIDE 25 MILLIGRAM(S): 50 TABLET ORAL at 19:01

## 2020-01-01 RX ADMIN — Medication 40 MILLIGRAM(S): at 17:31

## 2020-01-01 RX ADMIN — ATORVASTATIN CALCIUM 80 MILLIGRAM(S): 80 TABLET, FILM COATED ORAL at 21:13

## 2020-01-01 RX ADMIN — PANTOPRAZOLE SODIUM 40 MILLIGRAM(S): 20 TABLET, DELAYED RELEASE ORAL at 06:38

## 2020-01-01 RX ADMIN — TRAMADOL HYDROCHLORIDE 25 MILLIGRAM(S): 50 TABLET ORAL at 20:30

## 2020-01-01 RX ADMIN — NAFCILLIN 200 GRAM(S): 10 INJECTION, POWDER, FOR SOLUTION INTRAVENOUS at 23:26

## 2020-01-01 RX ADMIN — ATORVASTATIN CALCIUM 80 MILLIGRAM(S): 80 TABLET, FILM COATED ORAL at 21:24

## 2020-01-01 RX ADMIN — TRAMADOL HYDROCHLORIDE 25 MILLIGRAM(S): 50 TABLET ORAL at 20:07

## 2020-01-01 RX ADMIN — MORPHINE SULFATE 1 MILLIGRAM(S): 50 CAPSULE, EXTENDED RELEASE ORAL at 08:30

## 2020-01-01 RX ADMIN — QUETIAPINE FUMARATE 25 MILLIGRAM(S): 200 TABLET, FILM COATED ORAL at 21:13

## 2020-01-01 RX ADMIN — NAFCILLIN 200 GRAM(S): 10 INJECTION, POWDER, FOR SOLUTION INTRAVENOUS at 02:23

## 2020-01-01 RX ADMIN — Medication 5 MILLIGRAM(S): at 18:29

## 2020-01-01 RX ADMIN — PANTOPRAZOLE SODIUM 40 MILLIGRAM(S): 20 TABLET, DELAYED RELEASE ORAL at 07:03

## 2020-01-01 RX ADMIN — ATORVASTATIN CALCIUM 80 MILLIGRAM(S): 80 TABLET, FILM COATED ORAL at 21:08

## 2020-01-01 RX ADMIN — Medication 12.5 MILLIGRAM(S): at 17:13

## 2020-01-01 RX ADMIN — Medication 40 MILLIGRAM(S): at 17:06

## 2020-01-01 RX ADMIN — MORPHINE SULFATE 1 MILLIGRAM(S): 50 CAPSULE, EXTENDED RELEASE ORAL at 09:59

## 2020-01-01 RX ADMIN — Medication 1 APPLICATION(S): at 11:47

## 2020-01-01 RX ADMIN — QUETIAPINE FUMARATE 25 MILLIGRAM(S): 200 TABLET, FILM COATED ORAL at 22:30

## 2020-01-01 RX ADMIN — Medication 40 MILLIGRAM(S): at 17:13

## 2020-01-01 RX ADMIN — LISINOPRIL 20 MILLIGRAM(S): 2.5 TABLET ORAL at 05:30

## 2020-01-01 RX ADMIN — Medication 6 MILLIGRAM(S): at 06:46

## 2020-01-01 RX ADMIN — QUETIAPINE FUMARATE 25 MILLIGRAM(S): 200 TABLET, FILM COATED ORAL at 22:32

## 2020-01-01 RX ADMIN — Medication 5 MILLIGRAM(S): at 05:21

## 2020-01-01 RX ADMIN — Medication 30 MILLIGRAM(S): at 01:37

## 2020-01-01 RX ADMIN — Medication 6 MILLIGRAM(S): at 05:21

## 2020-01-01 RX ADMIN — QUETIAPINE FUMARATE 25 MILLIGRAM(S): 200 TABLET, FILM COATED ORAL at 21:16

## 2020-01-01 RX ADMIN — Medication 81 MILLIGRAM(S): at 13:06

## 2020-01-01 RX ADMIN — MORPHINE SULFATE 2 MILLIGRAM(S): 50 CAPSULE, EXTENDED RELEASE ORAL at 10:45

## 2020-01-01 RX ADMIN — Medication 1 APPLICATION(S): at 12:03

## 2020-01-01 RX ADMIN — Medication 1 APPLICATION(S): at 17:08

## 2020-01-01 RX ADMIN — MORPHINE SULFATE 1 MILLIGRAM(S): 50 CAPSULE, EXTENDED RELEASE ORAL at 09:49

## 2020-01-01 RX ADMIN — Medication 12.5 MILLIGRAM(S): at 17:06

## 2020-01-01 RX ADMIN — MORPHINE SULFATE 1 MILLIGRAM(S): 50 CAPSULE, EXTENDED RELEASE ORAL at 14:23

## 2020-01-01 RX ADMIN — LISINOPRIL 20 MILLIGRAM(S): 2.5 TABLET ORAL at 06:19

## 2020-01-01 RX ADMIN — AMIODARONE HYDROCHLORIDE 600 MILLIGRAM(S): 400 TABLET ORAL at 14:09

## 2020-01-01 RX ADMIN — MORPHINE SULFATE 1 MILLIGRAM(S): 50 CAPSULE, EXTENDED RELEASE ORAL at 14:15

## 2020-01-01 RX ADMIN — Medication 6 MILLIGRAM(S): at 06:38

## 2020-01-01 RX ADMIN — PANTOPRAZOLE SODIUM 40 MILLIGRAM(S): 20 TABLET, DELAYED RELEASE ORAL at 05:32

## 2020-01-01 RX ADMIN — Medication 100 MILLIGRAM(S): at 13:13

## 2020-01-01 RX ADMIN — Medication 1 APPLICATION(S): at 18:58

## 2020-01-01 RX ADMIN — MORPHINE SULFATE 1 MILLIGRAM(S): 50 CAPSULE, EXTENDED RELEASE ORAL at 00:08

## 2020-01-01 RX ADMIN — LIDOCAINE 1 PATCH: 4 CREAM TOPICAL at 21:21

## 2020-01-01 RX ADMIN — Medication 5 MILLIGRAM(S): at 22:09

## 2020-01-01 RX ADMIN — LIDOCAINE 1 PATCH: 4 CREAM TOPICAL at 23:49

## 2020-01-01 RX ADMIN — LIDOCAINE 1 PATCH: 4 CREAM TOPICAL at 19:49

## 2020-01-01 RX ADMIN — OXYCODONE AND ACETAMINOPHEN 1 TABLET(S): 5; 325 TABLET ORAL at 22:04

## 2020-01-01 RX ADMIN — MEROPENEM 100 MILLIGRAM(S): 1 INJECTION INTRAVENOUS at 13:23

## 2020-01-01 RX ADMIN — LISINOPRIL 10 MILLIGRAM(S): 2.5 TABLET ORAL at 08:43

## 2020-01-01 RX ADMIN — Medication 40 MILLIGRAM(S): at 17:07

## 2020-01-01 RX ADMIN — Medication 1 APPLICATION(S): at 06:38

## 2020-01-01 RX ADMIN — Medication 40 MILLIGRAM(S): at 17:12

## 2020-01-01 RX ADMIN — MORPHINE SULFATE 2 MILLIGRAM(S): 50 CAPSULE, EXTENDED RELEASE ORAL at 18:20

## 2020-01-01 RX ADMIN — ATORVASTATIN CALCIUM 80 MILLIGRAM(S): 80 TABLET, FILM COATED ORAL at 21:18

## 2020-01-01 RX ADMIN — LIDOCAINE 1 PATCH: 4 CREAM TOPICAL at 11:30

## 2020-01-01 RX ADMIN — Medication 40 MILLIGRAM(S): at 05:31

## 2020-01-01 RX ADMIN — PANTOPRAZOLE SODIUM 40 MILLIGRAM(S): 20 TABLET, DELAYED RELEASE ORAL at 05:11

## 2020-01-01 RX ADMIN — Medication 40 MILLIGRAM(S): at 05:32

## 2020-01-01 RX ADMIN — NAFCILLIN 200 GRAM(S): 10 INJECTION, POWDER, FOR SOLUTION INTRAVENOUS at 19:57

## 2020-01-01 RX ADMIN — LIDOCAINE 1 PATCH: 4 CREAM TOPICAL at 12:02

## 2020-01-01 RX ADMIN — Medication 6 MILLIGRAM(S): at 06:36

## 2020-01-01 RX ADMIN — OXYCODONE AND ACETAMINOPHEN 1 TABLET(S): 5; 325 TABLET ORAL at 10:43

## 2020-01-01 RX ADMIN — ENOXAPARIN SODIUM 80 MILLIGRAM(S): 100 INJECTION SUBCUTANEOUS at 05:14

## 2020-01-01 RX ADMIN — Medication 12.5 MILLIGRAM(S): at 05:33

## 2020-01-01 RX ADMIN — Medication 6 MILLIGRAM(S): at 06:19

## 2020-01-01 RX ADMIN — SODIUM CHLORIDE 1000 MILLILITER(S): 9 INJECTION, SOLUTION INTRAVENOUS at 09:18

## 2020-01-01 RX ADMIN — Medication 6 MILLIGRAM(S): at 05:28

## 2020-01-01 RX ADMIN — HEPARIN SODIUM 9 UNIT(S)/HR: 5000 INJECTION INTRAVENOUS; SUBCUTANEOUS at 11:30

## 2020-01-01 RX ADMIN — LIDOCAINE 1 PATCH: 4 CREAM TOPICAL at 11:28

## 2020-01-01 RX ADMIN — NAFCILLIN 200 GRAM(S): 10 INJECTION, POWDER, FOR SOLUTION INTRAVENOUS at 14:10

## 2020-01-01 RX ADMIN — Medication 5 MILLIGRAM(S): at 12:37

## 2020-01-01 RX ADMIN — MORPHINE SULFATE 1 MILLIGRAM(S): 50 CAPSULE, EXTENDED RELEASE ORAL at 08:20

## 2020-01-01 RX ADMIN — PANTOPRAZOLE SODIUM 40 MILLIGRAM(S): 20 TABLET, DELAYED RELEASE ORAL at 06:34

## 2020-01-01 RX ADMIN — ONDANSETRON 4 MILLIGRAM(S): 8 TABLET, FILM COATED ORAL at 21:12

## 2020-01-01 RX ADMIN — Medication 100 MILLIGRAM(S): at 15:00

## 2020-01-01 RX ADMIN — ATORVASTATIN CALCIUM 80 MILLIGRAM(S): 80 TABLET, FILM COATED ORAL at 21:35

## 2020-01-01 RX ADMIN — Medication 6 MILLIGRAM(S): at 06:10

## 2020-01-01 RX ADMIN — ATORVASTATIN CALCIUM 80 MILLIGRAM(S): 80 TABLET, FILM COATED ORAL at 21:50

## 2020-01-01 RX ADMIN — MORPHINE SULFATE 1 MILLIGRAM(S): 50 CAPSULE, EXTENDED RELEASE ORAL at 11:47

## 2020-01-01 RX ADMIN — ENOXAPARIN SODIUM 80 MILLIGRAM(S): 100 INJECTION SUBCUTANEOUS at 20:39

## 2020-01-01 RX ADMIN — Medication 6 MILLIGRAM(S): at 07:25

## 2020-01-01 RX ADMIN — Medication 200 GRAM(S): at 16:54

## 2020-01-01 RX ADMIN — NAFCILLIN 200 GRAM(S): 10 INJECTION, POWDER, FOR SOLUTION INTRAVENOUS at 14:24

## 2020-01-01 RX ADMIN — Medication 12.5 MILLIGRAM(S): at 17:11

## 2020-01-01 RX ADMIN — LISINOPRIL 10 MILLIGRAM(S): 2.5 TABLET ORAL at 14:09

## 2020-01-01 RX ADMIN — Medication 40 MILLIGRAM(S): at 17:42

## 2020-01-01 RX ADMIN — Medication 40 MILLIGRAM(S): at 06:00

## 2020-01-01 RX ADMIN — LISINOPRIL 20 MILLIGRAM(S): 2.5 TABLET ORAL at 07:27

## 2020-01-01 RX ADMIN — NAFCILLIN 200 GRAM(S): 10 INJECTION, POWDER, FOR SOLUTION INTRAVENOUS at 10:24

## 2020-01-01 RX ADMIN — NAFCILLIN 200 GRAM(S): 10 INJECTION, POWDER, FOR SOLUTION INTRAVENOUS at 22:42

## 2020-01-01 RX ADMIN — NAFCILLIN 200 GRAM(S): 10 INJECTION, POWDER, FOR SOLUTION INTRAVENOUS at 17:08

## 2020-01-01 RX ADMIN — Medication 12.5 MILLIGRAM(S): at 07:03

## 2020-01-01 RX ADMIN — Medication 6 MILLIGRAM(S): at 07:03

## 2020-01-01 RX ADMIN — PANTOPRAZOLE SODIUM 40 MILLIGRAM(S): 20 TABLET, DELAYED RELEASE ORAL at 06:19

## 2020-01-01 RX ADMIN — REMDESIVIR 500 MILLIGRAM(S): 5 INJECTION INTRAVENOUS at 22:03

## 2020-01-01 RX ADMIN — MORPHINE SULFATE 1 MILLIGRAM(S): 50 CAPSULE, EXTENDED RELEASE ORAL at 04:11

## 2020-01-01 RX ADMIN — MORPHINE SULFATE 1 MILLIGRAM(S): 50 CAPSULE, EXTENDED RELEASE ORAL at 09:55

## 2020-01-01 RX ADMIN — Medication 1 MILLIGRAM(S): at 11:06

## 2020-01-01 RX ADMIN — ATORVASTATIN CALCIUM 80 MILLIGRAM(S): 80 TABLET, FILM COATED ORAL at 22:32

## 2020-01-01 RX ADMIN — NAFCILLIN 200 GRAM(S): 10 INJECTION, POWDER, FOR SOLUTION INTRAVENOUS at 17:31

## 2020-01-01 RX ADMIN — Medication 30 MILLILITER(S): at 10:13

## 2020-01-01 RX ADMIN — Medication 6 MILLIGRAM(S): at 17:32

## 2020-01-01 RX ADMIN — Medication 5 MILLIGRAM(S): at 22:49

## 2020-01-01 RX ADMIN — Medication 12.5 MILLIGRAM(S): at 06:06

## 2020-01-01 RX ADMIN — ATORVASTATIN CALCIUM 80 MILLIGRAM(S): 80 TABLET, FILM COATED ORAL at 22:41

## 2020-01-01 RX ADMIN — LIDOCAINE 1 PATCH: 4 CREAM TOPICAL at 12:38

## 2020-01-01 RX ADMIN — TRAMADOL HYDROCHLORIDE 25 MILLIGRAM(S): 50 TABLET ORAL at 18:11

## 2020-01-01 RX ADMIN — PANTOPRAZOLE SODIUM 40 MILLIGRAM(S): 20 TABLET, DELAYED RELEASE ORAL at 05:33

## 2020-01-01 RX ADMIN — MORPHINE SULFATE 2 MILLIGRAM(S): 50 CAPSULE, EXTENDED RELEASE ORAL at 10:18

## 2020-01-01 RX ADMIN — Medication 5 MILLIGRAM(S): at 05:48

## 2020-01-01 RX ADMIN — MORPHINE SULFATE 2 MILLIGRAM(S): 50 CAPSULE, EXTENDED RELEASE ORAL at 00:03

## 2020-01-01 RX ADMIN — QUETIAPINE FUMARATE 25 MILLIGRAM(S): 200 TABLET, FILM COATED ORAL at 21:24

## 2020-01-01 RX ADMIN — MORPHINE SULFATE 1 MILLIGRAM(S): 50 CAPSULE, EXTENDED RELEASE ORAL at 14:53

## 2020-01-01 RX ADMIN — Medication 81 MILLIGRAM(S): at 11:24

## 2020-01-01 RX ADMIN — Medication 1 APPLICATION(S): at 22:49

## 2020-01-01 RX ADMIN — Medication 5 MILLIGRAM(S): at 18:05

## 2020-01-01 RX ADMIN — LISINOPRIL 20 MILLIGRAM(S): 2.5 TABLET ORAL at 11:36

## 2020-01-01 RX ADMIN — MORPHINE SULFATE 1 MILLIGRAM(S): 50 CAPSULE, EXTENDED RELEASE ORAL at 23:12

## 2020-01-01 RX ADMIN — Medication 100 MILLIGRAM(S): at 05:21

## 2020-01-01 RX ADMIN — PANTOPRAZOLE SODIUM 40 MILLIGRAM(S): 20 TABLET, DELAYED RELEASE ORAL at 06:10

## 2020-01-01 RX ADMIN — Medication 30 MILLILITER(S): at 06:25

## 2020-01-01 RX ADMIN — ATORVASTATIN CALCIUM 80 MILLIGRAM(S): 80 TABLET, FILM COATED ORAL at 21:06

## 2020-01-01 RX ADMIN — LIDOCAINE 1 PATCH: 4 CREAM TOPICAL at 11:09

## 2020-01-01 RX ADMIN — ATORVASTATIN CALCIUM 80 MILLIGRAM(S): 80 TABLET, FILM COATED ORAL at 21:34

## 2020-01-01 RX ADMIN — TOCILIZUMAB 100 MILLIGRAM(S): 20 INJECTION, SOLUTION, CONCENTRATE INTRAVENOUS at 18:06

## 2020-01-01 RX ADMIN — Medication 0.5 MILLIGRAM(S): at 22:09

## 2020-01-01 RX ADMIN — Medication 1 APPLICATION(S): at 11:10

## 2020-01-01 RX ADMIN — OXYCODONE AND ACETAMINOPHEN 1 TABLET(S): 5; 325 TABLET ORAL at 21:34

## 2020-01-01 RX ADMIN — QUETIAPINE FUMARATE 25 MILLIGRAM(S): 200 TABLET, FILM COATED ORAL at 21:06

## 2020-01-01 RX ADMIN — Medication 81 MILLIGRAM(S): at 11:40

## 2020-01-01 RX ADMIN — Medication 81 MILLIGRAM(S): at 11:10

## 2020-01-01 RX ADMIN — NAFCILLIN 200 GRAM(S): 10 INJECTION, POWDER, FOR SOLUTION INTRAVENOUS at 17:11

## 2020-01-01 RX ADMIN — Medication 40 MILLIGRAM(S): at 07:26

## 2020-01-01 RX ADMIN — Medication 650 MILLIGRAM(S): at 11:50

## 2020-01-01 RX ADMIN — QUETIAPINE FUMARATE 25 MILLIGRAM(S): 200 TABLET, FILM COATED ORAL at 21:35

## 2020-01-01 RX ADMIN — Medication 6 MILLIGRAM(S): at 06:06

## 2020-01-01 RX ADMIN — PANTOPRAZOLE SODIUM 40 MILLIGRAM(S): 20 TABLET, DELAYED RELEASE ORAL at 06:07

## 2020-01-01 RX ADMIN — OXYCODONE AND ACETAMINOPHEN 1 TABLET(S): 5; 325 TABLET ORAL at 09:59

## 2020-01-01 RX ADMIN — LIDOCAINE 1 PATCH: 4 CREAM TOPICAL at 12:10

## 2020-07-02 NOTE — REASON FOR VISIT
[Initial Evaluation] : an initial evaluation of [FreeTextEntry2] : cad [FreeTextEntry1] : 1995 patient had an mi\par followed by dr. lopez\par last nuclear exam was january 2017\par last echo was done approx 1/2017\par pt has a lbbb\par carotid studiews showed mild disease\par denies chest pain\par class 2 sob\par no h/o chf\par on appropriate medical therapy\par \par s/p stent placement in lower abdomen\par \par no new complaints since last visit\par stable.\par \par no new cardiac symptoms\par to see dr. fajardo for gi problem\par seeing dr. coronel for prostate\par \par since last visit, patient doing well\par offers no cardiac complaoints\par no chest pain, sob or chf

## 2020-07-02 NOTE — PHYSICAL EXAM
[General Appearance - Well Developed] : well developed [Normal Appearance] : normal appearance [Well Groomed] : well groomed [General Appearance - Well Nourished] : well nourished [No Deformities] : no deformities [General Appearance - In No Acute Distress] : no acute distress [Normal Conjunctiva] : the conjunctiva exhibited no abnormalities [Eyelids - No Xanthelasma] : the eyelids demonstrated no xanthelasmas [Normal Oral Mucosa] : normal oral mucosa [No Oral Pallor] : no oral pallor [No Oral Cyanosis] : no oral cyanosis [Respiration, Rhythm And Depth] : normal respiratory rhythm and effort [Exaggerated Use Of Accessory Muscles For Inspiration] : no accessory muscle use [Heart Rate And Rhythm] : heart rate and rhythm were normal [Auscultation Breath Sounds / Voice Sounds] : lungs were clear to auscultation bilaterally [Murmurs] : no murmurs present [Heart Sounds] : normal S1 and S2 [Abdomen Tenderness] : non-tender [Abdomen Soft] : soft [Abdomen Mass (___ Cm)] : no abdominal mass palpated [Abnormal Walk] : normal gait [Nail Clubbing] : no clubbing of the fingernails [Gait - Sufficient For Exercise Testing] : the gait was sufficient for exercise testing [Petechial Hemorrhages (___cm)] : no petechial hemorrhages [Cyanosis, Localized] : no localized cyanosis [Skin Color & Pigmentation] : normal skin color and pigmentation [No Venous Stasis] : no venous stasis [] : no rash [No Xanthoma] : no  xanthoma was observed [Skin Lesions] : no skin lesions [No Skin Ulcers] : no skin ulcer [Oriented To Time, Place, And Person] : oriented to person, place, and time [Affect] : the affect was normal [Mood] : the mood was normal [No Anxiety] : not feeling anxious

## 2020-08-18 NOTE — ED PROVIDER NOTE - NS ED ROS FT
Review of Systems    Constitutional: (-) fever/ chills   Eyes/ENT: (-) blurry vision, (-) epistaxis (-) sore throat (-) ear pain  Cardiovascular: (-) chest pain, (-) syncope   Respiratory: (-) cough, (-) shortness of breath  Gastrointestinal: (-) vomiting, (-) diarrhea (+)left flank abdominal pain  Musculoskeletal: (-) neck pain, (-) back pain   Integumentary: (-) rash, (-) swelling  Neurological: (-) headache, (-) altered mental status

## 2020-08-18 NOTE — ED PROVIDER NOTE - ATTENDING CONTRIBUTION TO CARE
ATTENDING NOTE: 74 y/o with PMHx AAA repair, kidney stones and diverticulosis presents with 1 day of left sided abdominal and flank pain, described as constant and without assoc sxs. Last U/S for AAA assessment was normal approx 1 year ago with Dr. Lopez. On exam: PT in NAD. Cardiac- S1S2. Lungs- CTAB. Abdomen (+) mild diffuse tenderness, soft ND, no CVAT. Extremities- femoral pulses intact, No LE edema. Neuro- grossly intact. Plan: obtain CT angio of abdomen and basic blood work.

## 2020-08-18 NOTE — ED PROVIDER NOTE - CARE PROVIDER_API CALL
Billy Ruth  GASTROENTEROLOGY  [address] [place] [address]  Phone: [phone]  Fax: [phone]  Follow Up Time: 1-3 Days    Bartolome Guerin  INTERNAL MEDICINE  [address] [place] [address]  Phone: [phone]  Fax: [phone]  Follow Up Time: 1-3 Days

## 2020-08-18 NOTE — ED PROVIDER NOTE - CARE PROVIDERS DIRECT ADDRESSES
,DirectAddress_Unknown,[email or web address]

## 2020-08-18 NOTE — ED PROVIDER NOTE - CLINICAL SUMMARY MEDICAL DECISION MAKING FREE TEXT BOX
evaluated for abd pain, cta done which did not show aneurysmal cause, other causes were also ruled out based on labs and ct scan, patietnt to fu with gi and pmd.  Patient to be discharged from ED. Any available test results were discussed with patient and/or family. Verbal instructions given, including instructions to return to ED immediately for any new, worsening, or concerning symptoms. Patient endorsed understanding. Written discharge instructions additionally given, including follow-up plan.

## 2020-08-18 NOTE — ED PROVIDER NOTE - PROVIDER TOKENS
PROVIDER:[TOKEN:[[id number]],FOLLOWUP:[1-3 Days]],PROVIDER:[TOKEN:[[id number]],FOLLOWUP:[1-3 Days]]

## 2020-08-18 NOTE — ED PROVIDER NOTE - PATIENT PORTAL LINK FT
You can access the FollowMyHealth Patient Portal offered by [facility] by registering at the following website: [email or web address]. By joining [employer]’s FollowMyHealth portal, you will also be able to view your health information using other applications (apps) compatible with our system. You can access the FollowMyHealth Patient Portal offered by [facility] by registering at the following website: [email or web address]. By joining [employer]’s FollowMyHealth portal, you will also be able to view your health information using other applications (apps) compatible with our system.

## 2020-08-18 NOTE — ED PROVIDER NOTE - OBJECTIVE STATEMENT
74 y/o male with hx of AAA with stent, MI, kidney stones, diverticulitis, HTN presents to the ED with left flank pain since yesterday. patient denies any dysuria, gross hematuria , testicular pain. patient without any vomiting , diarrhea. patient without fever,chills. patient denies any falls, heavy lifting. patient denies any sob, cp, cough. no palpitations, syncope. patient c/o gnawing pain worse with movement.

## 2020-08-18 NOTE — ED PROVIDER NOTE - PROGRESS NOTE DETAILS
ATTENDING NOTE: 76 y/o with PMHx AAA repair, kidney stones and diverticulosis presents with 1 day of left sided abdominal and flank pain, described as constant and without assoc sxs. Last U/S for AAA assessment was normal approx 1 year ago with Dr. Lopez. On exam: PT in NAD. Cardiac- S1S2. Lungs- CTAB. Abdomen (+) mild diffuse tenderness, soft ND, no CVAT. Extremities- No LE edema. Neuro- grossly intact. Plan: obtain CT angio of abdomen and basic blood work. ATTENDING NOTE: 74 y/o with PMHx AAA repair, kidney stones and diverticulosis presents with 1 day of left sided abdominal and flank pain, described as constant and without assoc sxs. Last U/S for AAA assessment was normal approx 1 year ago with Dr. Lopez. On exam: PT in NAD. Cardiac- S1S2. Lungs- CTAB. Abdomen (+) mild diffuse tenderness, soft ND, no CVAT. Extremities- femoral pulses intact, No LE edema. Neuro- grossly intact. Plan: obtain CT angio of abdomen and basic blood work. reevaluated, pain has improved but has not completely resolved. repeat abd exam however is non tender and patietn is sleeping when I came into the room resting comfortably. tolerated po afterwards.

## 2020-09-01 PROBLEM — I25.10 CAD (CORONARY ARTERY DISEASE): Status: ACTIVE | Noted: 2017-09-05

## 2020-09-01 PROBLEM — I10 HTN (HYPERTENSION): Status: ACTIVE | Noted: 2019-05-01

## 2020-09-01 PROBLEM — I25.2 HISTORY OF MYOCARDIAL INFARCTION: Status: RESOLVED | Noted: 2017-09-05 | Resolved: 2020-01-01

## 2020-11-10 PROBLEM — I71.4 ABDOMINAL AORTIC ANEURYSM, WITHOUT RUPTURE: Chronic | Status: ACTIVE | Noted: 2020-01-01

## 2020-11-10 NOTE — ED ADULT NURSE REASSESSMENT NOTE - NS ED NURSE REASSESS COMMENT FT1
Received pt transferred from [facility] to be admitted to ICU. pt is positive COVID-19 as of 10/30 with c/o SOB and fever since 10/30. pt denies chest pain or difficulty breathing at this time. VS stable. pt is awake, A&Ox3 , denies any pain or discomfort. pt aware of the plan of care and has no questions or concerns. pt refused mattress alarm, verbalized understanding to use the call bell prior to attempting getting out of bed. call bell within reach. Safety maintained, will continue to monitor .

## 2020-11-10 NOTE — ED ADULT TRIAGE NOTE - CHIEF COMPLAINT QUOTE
BIBA via EMS via [employer] from home, pt complaining of shortness of breath and chest pain states he was tested COVID + on 10/30

## 2020-11-10 NOTE — H&P ADULT - NSHPLABSRESULTS_GEN_ALL_CORE
LABS  11-10    143  |  106  |  21<H>  ----------------------------<  139<H>  4.4   |  25  |  1.2    Ca    9.3      10 Nov 2020 11:57    TPro  6.3  /  Alb  3.5  /  TBili  1.2  /  DBili  x   /  AST  39  /  ALT  16  /  AlkPhos  77  11-10                          16.9   6.24  )-----------( 157      ( 10 Nov 2020 11:57 )             51.9       CARDIAC ENZYMES  Troponin T, Serum: <0.01 ng/mL (11.10.20 @ 11:57)     D-Dimer Assay, Quantitative: 2886    < from: Xray Chest 1 View-PORTABLE IMMEDIATE (11.10.20 @ 11:59) >    Impression:    New patchy bilateral airspace opacities.      < end of copied text >

## 2020-11-10 NOTE — CONSULT NOTE ADULT - ASSESSMENT
IMPRESSION:  Acute hypoxic respiratory failure  multi lobar pneumonia due to covid 19   Sepsis/ Septic shock       SUGGEST:      CNS: no  sedation    HEENT: Oral care    PULMONARY:  HOB @ 45 degrees.     high flow  taper FiO2 as tolerated  keep SaO2 >90  daily CXR  IV steroids    CARDIOVASCULAR: i=o.  watch for volume depletion     GI: GI prophylaxis.    OG feeding as tolerated    RENAL:  Follow up lytes.  Correct as needed  daily labs    INFECTIOUS DISEASE:   Follow up cultures.   ID consult  suspect 2nd infection given high WBC for COVID  Repeat Blood culture.   Remstemivir per ID        HEMATOLOGICAL:  DVT prophylaxis.    ENDOCRINE:  Follow up FS.  Insulin protocol if needed.    MUSCULOSKELETAL: bedrest    monitor in critical care   IMPRESSION:  Acute hypoxic respiratory failure  multi lobar pneumonia due to covid 19   Sepsis/ Septic shock       SUGGEST:      CNS: no  sedation    HEENT: Oral care    PULMONARY:  HOB @ 45 degrees.     high flow  taper FiO2 as tolerated  keep SaO2 >90  daily CXR  IV steroids  CT chest when more stable    CARDIOVASCULAR: i=o.  watch for volume depletion     GI: GI prophylaxis.    OG feeding as tolerated    RENAL:  Follow up lytes.  Correct as needed  daily labs    INFECTIOUS DISEASE:   Follow up cultures.   ID consult  suspect 2nd infection given high WBC for COVID  Repeat Blood culture.   Remstemivir per ID        HEMATOLOGICAL:  full dose Lovenox given D dimer    ENDOCRINE:  Follow up FS.  Insulin protocol if needed.    MUSCULOSKELETAL: bedrest    monitor in critical care

## 2020-11-10 NOTE — ED PROVIDER NOTE - ATTENDING CONTRIBUTION TO CARE
75 M to ED with SOB  h/o AAA s/p repair, HTN, HLD, CAD  Dx with covid 2wks ago and since then sx worsening, no with hi 02 req.  AVSS, exam as noted, RRR, abdomen soft NTND, (+) bowel sounds, neuro nonfocal + scattered wheezing.

## 2020-11-10 NOTE — H&P ADULT - NSICDXPASTMEDICALHX_GEN_ALL_CORE_FT
PAST MEDICAL HISTORY:  Abdominal aortic aneurysm     Essential hypertension     High cholesterol     Myocardial infarction

## 2020-11-10 NOTE — CONSULT NOTE ADULT - SUBJECTIVE AND OBJECTIVE BOX
HPI:   76 yo male, pmh of AAA s/p repari, htn, hld, cad, presents to ed for sob and severe weakness, Pt was dx with covid-19  10 days ago, today felt more weak and sob, ems found the pt to be hypoxic (10 Nov 2020 13:21)      CC/ HPI Patient is a 75y old  Male who presents with a chief complaint of covid-19 (10 Nov 2020 13:21)      ^COVID+    No pertinent family history in first degree relatives      Abdominal aortic aneurysm    Essential hypertension    High cholesterol    Myocardial infarction    COVID-19    S/P aneurysm repair    No significant past surgical history      Shortness of breath    Elevated d-dimer    Viral pneumonia    SysAdmin_VstLnk        FAMILY HISTORY:  No pertinent family history in first degree relatives        No Known Allergies        Marital Status:  (   )    (   ) Single    (   )    (  )   Occupation:   Lives with: (  ) alone  (  ) children   (  ) spouse   (  ) parents  (  ) other  Recent Travel:     Substance Use (street drugs): (  ) never used  (  ) other:  Tobacco Usage:  (   ) never smoked   (   ) former smoker   (   ) current smoker  (     ) pack year  Alcohol Usage:        Home prescriptions  Aspir 81 oral delayed release tablet: 1 tab(s) orally once a day  Lipitor 80 mg oral tablet: 1 tab(s) orally once a day  lisinopril 10 mg oral tablet: 1 tab(s) orally once a day  metoprolol succinate 50 mg oral tablet, extended release: 1 tab(s) orally once a day  oxycodone-acetaminophen 5 mg-325 mg oral tablet: 1 tab(s) orally every 8 hours MDD:3      MEDICATIONS  (STANDING):  aspirin enteric coated 81 milliGRAM(s) Oral daily  atorvastatin 80 milliGRAM(s) Oral at bedtime  dexAMETHasone  Injectable 6 milliGRAM(s) IV Push Once    MEDICATIONS  (PRN):          T(C): 37 (11-10-20 @ 13:07), Max: 37 (11-10-20 @ 11:50)  HR: 88 (11-10-20 @ 11:50) (88 - 88)  BP: 146/90 (11-10-20 @ 11:50) (146/90 - 146/90)  RR: 20 (11-10-20 @ 11:53) (20 - 22)  SpO2: 99% (11-10-20 @ 11:53) (86% - 99%)  ICU Vital Signs Last 24 Hrs  T(C): 37 (10 Nov 2020 13:07), Max: 37 (10 Nov 2020 11:50)  T(F): 98.6 (10 Nov 2020 13:07), Max: 98.6 (10 Nov 2020 11:50)  HR: 88 (10 Nov 2020 11:50) (88 - 88)  BP: 146/90 (10 Nov 2020 11:50) (146/90 - 146/90)-  RR: 20 (10 Nov 2020 11:53) (20 - 22)  SpO2: 99% (10 Nov 2020 11:53) (86% - 99%)      I&O's Summary      I&O's Detail      Drug Dosing Weight  Height (cm): 177.8 (10 Nov 2020 11:33)  Weight (kg): 88.5 (10 Nov 2020 11:33)  BMI (kg/m2): 28 (10 Nov 2020 11:33)  BSA (m2): 2.07 (10 Nov 2020 11:33)    LABS:                          16.9   6.24  )-----------( 157      ( 10 Nov 2020 11:57 )             51.9       11-10    143  |  106  |  21<H>  ----------------------------<  139<H>  4.4   |  25  |  1.2    Ca    9.3      10 Nov 2020 11:57    TPro  6.3  /  Alb  3.5  /  TBili  1.2  /  DBili  x   /  AST  39  /  ALT  16  /  AlkPhos  77  11-10      LIVER FUNCTIONS - ( 10 Nov 2020 11:57 )  Alb: 3.5 g/dL / Pro: 6.3 g/dL / ALK PHOS: 77 U/L / ALT: 16 U/L / AST: 39 U/L / GGT: x             CARDIAC MARKERS ( 10 Nov 2020 11:57 )  x     / <0.01 ng/mL / x     / x     / x            D-Dimer Assay, Quantitative: 2886 ng/mL DDU (11-10-20 @ 11:57)      Serum Pro-Brain Natriuretic Peptide: 1085 pg/mL (11-10-20 @ 11:57)      RADIOLOGY:  I have personally reviewed all chest and other pertinent radiology films.      REVIEW OF SYSTEMS:     · CONSTITUTIONAL:   no fever   no chills.      · EYES:   no discharge,   no irritation,    no visual changes.    · ENMT:   Ears: no ear pain and no hearing problems.  Nose: no nasal congestion and no nasal drainage.      · CARDIOVASCULAR:   no chest pain,   no swelling  no palpitations      · RESPIRATORY:  +SOB,  no wheezing ,  +respiratory difficulty  no sputum production    · GASTROINTESTINAL:   no abdominal pain,    no nausea   no vomiting.       · MUSCULOSKELETAL:   no back pain,   no neck pain,   no weakness.    · SKIN:   no pruritis,   no rashes.    · NEURO:   no loss of consciousness,   no headache,   no weakness.    · PSYCHIATRIC:   no known mental health issues  no anxiety  no depression        ALLERGIC/IMMUNOLOGIC:   No active allergic or immunologic issues    all other systems are negative        PHYSICAL EXAM:     · CONSTITUTIONAL:   not septic appearing,   well nourished,   NAD    · ENMT:   Airway patent,   Nasal mucosa clear.  Mouth with normal mucosa.   No thrush    · EYES:   Clear bilaterally,   pupils equal,   round and reactive to light.    · CARDIAC:   Normal rate,   regular rhythm.    Heart sounds S1, S2.   no thrills or bruits on palpitation  normal  cardiac impulse  No murmurs, no rubs or gallops on auscultation  no edema        CAROTID:   normal systolic impulse  no bruits    · RESPIRATORY:   no w/r/r/,   normal chest expansion  not tachypneic,  no retractions or use of accessory muscles  palpation of chest is normal with no fremitus  percussion of chest demonstrates no hyperresonance or dullness    · GASTROINTESTINAL:  Abdomen soft,   non-tender,   no guarding,   + BS  liver spleen not palpable    GENITOURINARY  normal genitalia for sex  no edema    · MUSCULOSKELETAL:   range of motion is not limited,  no clubbing, cyanosis  no petechiae    · NEUROLOGICAL:   Alert and oriented   no obvious focal deficits in cranial nerve areas  no motor or sensory deficits.      · SKIN:   Skin normal color for race,   warm,   dry and intact.       · PSYCHIATRIC:   Alert and oriented to person,   place, time/situation.   normal mood and affect.   no apparent risk to self or others.    · HEME LYMPH:   no splenomegaly.  No cervical  lymphadenopathy.  no inguinal lymphadenopathy       HPI:   74 yo male, pmh of AAA s/p repari, htn, hld, cad, presents to ed for sob and severe weakness, Pt was dx with covid-19  10 days ago, today felt more weak and sob, ems found the pt to be hypoxic (10 Nov 2020 13:21)      CC/ HPI Patient is a 75y old  Male who presents with a chief complaint of covid-19 (10 Nov 2020 13:21)      ^COVID+    No pertinent family history in first degree relatives      Abdominal aortic aneurysm    Essential hypertension    High cholesterol    Myocardial infarction    COVID-19    S/P aneurysm repair    No significant past surgical history      Shortness of breath    Elevated d-dimer    Viral pneumonia    SysAdmin_VstLnk        FAMILY HISTORY:  No pertinent family history in first degree relatives        No Known Allergies        Marital Status:  ( x  )    (   ) Single    (   )    (  )   Occupation:   Lives with: (  ) alone  (  ) children   (x  ) spouse   (  ) parents  (  ) other  Recent Travel:     Substance Use (street drugs): (  ) never used  (  ) other:  Tobacco Usage:  (   ) never smoked   (  x ) former smoker   (   ) current smoker  (     ) pack year  Alcohol Usage:        Home prescriptions  Aspir 81 oral delayed release tablet: 1 tab(s) orally once a day  Lipitor 80 mg oral tablet: 1 tab(s) orally once a day  lisinopril 10 mg oral tablet: 1 tab(s) orally once a day  metoprolol succinate 50 mg oral tablet, extended release: 1 tab(s) orally once a day  oxycodone-acetaminophen 5 mg-325 mg oral tablet: 1 tab(s) orally every 8 hours MDD:3      MEDICATIONS  (STANDING):  aspirin enteric coated 81 milliGRAM(s) Oral daily  atorvastatin 80 milliGRAM(s) Oral at bedtime  dexAMETHasone  Injectable 6 milliGRAM(s) IV Push Once    MEDICATIONS  (PRN):          T(C): 37 (11-10-20 @ 13:07), Max: 37 (11-10-20 @ 11:50)  HR: 88 (11-10-20 @ 11:50) (88 - 88)  BP: 146/90 (11-10-20 @ 11:50) (146/90 - 146/90)  RR: 20 (11-10-20 @ 11:53) (20 - 22)  SpO2: 99% (11-10-20 @ 11:53) (86% - 99%)  ICU Vital Signs Last 24 Hrs  T(C): 37 (10 Nov 2020 13:07), Max: 37 (10 Nov 2020 11:50)  T(F): 98.6 (10 Nov 2020 13:07), Max: 98.6 (10 Nov 2020 11:50)  HR: 88 (10 Nov 2020 11:50) (88 - 88)  BP: 146/90 (10 Nov 2020 11:50) (146/90 - 146/90)-  RR: 20 (10 Nov 2020 11:53) (20 - 22)  SpO2: 99% (10 Nov 2020 11:53) (86% - 99%)      I&O's Summary      I&O's Detail      Drug Dosing Weight  Height (cm): 177.8 (10 Nov 2020 11:33)  Weight (kg): 88.5 (10 Nov 2020 11:33)  BMI (kg/m2): 28 (10 Nov 2020 11:33)  BSA (m2): 2.07 (10 Nov 2020 11:33)    LABS:                          16.9   6.24  )-----------( 157      ( 10 Nov 2020 11:57 )             51.9       11-10    143  |  106  |  21<H>  ----------------------------<  139<H>  4.4   |  25  |  1.2    Ca    9.3      10 Nov 2020 11:57    TPro  6.3  /  Alb  3.5  /  TBili  1.2  /  DBili  x   /  AST  39  /  ALT  16  /  AlkPhos  77  11-10      LIVER FUNCTIONS - ( 10 Nov 2020 11:57 )  Alb: 3.5 g/dL / Pro: 6.3 g/dL / ALK PHOS: 77 U/L / ALT: 16 U/L / AST: 39 U/L / GGT: x             CARDIAC MARKERS ( 10 Nov 2020 11:57 )  x     / <0.01 ng/mL / x     / x     / x            D-Dimer Assay, Quantitative: 2886 ng/mL DDU (11-10-20 @ 11:57)      Serum Pro-Brain Natriuretic Peptide: 1085 pg/mL (11-10-20 @ 11:57)      RADIOLOGY:  I have personally reviewed all chest and other pertinent radiology films.      REVIEW OF SYSTEMS:     · CONSTITUTIONAL:   no fever   no chills.      · EYES:   no discharge,   no irritation,    no visual changes.    · ENMT:   Ears: no ear pain and no hearing problems.  Nose: no nasal congestion and no nasal drainage.      · CARDIOVASCULAR:   no chest pain,   no swelling  no palpitations      · RESPIRATORY:  +SOB,  no wheezing ,  +respiratory difficulty  no sputum production    · GASTROINTESTINAL:   no abdominal pain,    no nausea   no vomiting.       · MUSCULOSKELETAL:   no back pain,   no neck pain,   no weakness.    · SKIN:   no pruritis,   no rashes.    · NEURO:   no loss of consciousness,   no headache,   no weakness.    · PSYCHIATRIC:   no known mental health issues  no anxiety  no depression        ALLERGIC/IMMUNOLOGIC:   No active allergic or immunologic issues    all other systems are negative        PHYSICAL EXAM:     · CONSTITUTIONAL:   not septic appearing,   well nourished,   NAD    · ENMT:   Airway patent,   Nasal mucosa clear.  Mouth with normal mucosa.   No thrush    · EYES:   Clear bilaterally,   pupils equal,   round and reactive to light.    · CARDIAC:   Normal rate,   regular rhythm.    Heart sounds S1, S2.   no thrills or bruits on palpitation  normal  cardiac impulse  No murmurs, no rubs or gallops on auscultation  no edema        CAROTID:   normal systolic impulse  no bruits    · RESPIRATORY:   rales all lung fields  normal chest expansion  tachypneic,  no retractions or use of accessory muscles  palpation of chest is normal with no fremitus  percussion of chest demonstrates no hyperresonance or dullness    · GASTROINTESTINAL:  Abdomen soft,   non-tender,   no guarding,   + BS  liver spleen not palpable      · MUSCULOSKELETAL:   range of motion is not limited,  no clubbing, cyanosis  no petechiae.      · SKIN:   Skin normal color for race,   warm,   dry and intact.       · PSYCHIATRIC:   Alert and oriented to person,   place, time/situation.   normal mood and affect.   no apparent risk to self or others.    · HEME LYMPH:   no splenomegaly.  No cervical  lymphadenopathy.  no inguinal lymphadenopathy

## 2020-11-10 NOTE — H&P ADULT - NSHPPHYSICALEXAM_GEN_ALL_CORE
PHYSICAL EXAM:  Vital Signs Last 24 Hrs  T(C): 37 (10 Nov 2020 13:07), Max: 37 (10 Nov 2020 11:50)  T(F): 98.6 (10 Nov 2020 13:07), Max: 98.6 (10 Nov 2020 11:50)  HR: 88 (10 Nov 2020 11:50) (88 - 88)  BP: 146/90 (10 Nov 2020 11:50) (146/90 - 146/90)  BP(mean): --  RR: 20 (10 Nov 2020 11:53) (20 - 22)  SpO2: 99% (10 Nov 2020 11:53) (86% - 99%)    GENERAL: NAD  HEAD:  Atraumatic, Normocephalic  EYES: EOMI, PERRLA, conjunctiva and sclera clear  ENMT: No tonsillar erythema, exudates, or enlargement; Moist mucous membranes, Good dentition, No lesions  NECK: Supple, No JVD, Normal thyroid  NERVOUS SYSTEM:  Alert & Oriented X3, Good concentration; Motor Strength 5/5 B/L upper and lower extremities; DTRs 2+ intact and symmetric  CHEST/LUNG:  bilateral rhonchi  HEART: Regular rate and rhythm; No murmurs, rubs, or gallops  ABDOMEN: Soft, Nontender, Nondistended; Bowel sounds present  EXTREMITIES:  2+ Peripheral Pulses, No clubbing, cyanosis, or edema  LYMPH: No lymphadenopathy noted  SKIN: No rashes or lesions

## 2020-11-10 NOTE — ED PROVIDER NOTE - PROGRESS NOTE DETAILS
Case d/w Dr. Edward, aware of pt and approved for icu north. Dr. Hammonds aware of pt, req decadron 6. pt on HFNC, sat above 92. campbell - Patient arrived in critical care area at [facility]. Already approved and admitted to ICU. ICU resident is aware patient is here, will come evaluate.

## 2020-11-10 NOTE — CHART NOTE - NSCHARTNOTEFT_GEN_A_CORE
Pt placed on 50 L/ 70% HFNC, Saturation 87%, Fi02 increased to  90%, Nurse aware. Will continue to monitor.

## 2020-11-10 NOTE — ED PROVIDER NOTE - OBJECTIVE STATEMENT
74 yo male, pmh of AAA s/p repari, htn, hld, cad, presents to ed for sob. Pt was dx with covid 2 weeks ago, today felt sob, at rest, moderate, no specific pain or radiation. denies fever, chills, cough, cp, abd pain, le swelling.

## 2020-11-10 NOTE — ED ADULT NURSE NOTE - TEMPLATE
Attempted to call  Pt's [facility] Zelaya, [facility]        Felicia Hylton RN  11/04/19 [id number]
Patient is resting comfortably, waiting for agustine        Chet Friend RN  11/04/19 [id number]
Pt waiting for osito Cole RN  11/04/19 [id number]
Respiratory

## 2020-11-10 NOTE — ED ADULT NURSE REASSESSMENT NOTE - NS ED NURSE REASSESS COMMENT FT1
pt left with [facility] to [place] ED. pt departed ED-S stable. Report given to Crit Lead TONY Gregorio and Charge TONY Kaufman

## 2020-11-10 NOTE — H&P ADULT - HISTORY OF PRESENT ILLNESS
76 yo male, pmh of AAA s/p repari, htn, hld, cad, presents to ed for sob and severe weakness, Pt was dx with covid-19  10 days ago, today felt more weak and sob, ems found the pt to be hypoxic

## 2020-11-10 NOTE — ED ADULT NURSE NOTE - PMH
Abdominal aortic aneurysm    Essential hypertension    High cholesterol    Myocardial infarction

## 2020-11-10 NOTE — H&P ADULT - ASSESSMENT
74 yo male, pmh of AAA s/p repari, htn, hld, cad, presents to ed for sob and severe weakness, Pt was dx with covid-19  10 days ago, today felt more weak and sob, ems found the pt to be hypoxic     COVID-19 / hypoxemia     -transfer to ICU [facility] site as per protocol   - will give decadron 6mg daily   - will start Lovenox 80mgsc q12h as d-dimer is elevated   - will start Remdesivir   - continue high flow oxygen   - will check inflammatory markers   - ID and pulmonary consults 74 yo male, pmh of AAA s/p repari, htn, hld, cad, presents to ed for sob and severe weakness, Pt was dx with covid-19  10 days ago, today felt more weak and sob, ems found the pt to be hypoxic     COVID-19 / hypoxemia     -transfer to ICU [facility] site as per protocol   - will give decadron 6mg daily   - will start Lovenox 80mgsc q12h as d-dimer is elevated   - will start Remdesivir   - continue high flow oxygen   - will check inflammatory markers   - ID and pulmonary consults    - I discussed plan with pulmonary attending and fellow

## 2020-11-10 NOTE — ED PROVIDER NOTE - PHYSICAL EXAMINATION
Physical Exam    Vital Signs: I have reviewed the initial vital signs.  Constitutional: well-nourished, appears stated age, no acute distress  Eyes: Conjunctiva pink, Sclera clear  Cardiovascular: S1 and S2, regular rate, regular rhythm, well-perfused extremities, radial pulses equal and 2+, pedal pulses 2+ and equal   Respiratory: mild labored respiratory effort, able to speak in full sentences, scattered rhonchi.   Gastrointestinal: soft, non-tender abdomen, no pulsatile mass, normal bowl sounds  Musculoskeletal: supple neck, no lower extremity edema, no midline tenderness  Integumentary: warm, dry, no rash  Neurologic: awake, alert, nvi

## 2020-11-10 NOTE — H&P ADULT - NSHPREVIEWOFSYSTEMS_GEN_ALL_CORE
CONSTITUTIONAL: yes weakness, no fevers or chills  EYES/ENT: No visual changes;  No vertigo or throat pain   NECK: No pain or stiffness  RESPIRATORY: yes cough and  shortness of breath  CARDIOVASCULAR: No chest pain or palpitations  GASTROINTESTINAL: No abdominal or epigastric pain. No nausea, vomiting, or hematemesis; No diarrhea or constipation. No melena or hematochezia.  GENITOURINARY: No dysuria, frequency or hematuria  NEUROLOGICAL: No numbness or weakness  SKIN: No itching, rashes

## 2020-11-10 NOTE — ED PROVIDER NOTE - CARE PLAN
Principal Discharge DX:	COVID-19  Secondary Diagnosis:	Viral pneumonia  Secondary Diagnosis:	Elevated d-dimer  Secondary Diagnosis:	Shortness of breath

## 2020-11-11 NOTE — PROGRESS NOTE ADULT - ASSESSMENT
IMPRESSION:    Acute hypoxic respiratory failure on 100% NRM  Sepsis present on admission  multi lobar pneumonia due to covid 19   Sepsis/ Septic shock       SUGGEST:      CNS: Avoid CNS depressant    HEENT: Oral care    PULMONARY:  HOB @ 45 degrees.  HHFNC alternate with BIPAP, keep Sao2 92 to 96%, Decadron      CARDIOVASCULAR: i=o.    GI: GI prophylaxis.    OG feeding as tolerated    RENAL:  Follow up lytes.  Correct as needed  daily labs    INFECTIOUS DISEASE: infl markers, RZD/ Decadron/ PROCAL        HEMATOLOGICAL:  full dose Lovenox given D dimer    ENDOCRINE:  Follow up FS.  Insulin protocol if needed.    MUSCULOSKELETAL: bedrest    CEU  poor prognosis

## 2020-11-11 NOTE — PROGRESS NOTE ADULT - SUBJECTIVE AND OBJECTIVE BOX
OVERNIGHT EVENTS: events noted, transferred from [facility] on 100% NRM    Vital Signs Last 24 Hrs  T(C): 36.5 (11 Nov 2020 04:00), Max: 37 (10 Nov 2020 11:50)  T(F): 97.7 (11 Nov 2020 04:00), Max: 98.6 (10 Nov 2020 11:50)  HR: 64 (11 Nov 2020 06:00) (52 - 88)  BP: 164/71 (11 Nov 2020 06:00) (130/70 - 164/71)  RR: 20 (10 Nov 2020 19:21) (20 - 22)  SpO2: 95% (11 Nov 2020 06:00) (86% - 99%)    PHYSICAL EXAMINATION:    GENERAL: ill looking.     HEENT: Head is normocephalic and atraumatic. Extraocular muscles are intact. Mucous membranes are moist.    NECK: Supple.    LUNGS: bl crackles    HEART: TAMMIE 3/6    ABDOMEN: Soft, nontender, and nondistended.      EXTREMITIES: Without any cyanosis, clubbing, rash, lesions or edema.    NEUROLOGIC: Grossly intact.    SKIN: No ulceration or induration present.      LABS:                        16.9   6.24  )-----------( 157      ( 10 Nov 2020 11:57 )             51.9     11-10    x   |  x   |  x   ----------------------------<  x   x    |  x   |  1.1    Ca    9.3      10 Nov 2020 11:57    TPro  6.1  /  Alb  3.1<L>  /  TBili  1.0  /  DBili  0.4<H>  /  AST  47<H>  /  ALT  18  /  AlkPhos  73  11-10          CARDIAC MARKERS ( 10 Nov 2020 15:38 )  x     / x     / 45 U/L / x     / x      CARDIAC MARKERS ( 10 Nov 2020 11:57 )  x     / <0.01 ng/mL / x     / x     / x          D-Dimer Assay, Quantitative: 3262 ng/mL DDU (11-10-20 @ 15:38)  D-Dimer Assay, Quantitative: 2886 ng/mL DDU (11-10-20 @ 11:57)    Serum Pro-Brain Natriuretic Peptide: 1085 pg/mL (11-10-20 @ 11:57)    Lactate, Blood: 1.6 mmol/L (11-10-20 @ 15:38)    Procalcitonin, Serum: 0.17 ng/mL (11-10-20 @ 11:57)          MICROBIOLOGY:      MEDICATIONS  (STANDING):  aspirin enteric coated 81 milliGRAM(s) Oral daily  atorvastatin 80 milliGRAM(s) Oral at bedtime  dexAMETHasone  Injectable 6 milliGRAM(s) IV Push daily  enoxaparin Injectable 80 milliGRAM(s) SubCutaneous two times a day  influenza   Vaccine 0.5 milliLiter(s) IntraMuscular once  remdesivir  IVPB   IV Intermittent   remdesivir  IVPB 100 milliGRAM(s) IV Intermittent every 24 hours    MEDICATIONS  (PRN):      RADIOLOGY & ADDITIONAL STUDIES:

## 2020-11-11 NOTE — CONSULT NOTE ADULT - ASSESSMENT
74 yo male, pmh of AAA s/p repari, htn, hld, cad, presents to ed for sob and severe weakness, Pt was dx with covid-19  10 days ago, today felt more weak and sob, ems found the pt to be hypoxic     IMPRESSION;  COVID19 with severe/critical  illness. Hypoxemia with SpO2 < 94% NRB 15 lit  . CXR >50% opacities.  Pt is in the late immune mediated phase based on the onset of symptoms >10 d ( anti virals / plasma of no benefit )  Serology positive   Cytokine response  Elevation of the inflammatory markers  -procalcitonin of 0.17 not suggestive of a bacterial PNA/infection  -Ddimers 3262  -Ferritin 2998  -CRP 16.49  -    RECOMMENDATIONS;  D/c RDV  Plasma of no benefit  Dexamethasone 6 mg iv q24h for 10 days ( or until discharge ) or equivalent glucocorticoid dose may be substituted. Equivalent total dosis of alternative steroids are Methylprednisolone 32 mg and prednisone 40 mg q24h.  Monitor for side effects: hyperglycemia, neurological ( agitation/confusion), adrenal suppression, bacterial and fungal infections  Tocilizumab 400 mg iv x once. Ferritin 48h later  Anticoagulation as per team  Will evaluate for enrollment  in the monoclonal antibodies vs interleukin investigational study protocol.

## 2020-11-11 NOTE — PROGRESS NOTE ADULT - SUBJECTIVE AND OBJECTIVE BOX
SUBJECTIVE:  Patient is a 75y old Male who presents with a chief complaint of covid-19 (11 Nov 2020 08:37)   Currently admitted to medicine with the primary diagnosis of COVID-19     Today is hospital day 1d. There are no new issues or overnight events.       HPI  HPI:   74 yo male, pmh of AAA s/p repari, htn, hld, cad, presents to ed for sob and severe weakness, Pt was dx with covid-19  10 days ago, today felt more weak and sob, ems found the pt to be hypoxic (10 Nov 2020 13:21)      PAST MEDICAL & SURGICAL HISTORY  Abdominal aortic aneurysm    Essential hypertension    High cholesterol    Myocardial infarction    S/P aneurysm repair      SOCIAL HISTORY:  Negative for smoking/alcohol/drug use.     ALLERGIES:  No Known Allergies    MEDICATIONS:  HOME MEDICATIONS  Aspir 81 oral delayed release tablet: 1 tab(s) orally once a day  Lipitor 80 mg oral tablet: 1 tab(s) orally once a day  lisinopril 10 mg oral tablet: 1 tab(s) orally once a day  metoprolol succinate 50 mg oral tablet, extended release: 1 tab(s) orally once a day  oxycodone-acetaminophen 5 mg-325 mg oral tablet: 1 tab(s) orally every 8 hours MDD:3    STANDING MEDICATIONS  aspirin enteric coated 81 milliGRAM(s) Oral daily  atorvastatin 80 milliGRAM(s) Oral at bedtime  dexAMETHasone  Injectable 6 milliGRAM(s) IV Push daily  enoxaparin Injectable 80 milliGRAM(s) SubCutaneous two times a day  influenza   Vaccine 0.5 milliLiter(s) IntraMuscular once  potassium chloride   Powder 40 milliEquivalent(s) Oral every 4 hours  remdesivir  IVPB   IV Intermittent   remdesivir  IVPB 100 milliGRAM(s) IV Intermittent every 24 hours    PRN MEDICATIONS    VITALS:   ICU Vital Signs Last 24 Hrs  T(C): 36.2 (11 Nov 2020 08:57), Max: 37 (10 Nov 2020 11:50)  T(F): 97.1 (11 Nov 2020 08:57), Max: 98.6 (10 Nov 2020 11:50)  HR: 62 (11 Nov 2020 08:57) (52 - 88)  BP: 155/83 (11 Nov 2020 08:57) (130/70 - 164/71)  BP(mean): --  ABP: --  ABP(mean): --  RR: 20 (11 Nov 2020 08:57) (20 - 22)  SpO2: 93% (11 Nov 2020 08:57) (86% - 99%)    LABS:                        16.9   6.24  )-----------( 157      ( 10 Nov 2020 11:57 )             51.9     11-10    x   |  x   |  x   ----------------------------<  x   x    |  x   |  1.1    Ca    9.3      10 Nov 2020 11:57    TPro  6.1  /  Alb  3.1<L>  /  TBili  1.0  /  DBili  0.4<H>  /  AST  47<H>  /  ALT  18  /  AlkPhos  73  11-10        I&O's Detail      Lactate, Blood: 1.6 mmol/L (11-10-20 @ 15:38)  Creatine Kinase, Serum: 45 U/L (11-10-20 @ 15:38)  Troponin T, Serum: <0.01 ng/mL (11-10-20 @ 11:57)        CARDIAC MARKERS ( 10 Nov 2020 15:38 )  x     / x     / 45 U/L / x     / x      CARDIAC MARKERS ( 10 Nov 2020 11:57 )  x     / <0.01 ng/mL / x     / x     / x        PHYSICAL EXAM:  GEN: No acute distress  HEENT: Normocephalic  NECK: Supple  LUNGS: Decreased breathe sounds  HEART: Regular  ABD: Soft, non-tender, non-distended.  EXT: NE/2+PP  NEURO: AAOX3  PSYCH: Appropriate mood, responds appropriately

## 2020-11-11 NOTE — CONSULT NOTE ADULT - SUBJECTIVE AND OBJECTIVE BOX
MIGUEL ALMODOVAR  75y, Male  Allergy: No Known Allergies      All historical available data reviewed.    HPI:   74 yo male, pmh of AAA s/p repari, htn, hld, cad, presents to ed for sob and severe weakness, Pt was dx with covid-19  10 days ago, today felt more weak and sob, ems found the pt to be hypoxic (10 Nov 2020 13:21)    FAMILY HISTORY:  No pertinent family history in first degree relatives      PAST MEDICAL & SURGICAL HISTORY:  Abdominal aortic aneurysm    Essential hypertension    High cholesterol    Myocardial infarction    S/P aneurysm repair          VITALS:  T(F): 97.8, Max: 98.6 (11-10-20 @ 11:50)  HR: 52  BP: 153/74  RR: 20Vital Signs Last 24 Hrs  T(C): 36.6 (10 Nov 2020 18:35), Max: 37 (10 Nov 2020 11:50)  T(F): 97.8 (10 Nov 2020 18:35), Max: 98.6 (10 Nov 2020 11:50)  HR: 52 (11 Nov 2020 00:33) (52 - 88)  BP: 153/74 (11 Nov 2020 00:33) (130/70 - 153/74)  BP(mean): --  RR: 20 (10 Nov 2020 19:21) (20 - 22)  SpO2: 99% (11 Nov 2020 00:33) (86% - 99%)    TESTS & MEASUREMENTS:                        16.9   6.24  )-----------( 157      ( 10 Nov 2020 11:57 )             51.9     11-10    x   |  x   |  x   ----------------------------<  x   x    |  x   |  1.1    Ca    9.3      10 Nov 2020 11:57    TPro  6.1  /  Alb  3.1<L>  /  TBili  1.0  /  DBili  0.4<H>  /  AST  47<H>  /  ALT  18  /  AlkPhos  73  11-10    LIVER FUNCTIONS - ( 10 Nov 2020 15:38 )  Alb: 3.1 g/dL / Pro: 6.1 g/dL / ALK PHOS: 73 U/L / ALT: 18 U/L / AST: 47 U/L / GGT: x                   RADIOLOGY & ADDITIONAL TESTS:  Personal review of radiological diagnostics performed  Echo and EKG results noted when applicable.     MEDICATIONS:  aspirin enteric coated 81 milliGRAM(s) Oral daily  atorvastatin 80 milliGRAM(s) Oral at bedtime  dexAMETHasone  Injectable 6 milliGRAM(s) IV Push daily  enoxaparin Injectable 80 milliGRAM(s) SubCutaneous two times a day  influenza   Vaccine 0.5 milliLiter(s) IntraMuscular once  remdesivir  IVPB   IV Intermittent   remdesivir  IVPB 100 milliGRAM(s) IV Intermittent every 24 hours      ANTIBIOTICS:  remdesivir  IVPB   IV Intermittent   remdesivir  IVPB 100 milliGRAM(s) IV Intermittent every 24 hours

## 2020-11-11 NOTE — RESEARCH COMMUNICATION NOTE - NS AS RESEARCH COMMUNICATION NOTE FT
Patient meets eligibility for HepCovid trial. An informed consent was obtained and will be kept in the research chart for the patient. A new order for LOVENOX (research dose) will be placed and the dose will be available from [facility]. Since this is blinded trial the Lovenox dose is not known to patient or researchers. If you have any Qs you can call Sophie (Research Coordinator) on [phone] or [phone]. You can also Dr Sams [id number] [phone]), the Primary Investigator at [facility]. A communication order to nursing staff will be placed.

## 2020-11-12 NOTE — CONSULT NOTE ADULT - SUBJECTIVE AND OBJECTIVE BOX
Hospital Day:  2d     Chief Complaint: Patient is a 75y old  Male who presents with a chief complaint of covid-19 (12 Nov 2020 09:48)    HPI:   76 yo male, pmh of AAA s/p repari, htn, hld, cad, presents to ed for sob and severe weakness, Pt was dx with covid-19  10 days ago, today felt more weak and sob, ems found the pt to be hypoxic (10 Nov 2020 13:21)    Consult: 75M w/ PMHx of AAA s/p repair, HTN, HLD, CAD presented to [facility] ED for SOB and severe weakness. He was tested positive COVID-19 about 10 days ago then progressively became weak, SOB and hypoxic. He was transferred to [facility] for COVID19 PNA treatment. Cardiology is being consulted for V-tach episodes.     Past Medical Hx:   Abdominal aortic aneurysm    Essential hypertension    High cholesterol    Myocardial infarction      Past Sx:  S/P aneurysm repair    No significant past surgical history      Allergies:  No Known Allergies    Current Meds:   Standing Meds:  aspirin enteric coated 81 milliGRAM(s) Oral daily  atorvastatin 80 milliGRAM(s) Oral at bedtime  dexAMETHasone  Injectable 6 milliGRAM(s) IV Push daily  enoxaparin Study Injectable () 1 Dose(s) SubCutaneous two times a day  influenza   Vaccine 0.5 milliLiter(s) IntraMuscular once  pantoprazole    Tablet 40 milliGRAM(s) Oral before breakfast  QUEtiapine 25 milliGRAM(s) Oral at bedtime    PRN Meds:    HOME MEDICATIONS:  Aspir 81 oral delayed release tablet: 1 tab(s) orally once a day  Lipitor 80 mg oral tablet: 1 tab(s) orally once a day  lisinopril 10 mg oral tablet: 1 tab(s) orally once a day  metoprolol succinate 50 mg oral tablet, extended release: 1 tab(s) orally once a day      Vital Signs:   T(F): 96.8 (11-12-20 @ 12:00), Max: 98.3 (11-12-20 @ 00:00)  HR: 79 (11-12-20 @ 15:00) (59 - 95)  BP: 144/87 (11-12-20 @ 15:00) (128/102 - 169/85)  RR: 25 (11-12-20 @ 15:00) (22 - 34)  SpO2: 97% (11-12-20 @ 15:00) (92% - 99%)      11-11-20 @ 07:01  -  11-12-20 @ 07:00  --------------------------------------------------------  IN: 550 mL / OUT: 500 mL / NET: 50 mL    11-12-20 @ 07:01  -  11-12-20 @ 15:54  --------------------------------------------------------  IN: 0 mL / OUT: 700 mL / NET: -700 mL        Physical Exam:   GENERAL: NAD  HEENT: NCAT  CHEST/LUNG: CTAB  HEART: Regular rate and rhythm; s1 s2 appreciated, No murmurs, rubs, or gallops  ABDOMEN: Soft, Nontender, Nondistended; Bowel sounds present  EXTREMITIES: No LE edema b/l  SKIN: no rashes, no new lesions  NERVOUS SYSTEM:  Alert & Oriented X3  LINES/CATHETERS:        Labs:                         15.6   11.25 )-----------( 220      ( 12 Nov 2020 04:30 )             48.1     Neutophil% 81.6, Lymphocyte% 9.4, Monocyte% 8.4, Bands% 0.4 11-12-20 @ 04:30    12 Nov 2020 04:30    141    |  106    |  38     ----------------------------<  137    5.2     |  26     |  1.2      Ca    9.3        12 Nov 2020 04:30  Mg     2.2       12 Nov 2020 09:34    TPro  6.0    /  Alb  3.1    /  TBili  0.7    /  DBili  x      /  AST  36     /  ALT  29     /  AlkPhos  71     12 Nov 2020 04:30       pTT    --             ----< 1.27 INR  (11-11-20 @ 18:35)    14.60        PT        Serum Pro-Brain Natriuretic Peptide: 1085 pg/mL (11-10-20 @ 11:57)    Troponin <0.01, CKMB --, CK -- 11-12-20 @ 04:30  Troponin --, CKMB --, CK 83 11-11-20 @ 23:55  Troponin --, CKMB --, CK 45 11-10-20 @ 15:38  Troponin <0.01, CKMB --, CK -- 11-10-20 @ 11:57    Iron --, TIBC --, %Sat -- Ferritin 2998 11-10-20 @ 15:38  Iron --, TIBC --, %Sat -- Ferritin 3080 11-10-20 @ 11:57    Radiology:     < from: Xray Chest 1 View- PORTABLE-Routine (Xray Chest 1 View- PORTABLE-Routine in AM.) (11.12.20 @ 05:33) >  Impression: Low lung volumes. Slightly increased bilateral parenchymal opacities. No pneumothorax.  < end of copied text >   Hospital Day:  2d     Chief Complaint: Patient is a 75y old  Male who presents with a chief complaint of covid-19 (12 Nov 2020 09:48)    HPI:   74 yo male, pmh of AAA s/p repari, htn, hld, cad, presents to ed for sob and severe weakness, Pt was dx with covid-19  10 days ago, today felt more weak and sob, ems found the pt to be hypoxic (10 Nov 2020 13:21)    Consult: 75M w/ PMHx of AAA s/p repair, HTN, HLD, CAD presented to [facility] ED for SOB and severe weakness. He was tested positive COVID-19 about 10 days ago then progressively became weak, SOB and hypoxic. He was transferred to [facility] for COVID19 PNA treatment. He admits to cough, SOB, and constipation. He denies HA, fatigue, fever, chills, dizziness, change in vision, runny nose, sore throat, CP, wheezing, abd pain, nausea, vomiting, diarrhea, blood in stool or urine, and dysuria. Cardiology is being consulted for V-tach episodes.     Past Medical Hx:   Abdominal aortic aneurysm    Essential hypertension    High cholesterol    Myocardial infarction      Past Sx:  S/P aneurysm repair    No significant past surgical history      Allergies:  No Known Allergies    Current Meds:   Standing Meds:  aspirin enteric coated 81 milliGRAM(s) Oral daily  atorvastatin 80 milliGRAM(s) Oral at bedtime  dexAMETHasone  Injectable 6 milliGRAM(s) IV Push daily  enoxaparin Study Injectable () 1 Dose(s) SubCutaneous two times a day  influenza   Vaccine 0.5 milliLiter(s) IntraMuscular once  pantoprazole    Tablet 40 milliGRAM(s) Oral before breakfast  QUEtiapine 25 milliGRAM(s) Oral at bedtime    PRN Meds:    HOME MEDICATIONS:  Aspir 81 oral delayed release tablet: 1 tab(s) orally once a day  Lipitor 80 mg oral tablet: 1 tab(s) orally once a day  lisinopril 10 mg oral tablet: 1 tab(s) orally once a day  metoprolol succinate 50 mg oral tablet, extended release: 1 tab(s) orally once a day      Vital Signs:   T(F): 96.8 (11-12-20 @ 12:00), Max: 98.3 (11-12-20 @ 00:00)  HR: 79 (11-12-20 @ 15:00) (59 - 95)  BP: 144/87 (11-12-20 @ 15:00) (128/102 - 169/85)  RR: 25 (11-12-20 @ 15:00) (22 - 34)  SpO2: 97% (11-12-20 @ 15:00) (92% - 99%)      11-11-20 @ 07:01  -  11-12-20 @ 07:00  --------------------------------------------------------  IN: 550 mL / OUT: 500 mL / NET: 50 mL    11-12-20 @ 07:01  -  11-12-20 @ 15:54  --------------------------------------------------------  IN: 0 mL / OUT: 700 mL / NET: -700 mL        Physical Exam:   GENERAL: Resting on bed  HEENT: NCAT, currently on Bipap machine  CHEST/LUNG: CTAB  HEART: Regular rate and rhythm; s1 s2 appreciated, No murmurs, rubs, or gallops  ABDOMEN: Soft and obese abdomen, Nontender, Nondistended; Bowel sounds present  EXTREMITIES: No LE edema b/l  SKIN: no rashes, no new lesions  NERVOUS SYSTEM:  Alert & Oriented X3    Labs:                         15.6   11.25 )-----------( 220      ( 12 Nov 2020 04:30 )             48.1     Neutophil% 81.6, Lymphocyte% 9.4, Monocyte% 8.4, Bands% 0.4 11-12-20 @ 04:30    12 Nov 2020 04:30    141    |  106    |  38     ----------------------------<  137    5.2     |  26     |  1.2      Ca    9.3        12 Nov 2020 04:30  Mg     2.2       12 Nov 2020 09:34    TPro  6.0    /  Alb  3.1    /  TBili  0.7    /  DBili  x      /  AST  36     /  ALT  29     /  AlkPhos  71     12 Nov 2020 04:30       pTT    --             ----< 1.27 INR  (11-11-20 @ 18:35)    14.60        PT        Serum Pro-Brain Natriuretic Peptide: 1085 pg/mL (11-10-20 @ 11:57)    Troponin <0.01, CKMB --, CK -- 11-12-20 @ 04:30  Troponin --, CKMB --, CK 83 11-11-20 @ 23:55  Troponin --, CKMB --, CK 45 11-10-20 @ 15:38  Troponin <0.01, CKMB --, CK -- 11-10-20 @ 11:57    Iron --, TIBC --, %Sat -- Ferritin 2998 11-10-20 @ 15:38  Iron --, TIBC --, %Sat -- Ferritin 3080 11-10-20 @ 11:57    Radiology:     < from: Xray Chest 1 View- PORTABLE-Routine (Xray Chest 1 View- PORTABLE-Routine in AM.) (11.12.20 @ 05:33) >  Impression: Low lung volumes. Slightly increased bilateral parenchymal opacities. No pneumothorax.  < end of copied text >   Hospital Day:  2d     Chief Complaint: Patient is a 75y old  Male who presents with a chief complaint of covid-19 (12 Nov 2020 09:48)    HPI:   76 yo male, pmh of AAA s/p repari, htn, hld, cad, presents to ed for sob and severe weakness, Pt was dx with covid-19  10 days ago, today felt more weak and sob, ems found the pt to be hypoxic (10 Nov 2020 13:21)    Consult: 75M w/ PMHx of AAA s/p repair, HTN, HLD, CAD presented to [facility] ED for SOB and severe weakness. He was tested positive COVID-19 about 10 days ago then progressively became weak, SOB and hypoxic. He was transferred to [facility] for COVID19 PNA treatment. He admits to cough, SOB, and constipation. He denies HA, fatigue, fever, chills, dizziness, change in vision, runny nose, sore throat, CP, wheezing, abd pain, nausea, vomiting, diarrhea, blood in stool or urine, and dysuria. Cardiology is being consulted for V-tach episodes.     Past Medical Hx:   Abdominal aortic aneurysm    Essential hypertension    High cholesterol    Myocardial infarction      Past Sx:  S/P aneurysm repair    No significant past surgical history      Allergies:  No Known Allergies      Current Meds:   Standing Meds:  aspirin enteric coated 81 milliGRAM(s) Oral daily  atorvastatin 80 milliGRAM(s) Oral at bedtime  dexAMETHasone  Injectable 6 milliGRAM(s) IV Push daily  enoxaparin Study Injectable () 1 Dose(s) SubCutaneous two times a day  influenza   Vaccine 0.5 milliLiter(s) IntraMuscular once  pantoprazole    Tablet 40 milliGRAM(s) Oral before breakfast  QUEtiapine 25 milliGRAM(s) Oral at bedtime    PRN Meds:    HOME MEDICATIONS:  Aspir 81 oral delayed release tablet: 1 tab(s) orally once a day  Lipitor 80 mg oral tablet: 1 tab(s) orally once a day  lisinopril 10 mg oral tablet: 1 tab(s) orally once a day  metoprolol succinate 50 mg oral tablet, extended release: 1 tab(s) orally once a day      Vital Signs:   T(F): 96.8 (11-12-20 @ 12:00), Max: 98.3 (11-12-20 @ 00:00)  HR: 79 (11-12-20 @ 15:00) (59 - 95)  BP: 144/87 (11-12-20 @ 15:00) (128/102 - 169/85)  RR: 25 (11-12-20 @ 15:00) (22 - 34)  SpO2: 97% (11-12-20 @ 15:00) (92% - 99%)      11-11-20 @ 07:01  -  11-12-20 @ 07:00  --------------------------------------------------------  IN: 550 mL / OUT: 500 mL / NET: 50 mL    11-12-20 @ 07:01  -  11-12-20 @ 15:54  --------------------------------------------------------  IN: 0 mL / OUT: 700 mL / NET: -700 mL        Physical Exam:   GENERAL: Resting on bed  HEENT: NCAT, currently on Bipap machine  CHEST/LUNG: CTAB  HEART: Regular rate and rhythm; s1 s2 appreciated, No murmurs, rubs, or gallops  ABDOMEN: Soft and obese abdomen, Nontender, Nondistended; Bowel sounds present  EXTREMITIES: No LE edema b/l  SKIN: no rashes, no new lesions  NERVOUS SYSTEM:  Alert & Oriented X3      Labs:                         15.6   11.25 )-----------( 220      ( 12 Nov 2020 04:30 )             48.1     Neutophil% 81.6, Lymphocyte% 9.4, Monocyte% 8.4, Bands% 0.4 11-12-20 @ 04:30    12 Nov 2020 04:30    141    |  106    |  38     ----------------------------<  137    5.2     |  26     |  1.2      Ca    9.3        12 Nov 2020 04:30  Mg     2.2       12 Nov 2020 09:34    TPro  6.0    /  Alb  3.1    /  TBili  0.7    /  DBili  x      /  AST  36     /  ALT  29     /  AlkPhos  71     12 Nov 2020 04:30       pTT    --             ----< 1.27 INR  (11-11-20 @ 18:35)    14.60        PT        Serum Pro-Brain Natriuretic Peptide: 1085 pg/mL (11-10-20 @ 11:57)    Troponin <0.01, CKMB --, CK -- 11-12-20 @ 04:30  Troponin --, CKMB --, CK 83 11-11-20 @ 23:55  Troponin --, CKMB --, CK 45 11-10-20 @ 15:38  Troponin <0.01, CKMB --, CK -- 11-10-20 @ 11:57    Iron --, TIBC --, %Sat -- Ferritin 2998 11-10-20 @ 15:38  Iron --, TIBC --, %Sat -- Ferritin 3080 11-10-20 @ 11:57    Radiology:     < from: Xray Chest 1 View- PORTABLE-Routine (Xray Chest 1 View- PORTABLE-Routine in AM.) (11.12.20 @ 05:33) >  Impression: Low lung volumes. Slightly increased bilateral parenchymal opacities. No pneumothorax.  < end of copied text >

## 2020-11-12 NOTE — PROGRESS NOTE ADULT - ASSESSMENT
76 yo male, pmh of AAA s/p repari, htn, hld, cad, presents to ed for sob and severe weakness, Pt was dx with covid-19  10 days ago, today felt more weak and sob, ems found the pt to be hypoxic    #Acute hypoxic respiratory failure 2/2 COVID PNA  - COVID+  - s/p toci 11/11  - RDV not indicated at this time  - BLE VA duplex 11/11 showed no acute DVT  - c/w decadron for 10  - Lovenox dose as per research protocol  - Give 1 dose 40 mg IV lasix  - BiPAP at night. Did not tolerate HiFLO today am  - CXR and inflammatory markers q48h    #Sustained V.Tach  - 1 episode 11/11 ~11:30 pm on tele  - EKG midnight sinus tach with PACs   - f/u cardio consult    #AAA  - c/w ASA, statin    Diet - dash  Activity - as tolerated  DVT ppx - Lovenox dose as per research protocol  GI ppx - protonix  Dispo - CEU

## 2020-11-12 NOTE — PROGRESS NOTE ADULT - ASSESSMENT
74 yo male, pmh of AAA s/p repari, htn, hld, cad, presents to ed for sob and severe weakness, Pt was dx with covid-19  10 days ago, today felt more weak and sob, ems found the pt to be hypoxic     IMPRESSION;  COVID19 with severe/critical  illness. Hypoxemia 95% NRB 15 lit  . CXR >50% opacities.  Pt is in the late immune mediated phase based on the onset of symptoms >10 d ( anti virals / plasma of no benefit )  Serology positive   Cytokine response  Elevation of the inflammatory markers  -procalcitonin of 0.17 not suggestive of a bacterial PNA/infection  -Ddimers 3262  -Ferritin 2998  -CRP 16.49  -    RECOMMENDATIONS;  RDV/ Plasma of no benefit  Dexamethasone 6 mg iv q24h for 10 days 11/10-20 ( or until discharge ) or equivalent glucocorticoid dose may be substituted. Equivalent total dosis of alternative steroids are Methylprednisolone 32 mg and prednisone 40 mg q24h.  Monitor for side effects: hyperglycemia, neurological ( agitation/confusion), adrenal suppression, bacterial and fungal infections  S/p Tocilizumab 400 mg iv x once.11/11.  Ferritin 48h later  Anticoagulation as per team

## 2020-11-12 NOTE — CONSULT NOTE ADULT - ASSESSMENT
75M w/ PMHx of AAA s/p repair, HTN, HLD, CAD presented to [facility] ED for SOB and severe weakness. He was tested positive COVID-19 about 10 days ago then progressively became weak, SOB and hypoxic. He was transferred to [facility] for COVID19 PNA treatment. Cardiology is being consulted for V-tach episodes.    # Acute hypoxemic Respiratory failure due to COVID PNA  # HTN/HLD/CAD  # V-Tach  - Trop negative x2, BNP 1085   75M w/ PMHx of AAA s/p repair, HTN, HLD, CAD presented to [facility] ED for SOB and severe weakness. He was tested positive COVID-19 about 10 days ago then progressively became weak, SOB and hypoxic. He was transferred to [facility] for COVID19 PNA treatment. Cardiology is being consulted for V-tach episodes.    # Acute hypoxemic Respiratory failure due to COVID PNA  # HTN/HLD/CAD  # NSVT  - Trop negative x2, BNP 1085  - start lopressor 12.5mg PO BID, Cont Statin   75M w/ PMHx of AAA s/p repair, HTN, HLD, CAD presented to [facility] ED for SOB and severe weakness. He was tested positive COVID-19 about 10 days ago then progressively became weak, SOB and hypoxic. He was transferred to [facility] for COVID19 PNA treatment. Cardiology is being consulted for V-tach episodes.    # Acute hypoxemic Respiratory failure due to COVID PNA  # HTN/HLD/CAD  # NSVT  - Trop negative x2, BNP 1085  - start lopressor 12.5mg PO BID as HR tolerates, Cont Statin and Aspirin  - will need Echo once recovered from COVID 19 PNA     75M w/ PMHx of AAA s/p repair, HTN, HLD, CAD presented to [facility] ED for SOB and severe weakness. He was tested positive COVID-19 about 10 days ago then progressively became weak, SOB and hypoxic. He was transferred to [facility] for COVID19 PNA treatment. Cardiology is being consulted for V-tach episodes.    # Acute hypoxemic Respiratory failure due to COVID PNA  # HTN/HLD/CAD  # NSVT  - Trop negative x2, BNP 1085  - start lopressor 12.5mg PO if tolerated  - Cont Statin and Aspirin  - Get 2d echo (can obtain limited views)     75M w/ PMHx of AAA s/p repair, HTN, HLD, CAD presented to [facility] ED for SOB and severe weakness. He was tested positive COVID-19 about 10 days ago then progressively became weak, SOB and hypoxic. He was transferred to [facility] for COVID19 PNA treatment. Cardiology is being consulted for V-tach episodes.    # Acute hypoxemic Respiratory failure due to COVID PNA  # HTN/HLD/CAD  # NSVT  - Trop negative x2, BNP 1085  - start lopressor 12.5mg PO if tolerated  - Cont Statin and Aspirin  - Get 2d echo (can obtain limited views - COVID 19 protocol)  - c/w supportive care, treatment for COVID-19 PNA  - monitor oxygenation

## 2020-11-12 NOTE — PROGRESS NOTE ADULT - SUBJECTIVE AND OBJECTIVE BOX
OVERNIGHT EVENTS: events noted, afebrile, ID reviewed    Vital Signs Last 24 Hrs  T(C): 36.8 (12 Nov 2020 00:00), Max: 36.8 (12 Nov 2020 00:00)  T(F): 98.3 (12 Nov 2020 00:00), Max: 98.3 (12 Nov 2020 00:00)  HR: 62 (12 Nov 2020 04:00) (59 - 95)  BP: 156/76 (12 Nov 2020 04:00) (124/73 - 165/85)  BP(mean): 107 (12 Nov 2020 04:00) (72 - 118)  RR: 26 (12 Nov 2020 04:00) (20 - 34)  SpO2: 98% (12 Nov 2020 06:24) (91% - 99%)    PHYSICAL EXAMINATION:    GENERAL: The patient is awake and alert in no apparent distress.     HEENT: Head is normocephalic and atraumatic.    NECK: Supple.    LUNGS: bl crackles    HEART: TAMMIE 3/6    ABDOMEN: Soft, nontender, and nondistended.      EXTREMITIES: Without any cyanosis, clubbing, rash, lesions or edema.    NEUROLOGIC: Grossly intact.    SKIN: No ulceration or induration present.      LABS:                        15.6   11.25 )-----------( 220      ( 12 Nov 2020 04:30 )             48.1     11-12    141  |  106  |  38<H>  ----------------------------<  137<H>  5.2<H>   |  26  |  1.2    Ca    9.3      12 Nov 2020 04:30  Mg     2.1     11-11    TPro  6.0  /  Alb  3.1<L>  /  TBili  0.7  /  DBili  x   /  AST  36  /  ALT  29  /  AlkPhos  71  11-12    PT/INR - ( 11 Nov 2020 18:35 )   PT: 14.60 sec;   INR: 1.27 ratio               CARDIAC MARKERS ( 12 Nov 2020 04:30 )  x     / <0.01 ng/mL / x     / x     / x      CARDIAC MARKERS ( 11 Nov 2020 23:55 )  x     / x     / 83 U/L / x     / x      CARDIAC MARKERS ( 10 Nov 2020 15:38 )  x     / x     / 45 U/L / x     / x      CARDIAC MARKERS ( 10 Nov 2020 11:57 )  x     / <0.01 ng/mL / x     / x     / x            Serum Pro-Brain Natriuretic Peptide: 1085 pg/mL (11-10-20 @ 11:57)      Procalcitonin, Serum: 0.15 ng/mL (11-10-20 @ 15:38)  Procalcitonin, Serum: 0.17 ng/mL (11-10-20 @ 11:57)        11-11-20 @ 07:01  -  11-12-20 @ 06:56  --------------------------------------------------------  IN: 550 mL / OUT: 500 mL / NET: 50 mL        MICROBIOLOGY:      MEDICATIONS  (STANDING):  aspirin enteric coated 81 milliGRAM(s) Oral daily  atorvastatin 80 milliGRAM(s) Oral at bedtime  dexAMETHasone  Injectable 6 milliGRAM(s) IV Push daily  enoxaparin Study Injectable () 1 Dose(s) SubCutaneous two times a day  influenza   Vaccine 0.5 milliLiter(s) IntraMuscular once  pantoprazole    Tablet 40 milliGRAM(s) Oral before breakfast    MEDICATIONS  (PRN):      RADIOLOGY & ADDITIONAL STUDIES:       OVERNIGHT EVENTS: events noted, afebrile, ID reviewed on BIPAP 100%    Vital Signs Last 24 Hrs  T(C): 36.8 (12 Nov 2020 00:00), Max: 36.8 (12 Nov 2020 00:00)  T(F): 98.3 (12 Nov 2020 00:00), Max: 98.3 (12 Nov 2020 00:00)  HR: 62 (12 Nov 2020 04:00) (59 - 95)  BP: 156/76 (12 Nov 2020 04:00) (124/73 - 165/85)  BP(mean): 107 (12 Nov 2020 04:00) (72 - 118)  RR: 26 (12 Nov 2020 04:00) (20 - 34)  SpO2: 98% (12 Nov 2020 06:24) (91% - 99%)    PHYSICAL EXAMINATION:    GENERAL: The patient is awake and alert in no apparent distress.     HEENT: Head is normocephalic and atraumatic.    NECK: Supple.    LUNGS: bl crackles    HEART: TAMMIE 3/6    ABDOMEN: Soft, nontender, and nondistended.      EXTREMITIES: Without any cyanosis, clubbing, rash, lesions or edema.    NEUROLOGIC: Grossly intact.    SKIN: No ulceration or induration present.      LABS:                        15.6   11.25 )-----------( 220      ( 12 Nov 2020 04:30 )             48.1     11-12    141  |  106  |  38<H>  ----------------------------<  137<H>  5.2<H>   |  26  |  1.2    Ca    9.3      12 Nov 2020 04:30  Mg     2.1     11-11    TPro  6.0  /  Alb  3.1<L>  /  TBili  0.7  /  DBili  x   /  AST  36  /  ALT  29  /  AlkPhos  71  11-12    PT/INR - ( 11 Nov 2020 18:35 )   PT: 14.60 sec;   INR: 1.27 ratio               CARDIAC MARKERS ( 12 Nov 2020 04:30 )  x     / <0.01 ng/mL / x     / x     / x      CARDIAC MARKERS ( 11 Nov 2020 23:55 )  x     / x     / 83 U/L / x     / x      CARDIAC MARKERS ( 10 Nov 2020 15:38 )  x     / x     / 45 U/L / x     / x      CARDIAC MARKERS ( 10 Nov 2020 11:57 )  x     / <0.01 ng/mL / x     / x     / x            Serum Pro-Brain Natriuretic Peptide: 1085 pg/mL (11-10-20 @ 11:57)      Procalcitonin, Serum: 0.15 ng/mL (11-10-20 @ 15:38)  Procalcitonin, Serum: 0.17 ng/mL (11-10-20 @ 11:57)        11-11-20 @ 07:01  -  11-12-20 @ 06:56  --------------------------------------------------------  IN: 550 mL / OUT: 500 mL / NET: 50 mL        MICROBIOLOGY:      MEDICATIONS  (STANDING):  aspirin enteric coated 81 milliGRAM(s) Oral daily  atorvastatin 80 milliGRAM(s) Oral at bedtime  dexAMETHasone  Injectable 6 milliGRAM(s) IV Push daily  enoxaparin Study Injectable () 1 Dose(s) SubCutaneous two times a day  influenza   Vaccine 0.5 milliLiter(s) IntraMuscular once  pantoprazole    Tablet 40 milliGRAM(s) Oral before breakfast    MEDICATIONS  (PRN):      RADIOLOGY & ADDITIONAL STUDIES:

## 2020-11-12 NOTE — PROGRESS NOTE ADULT - ASSESSMENT
IMPRESSION:    Acute hypoxic respiratory failure  Sepsis present on admission  multi lobar pneumonia due to covid 19   NO evidence of bacterial superinfection      SUGGEST:      CNS: Avoid CNS depressant    HEENT: Oral care    PULMONARY:  HOB @ 45 degrees.  HHFNC alternate with BIPAP, keep Sao2 92 to 96%, Decadron      CARDIOVASCULAR: i=o.    GI: GI prophylaxis.    OG feeding as tolerated    RENAL:  Follow up lytes.  Correct as needed  daily labs    INFECTIOUS DISEASE: infl markers,  Decadron/ PROCAL per ID    HEMATOLOGICAL:  AC    ENDOCRINE:  Follow up FS.  Insulin protocol if needed.    MUSCULOSKELETAL: bedrest    CEU  poor prognosis IMPRESSION:    Acute hypoxic respiratory failure  Sepsis present on admission  multi lobar pneumonia due to covid 19   NO evidence of bacterial superinfection      SUGGEST:      CNS: Avoid CNS depressant    HEENT: Oral care    PULMONARY:  HOB @ 45 degrees.  HHFNC alternate with BIPAP, keep Sao2 92 to 96%, Decadron      CARDIOVASCULAR: i=o.    GI: GI prophylaxis.    OG feeding as tolerated    RENAL:  Follow up lytes.  Correct as needed  daily labs    INFECTIOUS DISEASE: infl markers,  Decadron/per ID    HEMATOLOGICAL:  AC    ENDOCRINE:  Follow up FS.  Insulin protocol if needed.    MUSCULOSKELETAL: bedrest    CEU  poor prognosis

## 2020-11-12 NOTE — PROGRESS NOTE ADULT - SUBJECTIVE AND OBJECTIVE BOX
MIGUEL ALMODOVAR  75y, Male    All available historical data reviewed    OVERNIGHT EVENTS:  no fevers  95% NRB 15L    ROS:  General: Denies rigors, nightsweats  HEENT: Denies headache, rhinorrhea, sore throat, eye pain  CV: Denies CP, palpitations  PULM: Denies wheezing, hemoptysis  GI: Denies hematemesis, hematochezia, melena  : Denies discharge, hematuria  MSK: Denies arthralgias, myalgias  SKIN: Denies rash, lesions  NEURO: Denies paresthesias, weakness  PSYCH: Denies depression, anxiety    VITALS:  T(F): 96.9, Max: 98.3 (11-12-20 @ 00:00)  HR: 71  BP: 169/85  RR: 28Vital Signs Last 24 Hrs  T(C): 36.1 (12 Nov 2020 08:00), Max: 36.8 (12 Nov 2020 00:00)  T(F): 96.9 (12 Nov 2020 08:00), Max: 98.3 (12 Nov 2020 00:00)  HR: 71 (12 Nov 2020 09:00) (59 - 95)  BP: 169/85 (12 Nov 2020 09:00) (124/73 - 169/85)  BP(mean): 119 (12 Nov 2020 09:00) (72 - 119)  RR: 28 (12 Nov 2020 09:00) (22 - 34)  SpO2: 96% (12 Nov 2020 09:00) (91% - 99%)    TESTS & MEASUREMENTS:                        15.6   11.25 )-----------( 220      ( 12 Nov 2020 04:30 )             48.1     11-12    141  |  106  |  38<H>  ----------------------------<  137<H>  5.2<H>   |  26  |  1.2    Ca    9.3      12 Nov 2020 04:30  Mg     2.2     11-12    TPro  6.0  /  Alb  3.1<L>  /  TBili  0.7  /  DBili  x   /  AST  36  /  ALT  29  /  AlkPhos  71  11-12    LIVER FUNCTIONS - ( 12 Nov 2020 04:30 )  Alb: 3.1 g/dL / Pro: 6.0 g/dL / ALK PHOS: 71 U/L / ALT: 29 U/L / AST: 36 U/L / GGT: x                   RADIOLOGY & ADDITIONAL TESTS:  Personal review of radiological diagnostics performed  Echo and EKG results noted when applicable.     MEDICATIONS:  aspirin enteric coated 81 milliGRAM(s) Oral daily  atorvastatin 80 milliGRAM(s) Oral at bedtime  dexAMETHasone  Injectable 6 milliGRAM(s) IV Push daily  enoxaparin Study Injectable () 1 Dose(s) SubCutaneous two times a day  influenza   Vaccine 0.5 milliLiter(s) IntraMuscular once  pantoprazole    Tablet 40 milliGRAM(s) Oral before breakfast      ANTIBIOTICS:

## 2020-11-13 NOTE — PROGRESS NOTE ADULT - SUBJECTIVE AND OBJECTIVE BOX
OVERNIGHT EVENTS: events noted still on BIPAP 100%, cardio for NSVT, ID reviewed, afebrile    Vital Signs Last 24 Hrs  T(C): 35.7 (13 Nov 2020 06:00), Max: 36.8 (12 Nov 2020 16:00)  T(F): 96.2 (13 Nov 2020 06:00), Max: 98.2 (12 Nov 2020 16:00)  HR: 71 (13 Nov 2020 06:00) (62 - 88)  BP: 160/104 (13 Nov 2020 06:00) (135/84 - 169/85)  BP(mean): 120 (13 Nov 2020 06:00) (101 - 120)  RR: 23 (13 Nov 2020 00:00) (20 - 28)  SpO2: 99% (13 Nov 2020 06:00) (88% - 99%)    PHYSICAL EXAMINATION:    GENERAL: ill looking     HEENT: Head is normocephalic and atraumatic.     NECK: Supple.    LUNGS: bl crackles    HEART: parisa 3/6    ABDOMEN: Soft, nontender, and nondistended.      EXTREMITIES: Without any cyanosis, clubbing, rash, lesions or edema.    NEUROLOGIC: Grossly intact.    SKIN: No ulceration or induration present.      LABS:                        15.6   11.25 )-----------( 220      ( 12 Nov 2020 04:30 )             48.1     11-12    141  |  106  |  38<H>  ----------------------------<  137<H>  5.2<H>   |  26  |  1.2    Ca    9.3      12 Nov 2020 04:30  Mg     2.2     11-12    TPro  6.0  /  Alb  3.1<L>  /  TBili  0.7  /  DBili  x   /  AST  36  /  ALT  29  /  AlkPhos  71  11-12    PT/INR - ( 11 Nov 2020 18:35 )   PT: 14.60 sec;   INR: 1.27 ratio               CARDIAC MARKERS ( 12 Nov 2020 04:30 )  x     / <0.01 ng/mL / x     / x     / x      CARDIAC MARKERS ( 11 Nov 2020 23:55 )  x     / x     / 83 U/L / x     / x            Serum Pro-Brain Natriuretic Peptide: 1085 pg/mL (11-10-20 @ 11:57)      Procalcitonin, Serum: 0.15 ng/mL (11-10-20 @ 15:38)  Procalcitonin, Serum: 0.17 ng/mL (11-10-20 @ 11:57)        11-11-20 @ 07:01  -  11-12-20 @ 07:00  --------------------------------------------------------  IN: 550 mL / OUT: 500 mL / NET: 50 mL    11-12-20 @ 07:01  -  11-13-20 @ 06:59  --------------------------------------------------------  IN: 100 mL / OUT: 1600 mL / NET: -1500 mL        MICROBIOLOGY:      MEDICATIONS  (STANDING):  aspirin enteric coated 81 milliGRAM(s) Oral daily  atorvastatin 80 milliGRAM(s) Oral at bedtime  dexAMETHasone  Injectable 6 milliGRAM(s) IV Push daily  enoxaparin Study Injectable () 1 Dose(s) SubCutaneous two times a day  influenza   Vaccine 0.5 milliLiter(s) IntraMuscular once  metoprolol tartrate 12.5 milliGRAM(s) Oral every 12 hours  pantoprazole    Tablet 40 milliGRAM(s) Oral before breakfast  QUEtiapine 25 milliGRAM(s) Oral at bedtime    MEDICATIONS  (PRN):      RADIOLOGY & ADDITIONAL STUDIES:

## 2020-11-13 NOTE — PROGRESS NOTE ADULT - SUBJECTIVE AND OBJECTIVE BOX
MIGUEL ALMODOVAR  75y, Male    All available historical data reviewed    OVERNIGHT EVENTS:  no fevers  95%NRB15L    ROS:  General: Denies rigors, nightsweats  HEENT: Denies headache, rhinorrhea, sore throat, eye pain  CV: Denies CP, palpitations  PULM: Denies wheezing, hemoptysis  GI: Denies hematemesis, hematochezia, melena  : Denies discharge, hematuria  MSK: Denies arthralgias, myalgias  SKIN: Denies rash, lesions  NEURO: Denies paresthesias, weakness  PSYCH: Denies depression, anxiety    VITALS:  T(F): 96.4, Max: 98.2 (11-12-20 @ 16:00)  HR: 68  BP: 151/77  RR: 20Vital Signs Last 24 Hrs  T(C): 35.8 (13 Nov 2020 12:00), Max: 36.8 (12 Nov 2020 16:00)  T(F): 96.4 (13 Nov 2020 12:00), Max: 98.2 (12 Nov 2020 16:00)  HR: 68 (13 Nov 2020 12:00) (62 - 82)  BP: 151/77 (13 Nov 2020 12:00) (122/87 - 167/88)  BP(mean): 83 (13 Nov 2020 12:00) (83 - 120)  RR: 20 (13 Nov 2020 12:00) (18 - 28)  SpO2: 96% (13 Nov 2020 12:00) (88% - 99%)    TESTS & MEASUREMENTS:                        15.9   12.12 )-----------( 243      ( 13 Nov 2020 06:30 )             48.8     11-13    141  |  108  |  43<H>  ----------------------------<  126<H>  4.4   |  22  |  1.1    Ca    8.8      13 Nov 2020 06:30  Mg     2.4     11-13    TPro  5.9<L>  /  Alb  3.2<L>  /  TBili  0.9  /  DBili  x   /  AST  35  /  ALT  28  /  AlkPhos  70  11-13    LIVER FUNCTIONS - ( 13 Nov 2020 06:30 )  Alb: 3.2 g/dL / Pro: 5.9 g/dL / ALK PHOS: 70 U/L / ALT: 28 U/L / AST: 35 U/L / GGT: x                   RADIOLOGY & ADDITIONAL TESTS:  Personal review of radiological diagnostics performed  Echo and EKG results noted when applicable.     MEDICATIONS:  aspirin enteric coated 81 milliGRAM(s) Oral daily  atorvastatin 80 milliGRAM(s) Oral at bedtime  dexAMETHasone  Injectable 6 milliGRAM(s) IV Push daily  enoxaparin Study Injectable () 1 Dose(s) SubCutaneous two times a day  influenza   Vaccine 0.5 milliLiter(s) IntraMuscular once  metoprolol tartrate 12.5 milliGRAM(s) Oral every 12 hours  pantoprazole    Tablet 40 milliGRAM(s) Oral before breakfast  QUEtiapine 25 milliGRAM(s) Oral at bedtime      ANTIBIOTICS:

## 2020-11-13 NOTE — PROGRESS NOTE ADULT - SUBJECTIVE AND OBJECTIVE BOX
SUBJECTIVE:  Patient is a 75y old Male who presents with a chief complaint of covid-19 (13 Nov 2020 06:59)   Currently admitted to medicine with the primary diagnosis of COVID-19     Today is hospital day 3d. There are no new issues or overnight events.       HPI  HPI:   76 yo male, pmh of AAA s/p repari, htn, hld, cad, presents to ed for sob and severe weakness, Pt was dx with covid-19  10 days ago, today felt more weak and sob, ems found the pt to be hypoxic (10 Nov 2020 13:21)      PAST MEDICAL & SURGICAL HISTORY  Abdominal aortic aneurysm    Essential hypertension    High cholesterol    Myocardial infarction    S/P aneurysm repair      SOCIAL HISTORY:  Negative for smoking/alcohol/drug use.     ALLERGIES:  No Known Allergies    MEDICATIONS:  HOME MEDICATIONS  Aspir 81 oral delayed release tablet: 1 tab(s) orally once a day  Lipitor 80 mg oral tablet: 1 tab(s) orally once a day  lisinopril 10 mg oral tablet: 1 tab(s) orally once a day  metoprolol succinate 50 mg oral tablet, extended release: 1 tab(s) orally once a day  oxycodone-acetaminophen 5 mg-325 mg oral tablet: 1 tab(s) orally every 8 hours MDD:3    STANDING MEDICATIONS  aspirin enteric coated 81 milliGRAM(s) Oral daily  atorvastatin 80 milliGRAM(s) Oral at bedtime  dexAMETHasone  Injectable 6 milliGRAM(s) IV Push daily  enoxaparin Study Injectable () 1 Dose(s) SubCutaneous two times a day  influenza   Vaccine 0.5 milliLiter(s) IntraMuscular once  metoprolol tartrate 12.5 milliGRAM(s) Oral every 12 hours  pantoprazole    Tablet 40 milliGRAM(s) Oral before breakfast  QUEtiapine 25 milliGRAM(s) Oral at bedtime    PRN MEDICATIONS    VITALS:   ICU Vital Signs Last 24 Hrs  T(C): 36 (13 Nov 2020 08:00), Max: 36.8 (12 Nov 2020 16:00)  T(F): 96.8 (13 Nov 2020 08:00), Max: 98.2 (12 Nov 2020 16:00)  HR: 66 (13 Nov 2020 08:00) (62 - 88)  BP: 138/88 (13 Nov 2020 08:00) (135/84 - 169/85)  BP(mean): 106 (13 Nov 2020 08:00) (99 - 120)  ABP: --  ABP(mean): --  RR: 18 (13 Nov 2020 08:00) (18 - 28)  SpO2: 99% (13 Nov 2020 08:00) (88% - 99%)    LABS:                        15.9   12.12 )-----------( 243      ( 13 Nov 2020 06:30 )             48.8     11-13    141  |  108  |  43<H>  ----------------------------<  126<H>  4.4   |  22  |  1.1    Ca    8.8      13 Nov 2020 06:30  Mg     2.4     11-13    TPro  5.9<L>  /  Alb  3.2<L>  /  TBili  0.9  /  DBili  x   /  AST  35  /  ALT  28  /  AlkPhos  70  11-13    PT/INR - ( 11 Nov 2020 18:35 )   PT: 14.60 sec;   INR: 1.27 ratio             I&O's Detail    12 Nov 2020 07:01  -  13 Nov 2020 07:00  --------------------------------------------------------  IN:    Oral Fluid: 100 mL  Total IN: 100 mL    OUT:    Voided (mL): 1600 mL  Total OUT: 1600 mL    Total NET: -1500 mL    CARDIAC MARKERS ( 12 Nov 2020 04:30 )  x     / <0.01 ng/mL / x     / x     / x      CARDIAC MARKERS ( 11 Nov 2020 23:55 )  x     / x     / 83 U/L / x     / x        PHYSICAL EXAM:  GEN: No acute distress  HEENT: Normocephalic  NECK: Supple  LUNGS: Decreased breathe sounds  HEART: Regular  ABD: Soft, non-tender, non-distended.  EXT: NE/2+PP  NEURO: AAOX3  PSYCH: Appropriate mood, responds appropriately

## 2020-11-13 NOTE — PROGRESS NOTE ADULT - ASSESSMENT
74 yo male, pmh of AAA s/p repari, htn, hld, cad, presents to ed for sob and severe weakness, Pt was dx with covid-19  10 days ago, today felt more weak and sob, ems found the pt to be hypoxic    #Acute hypoxic respiratory failure 2/2 COVID PNA  - COVID+  - s/p toci 11/11  - RDV not indicated at this time  - BLE VA duplex 11/11 showed no acute DVT  - c/w decadron for 10  - Lovenox dose as per research protocol  - BiPAP at night. Desats on HiFLO with minimal exertion  - CXR and inflammatory markers q48h    #Sustained V.Tach  - 1 episode 11/11 ~11:30 pm on tele  - EKG midnight sinus tach with PACs   - Start lopressor 12.5 mg BID  - TTE when respiratory fx stable    #AAA  - c/w ASA, statin    Diet - dash  Activity - as tolerated  DVT ppx - Lovenox dose as per research protocol  GI ppx - protonix  Dispo - CEU

## 2020-11-13 NOTE — PROGRESS NOTE ADULT - ASSESSMENT
IMPRESSION:    Acute hypoxic respiratory failure  Sepsis present on admission  multi lobar pneumonia due to covid 19   NO evidence of bacterial superinfection      SUGGEST:      CNS: Avoid CNS depressant    HEENT: Oral care    PULMONARY:  HOB @ 45 degrees.  HHFNC alternate with BIPAP, keep Sao2 92 to 96%, Decadron      CARDIOVASCULAR: lopressor, IV lasix today    GI: GI prophylaxis.    OG feeding as tolerated    RENAL:  Follow up lytes.  Correct as needed  daily labs    INFECTIOUS DISEASE: infl markers,  Decadron/per ID    HEMATOLOGICAL:  AC    ENDOCRINE:  Follow up FS.  Insulin protocol if needed.    MUSCULOSKELETAL: bedrest    CEU  poor prognosis

## 2020-11-13 NOTE — PROGRESS NOTE ADULT - ASSESSMENT
76 yo male, pmh of AAA s/p repari, htn, hld, cad, presents to ed for sob and severe weakness, Pt was dx with covid-19  10 days ago, today felt more weak and sob, ems found the pt to be hypoxic     IMPRESSION;  COVID19 with severe/critical  illness. Hypoxemia 95% NRB 15 lit  . CXR >50% opacities.  Pt is in the late immune/inflammatory  mediated phase based on the onset of symptoms >10 d ( anti virals / plasma of no benefit )  Serology positive   Cytokine response  Elevation of the inflammatory markers  -procalcitonin of 0.17 not suggestive of a bacterial PNA/infection  -Ddimers 3262  -Ferritin 2998  -CRP 16.49  -    RECOMMENDATIONS;  RDV/ Plasma of no benefit  Dexamethasone 6 mg iv q24h for 10 days 11/10-20 ( or until discharge ) or equivalent glucocorticoid dose may be substituted. Equivalent total dosis of alternative steroids are Methylprednisolone 32 mg and prednisone 40 mg q24h.  Monitor for side effects: hyperglycemia, neurological ( agitation/confusion), adrenal suppression, bacterial and fungal infections  S/p Tocilizumab 400 mg iv x once.11/11.  Ferritin 48h later  Anticoagulation as per team

## 2020-11-14 NOTE — PROGRESS NOTE ADULT - SUBJECTIVE AND OBJECTIVE BOX
OVERNIGHT EVENTS: events noted, CXR/ echo reviewed, afebrile    Vital Signs Last 24 Hrs  T(C): 37.1 (14 Nov 2020 04:00), Max: 37.1 (14 Nov 2020 04:00)  T(F): 98.8 (14 Nov 2020 04:00), Max: 98.8 (14 Nov 2020 04:00)  HR: 54 (14 Nov 2020 04:00) (54 - 84)  BP: 183/94 (14 Nov 2020 04:00) (122/87 - 183/94)  BP(mean): 119 (13 Nov 2020 19:00) (83 - 127)  RR: 24 (14 Nov 2020 04:00) (17 - 24)  SpO2: 96% (14 Nov 2020 04:00) (91% - 99%)    PHYSICAL EXAMINATION:    GENERAL: ill looking    HEENT: Head is normocephalic and atraumatic.    NECK: Supple.    LUNGS: bl crackles    HEART: TAMMIE 3/6    ABDOMEN: Soft, nontender, and nondistended.      EXTREMITIES: Without any cyanosis, clubbing, rash, lesions or edema.    NEUROLOGIC: Grossly intact.    SKIN: No ulceration or induration present.      LABS:                        15.9   12.12 )-----------( 243      ( 13 Nov 2020 06:30 )             48.8     11-13    141  |  108  |  43<H>  ----------------------------<  126<H>  4.4   |  22  |  1.1    Ca    8.8      13 Nov 2020 06:30  Mg     2.4     11-13    TPro  5.9<L>  /  Alb  3.2<L>  /  TBili  0.9  /  DBili  x   /  AST  35  /  ALT  28  /  AlkPhos  70  11-13 11-13-20 @ 07:01  -  11-14-20 @ 07:00  --------------------------------------------------------  IN: 480 mL / OUT: 800 mL / NET: -320 mL        MICROBIOLOGY:      MEDICATIONS  (STANDING):  aspirin enteric coated 81 milliGRAM(s) Oral daily  atorvastatin 80 milliGRAM(s) Oral at bedtime  dexAMETHasone  Injectable 6 milliGRAM(s) IV Push daily  enoxaparin Study Injectable () 1 Dose(s) SubCutaneous two times a day  influenza   Vaccine 0.5 milliLiter(s) IntraMuscular once  lisinopril 10 milliGRAM(s) Oral daily  metoprolol tartrate 12.5 milliGRAM(s) Oral every 12 hours  pantoprazole    Tablet 40 milliGRAM(s) Oral before breakfast  QUEtiapine 25 milliGRAM(s) Oral at bedtime    MEDICATIONS  (PRN):      RADIOLOGY & ADDITIONAL STUDIES:         OVERNIGHT EVENTS: events noted, CXR/ echo reviewed, afebrile, still on HHFNC 80%    Vital Signs Last 24 Hrs  T(C): 37.1 (14 Nov 2020 04:00), Max: 37.1 (14 Nov 2020 04:00)  T(F): 98.8 (14 Nov 2020 04:00), Max: 98.8 (14 Nov 2020 04:00)  HR: 54 (14 Nov 2020 04:00) (54 - 84)  BP: 183/94 (14 Nov 2020 04:00) (122/87 - 183/94)  BP(mean): 119 (13 Nov 2020 19:00) (83 - 127)  RR: 24 (14 Nov 2020 04:00) (17 - 24)  SpO2: 96% (14 Nov 2020 04:00) (91% - 99%)    PHYSICAL EXAMINATION:    GENERAL: ill looking    HEENT: Head is normocephalic and atraumatic.    NECK: Supple.    LUNGS: bl crackles    HEART: TAMMIE 3/6    ABDOMEN: Soft, nontender, and nondistended.      EXTREMITIES: Without any cyanosis, clubbing, rash, lesions or edema.    NEUROLOGIC: Grossly intact.    SKIN: No ulceration or induration present.      LABS:                        15.9   12.12 )-----------( 243      ( 13 Nov 2020 06:30 )             48.8     11-13    141  |  108  |  43<H>  ----------------------------<  126<H>  4.4   |  22  |  1.1    Ca    8.8      13 Nov 2020 06:30  Mg     2.4     11-13    TPro  5.9<L>  /  Alb  3.2<L>  /  TBili  0.9  /  DBili  x   /  AST  35  /  ALT  28  /  AlkPhos  70  11-13 11-13-20 @ 07:01  -  11-14-20 @ 07:00  --------------------------------------------------------  IN: 480 mL / OUT: 800 mL / NET: -320 mL        MICROBIOLOGY:      MEDICATIONS  (STANDING):  aspirin enteric coated 81 milliGRAM(s) Oral daily  atorvastatin 80 milliGRAM(s) Oral at bedtime  dexAMETHasone  Injectable 6 milliGRAM(s) IV Push daily  enoxaparin Study Injectable () 1 Dose(s) SubCutaneous two times a day  influenza   Vaccine 0.5 milliLiter(s) IntraMuscular once  lisinopril 10 milliGRAM(s) Oral daily  metoprolol tartrate 12.5 milliGRAM(s) Oral every 12 hours  pantoprazole    Tablet 40 milliGRAM(s) Oral before breakfast  QUEtiapine 25 milliGRAM(s) Oral at bedtime    MEDICATIONS  (PRN):      RADIOLOGY & ADDITIONAL STUDIES:

## 2020-11-14 NOTE — PROGRESS NOTE ADULT - ASSESSMENT
IMPRESSION:    Acute hypoxic respiratory failure  Sepsis present on admission  multi lobar pneumonia due to covid 19   NO evidence of bacterial superinfection  NSVT ( Echo 40%)      SUGGEST:      CNS: Avoid CNS depressant    HEENT: Oral care    PULMONARY:  HOB @ 45 degrees.  HHFNC alternate with BIPAP, keep Sao2 92 to 96%, Decadron      CARDIOVASCULAR: lopressor, IV lasix daily, increase lisinopril    GI: GI prophylaxis.    OG feeding as tolerated    RENAL:  Follow up lytes.  Correct as needed    INFECTIOUS DISEASE: infl markers,  Decadron/per ID    HEMATOLOGICAL:  AC    ENDOCRINE:  Follow up FS.  Insulin protocol if needed.    MUSCULOSKELETAL: bedrest    CEU  poor prognosis

## 2020-11-14 NOTE — CHART NOTE - NSCHARTNOTEFT_GEN_A_CORE
Case discussed with attending physician in AM round. Patient was examined at bedside with attending physician.    Plan:  - Keep HF setting same, can titrate down FiO2 as tolerated  - Continue Lasix IV 40mg q12hrs, monitor I/O  - Case discussed with attending physician in AM round. Patient was examined at bedside with attending physician.    Plan:  - Keep HF setting same, can titrate down FiO2 as tolerated; BIPAP at night  - Continue Lasix IV 40mg q12hrs, monitor I/O  - BP better controlled with lisinopril 5mg, can increase if needed; monitor K  - Continue metoprolol 12.5mg q12hrs for NSVT  - FULL CODE Case discussed with attending physician in AM round. Patient was examined at bedside with attending physician.    Plan:  - Keep HF setting same, can titrate down FiO2 as tolerated; BIPAP at night; CXR improved from 11/12  - Continue Lasix IV 40mg q12hrs, monitor I/O  - BP better controlled with lisinopril 5mg, can increase if needed; monitor K  - Continue metoprolol 12.5mg q12hrs for NSVT  - FULL CODE

## 2020-11-15 NOTE — PROGRESS NOTE ADULT - ASSESSMENT
IMPRESSION:    Acute hypoxic respiratory failure slowly improving  Sepsis present on admission  multi lobar pneumonia due to covid 19   NO evidence of bacterial superinfection  NSVT ( Echo 40%)      SUGGEST:      CNS: Avoid CNS depressant    HEENT: Oral care    PULMONARY:  HOB @ 45 degrees.  HHFNC alternate with BIPAP, keep Sao2 92 to 96%, Decadron      CARDIOVASCULAR: lopressor, IV lasix daily keep neg balance, increase lisinopril    GI: GI prophylaxis.    OG feeding as tolerated    RENAL:  Follow up lytes.  Correct as needed    INFECTIOUS DISEASE: infl markers,  Decadron/per ID    HEMATOLOGICAL:  AC    ENDOCRINE:  Follow up FS.  Insulin protocol if needed.    MUSCULOSKELETAL: bedrest    CEU  poor prognosis

## 2020-11-15 NOTE — PROGRESS NOTE ADULT - SUBJECTIVE AND OBJECTIVE BOX
SUBJECTIVE:  Patient is a 75y old Male who presents with a chief complaint of covid-19 (15 Nov 2020 07:00)   Currently admitted to medicine with the primary diagnosis of COVID-19     Today is hospital day 5d. There are no new issues or overnight events.       HPI  HPI:   74 yo male, pmh of AAA s/p repari, htn, hld, cad, presents to ed for sob and severe weakness, Pt was dx with covid-19  10 days ago, today felt more weak and sob, ems found the pt to be hypoxic (10 Nov 2020 13:21)      PAST MEDICAL & SURGICAL HISTORY  Abdominal aortic aneurysm    Essential hypertension    High cholesterol    Myocardial infarction    S/P aneurysm repair      SOCIAL HISTORY:  Negative for smoking/alcohol/drug use.     ALLERGIES:  No Known Allergies    MEDICATIONS:  HOME MEDICATIONS  Aspir 81 oral delayed release tablet: 1 tab(s) orally once a day  Lipitor 80 mg oral tablet: 1 tab(s) orally once a day  lisinopril 10 mg oral tablet: 1 tab(s) orally once a day  metoprolol succinate 50 mg oral tablet, extended release: 1 tab(s) orally once a day  oxycodone-acetaminophen 5 mg-325 mg oral tablet: 1 tab(s) orally every 8 hours MDD:3    STANDING MEDICATIONS  aspirin enteric coated 81 milliGRAM(s) Oral daily  atorvastatin 80 milliGRAM(s) Oral at bedtime  dexAMETHasone  Injectable 6 milliGRAM(s) IV Push daily  enoxaparin Study Injectable () 1 Dose(s) SubCutaneous two times a day  furosemide   Injectable 40 milliGRAM(s) IV Push every 12 hours  influenza   Vaccine 0.5 milliLiter(s) IntraMuscular once  lisinopril 10 milliGRAM(s) Oral once  metoprolol tartrate 12.5 milliGRAM(s) Oral every 12 hours  pantoprazole    Tablet 40 milliGRAM(s) Oral before breakfast  QUEtiapine 25 milliGRAM(s) Oral at bedtime    PRN MEDICATIONS    VITALS:   ICU Vital Signs Last 24 Hrs  T(C): 36.5 (15 Nov 2020 05:41), Max: 36.7 (15 Nov 2020 00:00)  T(F): 97.7 (15 Nov 2020 05:41), Max: 98 (15 Nov 2020 00:00)  HR: 72 (15 Nov 2020 08:00) (58 - 98)  BP: 135/78 (15 Nov 2020 08:00) (135/78 - 199/93)  BP(mean): 80 (15 Nov 2020 08:00) (80 - 116)  ABP: --  ABP(mean): --  RR: 29 (15 Nov 2020 08:00) (17 - 29)  SpO2: 92% (15 Nov 2020 08:00) (92% - 99%)    LABS:                        15.6   14.61 )-----------( 252      ( 14 Nov 2020 07:17 )             48.4     11-14    139  |  107  |  40<H>  ----------------------------<  112<H>  4.6   |  20  |  1.1    Ca    8.6      14 Nov 2020 07:17  Mg     2.3     11-14    TPro  5.5<L>  /  Alb  2.9<L>  /  TBili  0.8  /  DBili  x   /  AST  60<H>  /  ALT  58<H>  /  AlkPhos  77  11-14        I&O's Detail    14 Nov 2020 07:01  -  15 Nov 2020 07:00  --------------------------------------------------------  IN:    Oral Fluid: 600 mL  Total IN: 600 mL    OUT:    Voided (mL): 1850 mL  Total OUT: 1850 mL    Total NET: -1250 mL    PHYSICAL EXAM:  GEN: No acute distress  HEENT: Normocephalic  NECK: Supple  LUNGS: Decreased breathe sounds  HEART: Regular  ABD: Soft, non-tender, non-distended.  EXT: NE/2+PP  NEURO: AAOX3  PSYCH: Appropriate mood, responds appropriately

## 2020-11-15 NOTE — PROGRESS NOTE ADULT - ASSESSMENT
76 yo male, pmh of AAA s/p repari, htn, hld, cad, presents to ed for sob and severe weakness, Pt was dx with covid-19  10 days ago, today felt more weak and sob, ems found the pt to be hypoxic     IMPRESSION;  COVID19 with severe/critical  illness. Hypoxemia 95% NRB 15 lit  . CXR >50% opacities.  Pt is in the late immune/inflammatory  mediated phase based on the onset of symptoms >10 d ( anti virals / plasma of no benefit )  Serology positive   Cytokine response  Interleukin 6: 41.0 pg/mL (11-10-20 @ 15:38)  #Cytokine Release Syndrome   D-Dimer Assay, Quantitative: 698 ng/mL DDU (11-15-20 @ 10:31) <--D-Dimer Assay, Quantitative: 979 ng/mL DDU (11-13-20 @ 06:30)  Ferritin, Serum: 1516 ng/mL (11-13-20 @ 06:30)  C-Reactive Protein, Serum: 3.14 mg/dL (11-13-20 @ 06:30)      RECOMMENDATIONS;  RDV/ Plasma of no benefit  Dexamethasone 6 mg iv q24h for 10 days 11/10-20   S/p Tocilizumab 400 mg iv x once.11/11.  Ferritin 48h later  CXR improving  Anticoagulation as per team

## 2020-11-15 NOTE — PROGRESS NOTE ADULT - ASSESSMENT
76 yo male, pmh of AAA s/p repari, htn, hld, cad, presents to ed for sob and severe weakness, Pt was dx with covid-19  10 days ago, today felt more weak and sob, ems found the pt to be hypoxic    #Acute hypoxic respiratory failure 2/2 COVID PNA  - COVID+  - s/p toci 11/11  - RDV not indicated at this time  - BLE VA duplex 11/11 showed no acute DVT  - d-dimer 3262 > 979  - CRP 16.49 > 3.14  - Ferritin 3080 > 1516  - c/w decadron  - Lovenox dose as per research protocol  - Alt HiFLO with BiPAP as tolerated  - CXR and inflammatory markers q48h    #Sustained V.Tach  - 1 episode 11/11 ~11:30 pm on tele  - EKG midnight sinus tach with PACs   - Start lopressor 12.5 mg BID  - TTE when respiratory fx stable    #AAA  - c/w ASA, statin    Diet - dash  Activity - as tolerated  DVT ppx - Lovenox dose as per research protocol  GI ppx - protonix  Dispo - CEU

## 2020-11-15 NOTE — PROGRESS NOTE ADULT - SUBJECTIVE AND OBJECTIVE BOX
OVERNIGHT EVENTS: events noted, afebrile, CXR reviewed, feels better    Vital Signs Last 24 Hrs  T(C): 36.5 (15 Nov 2020 05:41), Max: 36.7 (15 Nov 2020 00:00)  T(F): 97.7 (15 Nov 2020 05:41), Max: 98 (15 Nov 2020 00:00)  HR: 62 (15 Nov 2020 05:41) (56 - 98)  BP: 182/90 (15 Nov 2020 05:41) (136/71 - 199/93)  BP(mean): 114 (14 Nov 2020 19:00) (90 - 116)  RR: 20 (15 Nov 2020 05:41) (17 - 20)  SpO2: 96% (15 Nov 2020 04:00) (92% - 99%)    PHYSICAL EXAMINATION:    GENERAL: The patient is awake and alert in no apparent distress.     HEENT: Head is normocephalic and atraumatic.     NECK: Supple.    LUNGS: bl crackles    HEART: parisa 3/6    ABDOMEN: Soft, nontender, and nondistended.      EXTREMITIES: Without any cyanosis, clubbing, rash, lesions or edema.    NEUROLOGIC: Grossly intact.    SKIN: No ulceration or induration present.      LABS:                        15.6   14.61 )-----------( 252      ( 14 Nov 2020 07:17 )             48.4     11-14    139  |  107  |  40<H>  ----------------------------<  112<H>  4.6   |  20  |  1.1    Ca    8.6      14 Nov 2020 07:17  Mg     2.3     11-14    TPro  5.5<L>  /  Alb  2.9<L>  /  TBili  0.8  /  DBili  x   /  AST  60<H>  /  ALT  58<H>  /  AlkPhos  77  11-14 11-14-20 @ 07:01  -  11-15-20 @ 07:00  --------------------------------------------------------  IN: 600 mL / OUT: 1850 mL / NET: -1250 mL        MICROBIOLOGY:      MEDICATIONS  (STANDING):  aspirin enteric coated 81 milliGRAM(s) Oral daily  atorvastatin 80 milliGRAM(s) Oral at bedtime  dexAMETHasone  Injectable 6 milliGRAM(s) IV Push daily  enoxaparin Study Injectable () 1 Dose(s) SubCutaneous two times a day  furosemide   Injectable 40 milliGRAM(s) IV Push every 12 hours  influenza   Vaccine 0.5 milliLiter(s) IntraMuscular once  lisinopril 10 milliGRAM(s) Oral daily  metoprolol tartrate 12.5 milliGRAM(s) Oral every 12 hours  pantoprazole    Tablet 40 milliGRAM(s) Oral before breakfast  QUEtiapine 25 milliGRAM(s) Oral at bedtime    MEDICATIONS  (PRN):      RADIOLOGY & ADDITIONAL STUDIES:         OVERNIGHT EVENTS: events noted, afebrile, CXR reviewed, still on HHFNC 80%    Vital Signs Last 24 Hrs  T(C): 36.5 (15 Nov 2020 05:41), Max: 36.7 (15 Nov 2020 00:00)  T(F): 97.7 (15 Nov 2020 05:41), Max: 98 (15 Nov 2020 00:00)  HR: 62 (15 Nov 2020 05:41) (56 - 98)  BP: 182/90 (15 Nov 2020 05:41) (136/71 - 199/93)  BP(mean): 114 (14 Nov 2020 19:00) (90 - 116)  RR: 20 (15 Nov 2020 05:41) (17 - 20)  SpO2: 96% (15 Nov 2020 04:00) (92% - 99%)    PHYSICAL EXAMINATION:    GENERAL: The patient is awake and alert in no apparent distress.     HEENT: Head is normocephalic and atraumatic.     NECK: Supple.    LUNGS: bl crackles    HEART: parisa 3/6    ABDOMEN: Soft, nontender, and nondistended.      EXTREMITIES: Without any cyanosis, clubbing, rash, lesions or edema.    NEUROLOGIC: Grossly intact.    SKIN: No ulceration or induration present.      LABS:                        15.6   14.61 )-----------( 252      ( 14 Nov 2020 07:17 )             48.4     11-14    139  |  107  |  40<H>  ----------------------------<  112<H>  4.6   |  20  |  1.1    Ca    8.6      14 Nov 2020 07:17  Mg     2.3     11-14    TPro  5.5<L>  /  Alb  2.9<L>  /  TBili  0.8  /  DBili  x   /  AST  60<H>  /  ALT  58<H>  /  AlkPhos  77  11-14 11-14-20 @ 07:01  -  11-15-20 @ 07:00  --------------------------------------------------------  IN: 600 mL / OUT: 1850 mL / NET: -1250 mL        MICROBIOLOGY:      MEDICATIONS  (STANDING):  aspirin enteric coated 81 milliGRAM(s) Oral daily  atorvastatin 80 milliGRAM(s) Oral at bedtime  dexAMETHasone  Injectable 6 milliGRAM(s) IV Push daily  enoxaparin Study Injectable () 1 Dose(s) SubCutaneous two times a day  furosemide   Injectable 40 milliGRAM(s) IV Push every 12 hours  influenza   Vaccine 0.5 milliLiter(s) IntraMuscular once  lisinopril 10 milliGRAM(s) Oral daily  metoprolol tartrate 12.5 milliGRAM(s) Oral every 12 hours  pantoprazole    Tablet 40 milliGRAM(s) Oral before breakfast  QUEtiapine 25 milliGRAM(s) Oral at bedtime    MEDICATIONS  (PRN):      RADIOLOGY & ADDITIONAL STUDIES:

## 2020-11-15 NOTE — PROGRESS NOTE ADULT - SUBJECTIVE AND OBJECTIVE BOX
MIGUEL ALMODOVAR  75y, Male  Allergy: No Known Allergies      LOS  5d    CHIEF COMPLAINT: covid-19 (15 Nov 2020 09:07)      INTERVAL EVENTS/HPI  - HFNC  - T(F): , Max: 98.8 (11-15-20 @ 08:00)  - WBC Count: 17.50 (11-15-20 @ 10:31) uptrending   WBC Count: 14.61 (11-14-20 @ 07:17)  - Creatinine, Serum: 1.3 (11-15-20 @ 10:31)  Creatinine, Serum: 1.1 (11-14-20 @ 07:17)           VITALS:  T(F): 98.5, Max: 98.8 (11-15-20 @ 08:00)  HR: 74  BP: 108/62  RR: 38Vital Signs Last 24 Hrs  T(C): 36.9 (15 Nov 2020 12:00), Max: 37.1 (15 Nov 2020 08:00)  T(F): 98.5 (15 Nov 2020 12:00), Max: 98.8 (15 Nov 2020 08:00)  HR: 74 (15 Nov 2020 15:00) (62 - 98)  BP: 108/62 (15 Nov 2020 15:00) (105/62 - 199/93)  BP(mean): 80 (15 Nov 2020 15:00) (79 - 115)  RR: 38 (15 Nov 2020 15:00) (17 - 40)  SpO2: 91% (15 Nov 2020 15:00) (90% - 96%)        FH: Non-contributory  Social Hx: Non-contributory    TESTS & MEASUREMENTS:                        16.6   17.50 )-----------( 298      ( 15 Nov 2020 10:31 )             50.7     11-15    140  |  104  |  43<H>  ----------------------------<  176<H>  4.0   |  22  |  1.3    Ca    8.9      15 Nov 2020 10:31  Mg     2.2     11-15    TPro  5.8<L>  /  Alb  3.3<L>  /  TBili  0.8  /  DBili  x   /  AST  33  /  ALT  54<H>  /  AlkPhos  80  11-15    eGFR if Non African American: 53 mL/min/1.73M2 (11-15-20 @ 10:31)  eGFR if : 61 mL/min/1.73M2 (11-15-20 @ 10:31)    LIVER FUNCTIONS - ( 15 Nov 2020 10:31 )  Alb: 3.3 g/dL / Pro: 5.8 g/dL / ALK PHOS: 80 U/L / ALT: 54 U/L / AST: 33 U/L / GGT: x                 Lactate, Blood: 1.6 mmol/L (11-10-20 @ 15:38)      INFECTIOUS DISEASES TESTING  Procalcitonin, Serum: 0.15 (11-10-20 @ 15:38)  Procalcitonin, Serum: 0.17 (11-10-20 @ 11:57)  COVID-19 PCR: Detected (11-10-20 @ 11:57)      INFLAMMATORY MARKERS  C-Reactive Protein, Serum: 3.14 mg/dL (11-13-20 @ 06:30)  C-Reactive Protein, Serum: 9.74 mg/dL (11-11-20 @ 18:35)  C-Reactive Protein, Serum: 15.97 mg/dL (11-10-20 @ 15:38)  Interleukin 6: 41.0 pg/mL (11-10-20 @ 15:38)      RADIOLOGY & ADDITIONAL TESTS:  I have personally reviewed the last available Chest xray  CXR      CT      CARDIOLOGY TESTING  12 Lead ECG:   Ventricular Rate 82 BPM    Atrial Rate 82 BPM    P-R Interval 196 ms    QRS Duration 130 ms    Q-T Interval 418 ms    QTC Calculation(Bazett) 488 ms    P Axis 21 degrees    R Axis -20 degrees    T Axis 144 degrees    Diagnosis Line Sinus rhythm with Premature atrial complexes with Aberrant conduction  Left ventricular hypertrophy with QRS widening and repolarization abnormality  Inferior infarct , age undetermined  Abnormal ECG    Confirmed by JOSE QUIROZ MD ([id number]) on 11/12/2020 10:58:24 PM (11-12-20 @ 09:19)  12 Lead ECG:   Ventricular Rate 67 BPM    Atrial Rate 67 BPM    P-R Interval 198 ms    QRS Duration 134 ms    Q-T Interval 454 ms    QTC Calculation(Bazett) 479 ms    P Axis 49 degrees    R Axis -17 degrees    T Axis 155 degrees    Diagnosis Line Sinus rhythm with Premature atrial complexes  Left ventricular hypertrophy with QRS widening and repolarization abnormality  Inferior infarct , age undetermined  Abnormal ECG    Confirmed by Herbie Thibodeaux ([id number]) on 11/12/2020 8:53:04 AM (11-12-20 @ 00:17)      MEDICATIONS  aspirin enteric coated 81 Oral daily  atorvastatin 80 Oral at bedtime  dexAMETHasone  Injectable 6 IV Push daily  enoxaparin Study Injectable () 1 SubCutaneous two times a day  furosemide   Injectable 40 IV Push every 12 hours  influenza   Vaccine 0.5 IntraMuscular once  metoprolol tartrate 12.5 Oral every 12 hours  pantoprazole    Tablet 40 Oral before breakfast  QUEtiapine 25 Oral at bedtime      WEIGHT  Weight (kg): 88.5 (11-10-20 @ 11:33)  Creatinine, Serum: 1.3 mg/dL (11-15-20 @ 10:31)      ANTIBIOTICS:      All available historical records have been reviewed

## 2020-11-16 NOTE — PROGRESS NOTE ADULT - SUBJECTIVE AND OBJECTIVE BOX
DAILY PROGRESS NOTE  ===========================================================    Patient Information:  MIGUEL ALMODOVAR  /  75y  /  Male  /  MRN#: [id number]    Hospital Day: 6d     |:::::::::::::::::::::::::::| SUBJECTIVE |:::::::::::::::::::::::::::|    OVERNIGHT EVENTS: None  TODAY: Patient was seen today at bedside. No complaints this AM. Review of systems is otherwise negative.    |:::::::::::::::::::::::::::| OBJECTIVE |:::::::::::::::::::::::::::|    VITAL SIGNS: Last 24 Hours  T(C): 35.9 (16 Nov 2020 08:00), Max: 37.2 (15 Nov 2020 16:00)  T(F): 96.6 (16 Nov 2020 08:00), Max: 98.9 (15 Nov 2020 16:00)  HR: 66 (16 Nov 2020 12:00) (60 - 81)  BP: 106/67 (16 Nov 2020 12:00) (106/67 - 153/75)  BP(mean): 82 (16 Nov 2020 12:00) (80 - 112)  RR: 22 (16 Nov 2020 12:00) (18 - 47)  SpO2: 90% (16 Nov 2020 12:00) (90% - 99%)    11-15-20 @ 07:01  -  11-16-20 @ 07:00  --------------------------------------------------------  IN: 100 mL / OUT: 1600 mL / NET: -1500 mL    11-16-20 @ 07:01  -  11-16-20 @ 13:33  --------------------------------------------------------  IN: 0 mL / OUT: 400 mL / NET: -400 mL      PHYSICAL EXAM:  GENERAL:   Awake, alert; NAD.  HEENT:  Head NC/AT; Conjunctivae pink, Sclera anicteric; Oral mucosa moist.  CARDIO:   Regular rate; Regular rhythm; S1 & S2.  RESP:   No rales, wheezing, or rhonchi appreciated. HFNC 50/70%  GI:   Soft; NT/ND; BS; No guarding; No rebound tenderness.  EXT:   Strength UE 5/5; Strength LE 5/5; No edema.   SKIN:   Intact.    LAB RESULTS:                        16.1   19.36 )-----------( 287      ( 16 Nov 2020 07:30 )             49.5     11-16    141  |  105  |  45<H>  ----------------------------<  107<H>  4.5   |  25  |  1.2    Ca    8.8      16 Nov 2020 07:30  Mg     2.3     11-16    TPro  5.4<L>  /  Alb  3.0<L>  /  TBili  0.7  /  DBili  x   /  AST  38  /  ALT  58<H>  /  AlkPhos  82  11-16                MICROBIOLOGY:    RADIOLOGY:    ALLERGIES: No Known Allergies      ===========================================================

## 2020-11-16 NOTE — PROGRESS NOTE ADULT - SUBJECTIVE AND OBJECTIVE BOX
Patient is a 75y old  Male who presents with a chief complaint of covid-19 (16 Nov 2020 14:28)        Over Night Events:  On HFNCO2 50 liters 70%         ROS:     All ROS are negative except HPI         PHYSICAL EXAM    ICU Vital Signs Last 24 Hrs  T(C): 36.1 (16 Nov 2020 16:42), Max: 36.1 (15 Nov 2020 20:00)  T(F): 97 (16 Nov 2020 16:42), Max: 97 (16 Nov 2020 16:00)  HR: 68 (16 Nov 2020 18:00) (60 - 78)  BP: 130/75 (16 Nov 2020 18:00) (102/63 - 153/75)  BP(mean): 97 (16 Nov 2020 18:00) (77 - 112)  ABP: --  ABP(mean): --  RR: 20 (16 Nov 2020 18:00) (18 - 23)  SpO2: 92% (16 Nov 2020 18:00) (90% - 99%)      CONSTITUTIONAL:  Well nourished.  NAD    ENT:   Airway patent,   Mouth with normal mucosa.   No thrush    EYES:   Pupils equal,   Round and reactive to light.    CARDIAC:   Normal rate,   Regular rhythm.    No edema      Vascular:  Normal systolic impulse  No Carotid bruits    RESPIRATORY:   No wheezing  Bilateral crackles   Normal chest expansion  Not tachypneic,  No use of accessory muscles    GASTROINTESTINAL:  Abdomen soft,   Non-tender,   No guarding,   + BS    MUSCULOSKELETAL:   Range of motion is not limited,  No clubbing, cyanosis    NEUROLOGICAL:   Alert and oriented   No motor  deficits.    SKIN:   Skin normal color for race,   Warm and dry and intact.   No evidence of rash.    PSYCHIATRIC:   Normal mood and affect.   No apparent risk to self or others.    HEMATOLOGICAL:  No cervical  lymphadenopathy.  no inguinal lymphadenopathy      11-15-20 @ 07:01  -  11-16-20 @ 07:00  --------------------------------------------------------  IN:    Oral Fluid: 100 mL  Total IN: 100 mL    OUT:    Voided (mL): 1600 mL  Total OUT: 1600 mL    Total NET: -1500 mL      11-16-20 @ 07:01  -  11-16-20 @ 19:19  --------------------------------------------------------  IN:  Total IN: 0 mL    OUT:    Voided (mL): 1200 mL  Total OUT: 1200 mL    Total NET: -1200 mL          LABS:                            16.1   19.36 )-----------( 287      ( 16 Nov 2020 07:30 )             49.5                                               11-16    141  |  105  |  45<H>  ----------------------------<  107<H>  4.5   |  25  |  1.2    Ca    8.8      16 Nov 2020 07:30  Mg     2.3     11-16    TPro  5.4<L>  /  Alb  3.0<L>  /  TBili  0.7  /  DBili  x   /  AST  38  /  ALT  58<H>  /  AlkPhos  82  11-16                                                                                           LIVER FUNCTIONS - ( 16 Nov 2020 07:30 )  Alb: 3.0 g/dL / Pro: 5.4 g/dL / ALK PHOS: 82 U/L / ALT: 58 U/L / AST: 38 U/L / GGT: x                                                                                                                                       MEDICATIONS  (STANDING):  aspirin enteric coated 81 milliGRAM(s) Oral daily  atorvastatin 80 milliGRAM(s) Oral at bedtime  dexAMETHasone  Injectable 6 milliGRAM(s) IV Push daily  enoxaparin Study Injectable () 1 Dose(s) SubCutaneous two times a day  furosemide   Injectable 40 milliGRAM(s) IV Push every 12 hours  influenza   Vaccine 0.5 milliLiter(s) IntraMuscular once  lisinopril 20 milliGRAM(s) Oral daily  metoprolol tartrate 12.5 milliGRAM(s) Oral every 12 hours  pantoprazole    Tablet 40 milliGRAM(s) Oral before breakfast  QUEtiapine 25 milliGRAM(s) Oral at bedtime  traMADol 25 milliGRAM(s) Oral once    MEDICATIONS  (PRN):      New X-rays reviewed:                                                                                  ECHO    CXR interpreted by me:

## 2020-11-16 NOTE — PROGRESS NOTE ADULT - ASSESSMENT
74 yo male, pmh of AAA s/p repari, htn, hld, cad, presents to ed for sob and severe weakness, Pt was dx with covid-19  10 days ago, today felt more weak and sob, ems found the pt to be hypoxic.     #Acute hypoxic respiratory failure 2/2 COVID PNA  - COVID+  - s/p toci 11/11  - RDV not indicated at this time  - BLE VA duplex 11/11 showed no acute DVT  - d-dimer 3262 > 979 > 698 11/15  - CRP 16.49 > 3.14 11/13  - Ferritin 3080 > 1516 11/13  - c/w decadron  - Lovenox dose as per research protocol  - Alt HiFLO with BiPAP as tolerated: HFNC at 50/70%  - CXR and inflammatory markers q48h    #Sustained V.Tach  - 1 episode 11/11 ~11:30 pm on tele  - EKG midnight sinus tach with PACs   - Start lopressor 12.5 mg BID  - TTE when respiratory fx stable    #AAA  - c/w ASA, statin    Diet - dash  Activity - as tolerated  DVT ppx - Lovenox dose as per research protocol  GI ppx - protonix  Dispo - CEU   74 yo male, pmh of AAA s/p repari, htn, hld, cad, presents to ed for sob and severe weakness, Pt was dx with covid-19  10 days ago, today felt more weak and sob, ems found the pt to be hypoxic.     #Acute hypoxic respiratory failure 2/2 COVID PNA  - COVID+  - s/p toci 11/11  - RDV not indicated at this time  - BLE VA duplex 11/11 showed no acute DVT  - d-dimer 3262 > 979 > 698 11/15  - CRP 16.49 > 3.14 11/13  - Ferritin 3080 > 1516 11/13  - c/w decadron  - Lovenox dose as per research protocol  - Alt HiFLO with BiPAP as tolerated: HFNC at 50/70%  - CXR and inflammatory markers q48h  - Goals of care: Remains in favor of life prolonging care (11/16)  - Will need a LE duplex at day of discharge (HepCovid study)    #Sustained V.Tach  - 1 episode 11/11 ~11:30 pm on tele  - EKG midnight sinus tach with PACs   - Start lopressor 12.5 mg BID  - TTE when respiratory fx stable    #AAA  - c/w ASA, statin    Diet - dash  Activity - as tolerated  DVT ppx - Lovenox dose as per research protocol  GI ppx - protonix  Dispo - CEU

## 2020-11-16 NOTE — PROGRESS NOTE ADULT - SUBJECTIVE AND OBJECTIVE BOX
MIGUEL ALMODOVAR  75y, Male    All available historical data reviewed    OVERNIGHT EVENTS:  no fevers  95%NRB 15 lit    ROS:  General: Denies rigors, nightsweats  HEENT: Denies headache, rhinorrhea, sore throat, eye pain  CV: Denies CP, palpitations  PULM: Denies wheezing, hemoptysis  GI: Denies hematemesis, hematochezia, melena  : Denies discharge, hematuria  MSK: Denies arthralgias, myalgias  SKIN: Denies rash, lesions  NEURO: Denies paresthesias, weakness  PSYCH: Denies depression, anxiety    VITALS:  T(F): 96.6, Max: 98.9 (11-15-20 @ 16:00)  HR: 72  BP: 106/67  RR: 19Vital Signs Last 24 Hrs  T(C): 35.9 (16 Nov 2020 08:00), Max: 37.2 (15 Nov 2020 16:00)  T(F): 96.6 (16 Nov 2020 08:00), Max: 98.9 (15 Nov 2020 16:00)  HR: 72 (16 Nov 2020 14:00) (60 - 81)  BP: 106/67 (16 Nov 2020 12:00) (106/67 - 153/75)  BP(mean): 82 (16 Nov 2020 12:00) (80 - 112)  RR: 19 (16 Nov 2020 14:00) (18 - 47)  SpO2: 95% (16 Nov 2020 14:00) (90% - 99%)    TESTS & MEASUREMENTS:                        16.1   19.36 )-----------( 287      ( 16 Nov 2020 07:30 )             49.5     11-16    141  |  105  |  45<H>  ----------------------------<  107<H>  4.5   |  25  |  1.2    Ca    8.8      16 Nov 2020 07:30  Mg     2.3     11-16    TPro  5.4<L>  /  Alb  3.0<L>  /  TBili  0.7  /  DBili  x   /  AST  38  /  ALT  58<H>  /  AlkPhos  82  11-16    LIVER FUNCTIONS - ( 16 Nov 2020 07:30 )  Alb: 3.0 g/dL / Pro: 5.4 g/dL / ALK PHOS: 82 U/L / ALT: 58 U/L / AST: 38 U/L / GGT: x                   RADIOLOGY & ADDITIONAL TESTS:  Personal review of radiological diagnostics performed  Echo and EKG results noted when applicable.     MEDICATIONS:  aspirin enteric coated 81 milliGRAM(s) Oral daily  atorvastatin 80 milliGRAM(s) Oral at bedtime  dexAMETHasone  Injectable 6 milliGRAM(s) IV Push daily  enoxaparin Study Injectable () 1 Dose(s) SubCutaneous two times a day  furosemide   Injectable 40 milliGRAM(s) IV Push every 12 hours  influenza   Vaccine 0.5 milliLiter(s) IntraMuscular once  lisinopril 20 milliGRAM(s) Oral daily  metoprolol tartrate 12.5 milliGRAM(s) Oral every 12 hours  pantoprazole    Tablet 40 milliGRAM(s) Oral before breakfast  QUEtiapine 25 milliGRAM(s) Oral at bedtime      ANTIBIOTICS:

## 2020-11-16 NOTE — PROGRESS NOTE ADULT - SUBJECTIVE AND OBJECTIVE BOX
T H I S   I S    N O  T   A    F I N A L I Z E D   N O T MIGUEL BUSBY  75y, Male  Allergy: No Known Allergies    Hospital Day: 6d    Patient seen and examined earlier today.     PMH/PSH:  PAST MEDICAL & SURGICAL HISTORY:  Abdominal aortic aneurysm    Essential hypertension    High cholesterol    Myocardial infarction    S/P aneurysm repair        LAST 24-Hr EVENTS:    VITALS:  T(F): 96.9 (11-16-20 @ 04:00), Max: 98.9 (11-15-20 @ 16:00)  HR: 69 (11-16-20 @ 08:00)  BP: 149/86 (11-16-20 @ 08:00) (105/62 - 153/75)  RR: 20 (11-16-20 @ 08:00)  SpO2: 91% (11-16-20 @ 08:00)        TESTS & MEASUREMENTS:  Weight (Kg):       11-14-20 @ 07:01  -  11-15-20 @ 07:00  --------------------------------------------------------  IN: 600 mL / OUT: 1850 mL / NET: -1250 mL    11-15-20 @ 07:01  -  11-16-20 @ 07:00  --------------------------------------------------------  IN: 100 mL / OUT: 1600 mL / NET: -1500 mL                            16.1   19.36 )-----------( 287      ( 16 Nov 2020 07:30 )             49.5   WBC Trend:  WBC Count: 19.36 (11-16 @ 07:30)  WBC Count: 17.50 (11-15 @ 10:31)  WBC Count: 14.61 (11-14 @ 07:17)        11-16    141  |  105  |  45<H>  ----------------------------<  107<H>  4.5   |  25  |  1.2    Ca    8.8      16 Nov 2020 07:30  Mg     2.3     11-16    TPro  5.4<L>  /  Alb  3.0<L>  /  TBili  0.7  /  DBili  x   /  AST  38  /  ALT  58<H>  /  AlkPhos  82  11-16    LIVER FUNCTIONS - ( 16 Nov 2020 07:30 )  Alb: 3.0 g/dL / Pro: 5.4 g/dL / ALK PHOS: 82 U/L / ALT: 58 U/L / AST: 38 U/L / GGT: x                   Procalcitonin, Serum: 0.15 ng/mL (11-10-20 @ 15:38)  Procalcitonin, Serum: 0.17 ng/mL (11-10-20 @ 11:57)    D-Dimer Assay, Quantitative: 698 ng/mL DDU (11-15-20 @ 10:31)  D-Dimer Assay, Quantitative: 979 ng/mL DDU (11-13-20 @ 06:30)  D-Dimer Assay, Quantitative: 3262 ng/mL DDU (11-10-20 @ 15:38)  D-Dimer Assay, Quantitative: 2886 ng/mL DDU (11-10-20 @ 11:57)    Ferritin, Serum: 1516 ng/mL (11-13-20 @ 06:30)  Ferritin, Serum: 2998 ng/mL (11-10-20 @ 15:38)  Ferritin, Serum: 3080 ng/mL (11-10-20 @ 11:57)    Serum Pro-Brain Natriuretic Peptide: 1085 pg/mL (11-10-20 @ 11:57)    COVID-19 PCR: Detected (11-10-20 @ 11:57)      RADIOLOGY, ECG, & ADDITIONAL TESTS:  < from: Xray Chest 1 View- PORTABLE-Routine (Xray Chest 1 View- PORTABLE-Routine in AM.) (11.16.20 @ 05:29) >  Patchy bilateral opacifications consistent with viral pneumonia without difference.          RECENT DIAGNOSTIC ORDERS:      MEDICATIONS:  MEDICATIONS  (STANDING):  aspirin enteric coated 81 milliGRAM(s) Oral daily  atorvastatin 80 milliGRAM(s) Oral at bedtime  dexAMETHasone  Injectable 6 milliGRAM(s) IV Push daily  enoxaparin Study Injectable () 1 Dose(s) SubCutaneous two times a day  furosemide   Injectable 40 milliGRAM(s) IV Push every 12 hours  influenza   Vaccine 0.5 milliLiter(s) IntraMuscular once  lisinopril 20 milliGRAM(s) Oral daily  metoprolol tartrate 12.5 milliGRAM(s) Oral every 12 hours  pantoprazole    Tablet 40 milliGRAM(s) Oral before breakfast  QUEtiapine 25 milliGRAM(s) Oral at bedtime    MEDICATIONS  (PRN):      HOME MEDICATIONS:  Aspir 81 oral delayed release tablet (11-10)  Lipitor 80 mg oral tablet (11-10)  lisinopril 10 mg oral tablet (11-10)  metoprolol succinate 50 mg oral tablet, extended release (11-10)      PHYSICAL EXAM:  GENERAL:   NECK:   CHEST/LUNG:   HEART:   ABDOMEN:   EXTREMITIES:             MIGUEL ALMODOVAR  75y, Male  Allergy: No Known Allergies    Hospital Day: 6d    Patient seen and examined earlier today.       LAST 24-Hr EVENTS:    Patient feeling better today  Remains on High Flow Nasal Cannula (HFNC)     VITALS:  T(F): 96.9 (11-16-20 @ 04:00), Max: 98.9 (11-15-20 @ 16:00)  HR: 69 (11-16-20 @ 08:00)  BP: 149/86 (11-16-20 @ 08:00) (105/62 - 153/75)  RR: 20 (11-16-20 @ 08:00)  SpO2: 91% (11-16-20 @ 08:00) on HFNC        TESTS & MEASUREMENTS:      11-14-20 @ 07:01  -  11-15-20 @ 07:00  --------------------------------------------------------  IN: 600 mL / OUT: 1850 mL / NET: -1250 mL    11-15-20 @ 07:01  -  11-16-20 @ 07:00  --------------------------------------------------------  IN: 100 mL / OUT: 1600 mL / NET: -1500 mL                            16.1   19.36 )-----------( 287      ( 16 Nov 2020 07:30 )             49.5   WBC Trend:  WBC Count: 19.36 (11-16 @ 07:30)  WBC Count: 17.50 (11-15 @ 10:31)  WBC Count: 14.61 (11-14 @ 07:17)        11-16    141  |  105  |  45<H>  ----------------------------<  107<H>  4.5   |  25  |  1.2    Ca    8.8      16 Nov 2020 07:30  Mg     2.3     11-16    TPro  5.4<L>  /  Alb  3.0<L>  /  TBili  0.7  /  DBili  x   /  AST  38  /  ALT  58<H>  /  AlkPhos  82  11-16    LIVER FUNCTIONS - ( 16 Nov 2020 07:30 )  Alb: 3.0 g/dL / Pro: 5.4 g/dL / ALK PHOS: 82 U/L / ALT: 58 U/L / AST: 38 U/L / GGT: x                   Procalcitonin, Serum: 0.15 ng/mL (11-10-20 @ 15:38)  Procalcitonin, Serum: 0.17 ng/mL (11-10-20 @ 11:57)    D-Dimer Assay, Quantitative: 698 ng/mL DDU (11-15-20 @ 10:31)  D-Dimer Assay, Quantitative: 979 ng/mL DDU (11-13-20 @ 06:30)  D-Dimer Assay, Quantitative: 3262 ng/mL DDU (11-10-20 @ 15:38)  D-Dimer Assay, Quantitative: 2886 ng/mL DDU (11-10-20 @ 11:57)    Ferritin, Serum: 1516 ng/mL (11-13-20 @ 06:30)  Ferritin, Serum: 2998 ng/mL (11-10-20 @ 15:38)  Ferritin, Serum: 3080 ng/mL (11-10-20 @ 11:57)    Serum Pro-Brain Natriuretic Peptide: 1085 pg/mL (11-10-20 @ 11:57)    COVID-19 PCR: Detected (11-10-20 @ 11:57)      RADIOLOGY, ECG, & ADDITIONAL TESTS:  < from: Xray Chest 1 View- PORTABLE-Routine (Xray Chest 1 View- PORTABLE-Routine in AM.) (11.16.20 @ 05:29) >  Patchy bilateral opacifications consistent with viral pneumonia without difference.        MEDICATIONS:  MEDICATIONS  (STANDING):  aspirin enteric coated 81 milliGRAM(s) Oral daily  atorvastatin 80 milliGRAM(s) Oral at bedtime  dexAMETHasone  Injectable 6 milliGRAM(s) IV Push daily  enoxaparin Study Injectable () 1 Dose(s) SubCutaneous two times a day  furosemide   Injectable 40 milliGRAM(s) IV Push every 12 hours  influenza   Vaccine 0.5 milliLiter(s) IntraMuscular once  lisinopril 20 milliGRAM(s) Oral daily  metoprolol tartrate 12.5 milliGRAM(s) Oral every 12 hours  pantoprazole    Tablet 40 milliGRAM(s) Oral before breakfast  QUEtiapine 25 milliGRAM(s) Oral at bedtime      HOME MEDICATIONS:  Aspir 81 oral delayed release tablet (11-10)  Lipitor 80 mg oral tablet (11-10)  lisinopril 10 mg oral tablet (11-10)  metoprolol succinate 50 mg oral tablet, extended release (11-10)      PHYSICAL EXAM:  GENERAL: In NAD/P sitting comfortably in bed  CHEST/LUNG: No wheezing/ Good air entry (anterior auscultation)  HEART: S1S2  ABDOMEN: Soft  EXTREMITIES:  No edema

## 2020-11-16 NOTE — PROGRESS NOTE ADULT - ASSESSMENT
·	Acute Hypoxic Respiratory Failure secondary to COVID19 pneumonia, remains in need for High Flow Nasal Cannula (HFNC)   ·	Self resolved ventricular arrythmia; possibly artefactual or related underlying illness  ·	H/O arterial disease and AAA repair in the past  ·	Leukemoid reaction due to steroids     PLAN  ·	O2 support and ventilation management as per Pulmonary & Critical Care Medicine team   ·	Close Follow up re: cardiovascular status  ·	On statins   ·	Enrolled in HepCovid study (high risk for thrombotic events)    Remains in favor of life prolonging care (refer to Goals of Care Conversation documentation)  Patient's prognosis remains unclear at this time since he's at high risk for decompensation     Case discussed with resident assigned. ·	Acute Hypoxic Respiratory Failure secondary to COVID19 pneumonia, remains in need for High Flow Nasal Cannula (HFNC)   ·	Self resolved ventricular arrythmia; possibly artefactual or related underlying illness  ·	H/O arterial disease and AAA repair in the past  ·	Leukemoid reaction due to steroids     PLAN  ·	O2 support and ventilation management as per Pulmonary & Critical Care Medicine team   ·	Close Follow up re: cardiovascular status  ·	On statins   ·	Enrolled in HepCovid study (high risk for thrombotic events). He will need a LE duplex at day of discharge (HepCovid study)    Remains in favor of life prolonging care (refer to Goals of Care Conversation documentation)  Patient's prognosis remains unclear at this time since he's at high risk for decompensation     Case discussed with resident assigned.

## 2020-11-16 NOTE — PROGRESS NOTE ADULT - ASSESSMENT
IMPRESSION:    Acute hypoxic respiratory failure slowly improving  Sepsis present on admission  COVID 19 pneumonia   NSVT ( Echo 40%)      SUGGEST:      CNS: Avoid CNS depressant    HEENT: Oral care    PULMONARY:  HOB @ 45 degrees.  HHFNC alternate with BIPAP, keep Sao2 92 to 96%, Decadron    CARDIOVASCULAR: Negative balance as tolerated.  BP COntrol     GI: GI prophylaxis.  Feeding as tolerated     RENAL:  Follow up lytes.  Correct as needed    INFECTIOUS DISEASE: infl markers,  Decadron/per ID    HEMATOLOGICAL:  AC    ENDOCRINE:  Follow up FS.  Insulin protocol if needed.    MUSCULOSKELETAL: bedrest    CEU  poor prognosis

## 2020-11-16 NOTE — PROGRESS NOTE ADULT - ASSESSMENT
· Assessment	  74 yo male, pmh of AAA s/p repari, htn, hld, cad, presents to ed for sob and severe weakness, Pt was dx with covid-19  10 days ago, today felt more weak and sob, ems found the pt to be hypoxic     IMPRESSION;  COVID19 with severe/critical  illness. Hypoxemia 95% NRB 15 lit  . CXR >50% opacities.  Pt is in the late immune/inflammatory  mediated phase based on the onset of symptoms >10 d ( anti virals / plasma of no benefit )  Serology positive   Cytokine response  Elevation of the inflammatory markers which have been decreasing  -procalcitonin of 0.17 not suggestive of a bacterial PNA/infection  -Ddimers 3262>698  -Ferritin 2998>1516  -CRP 16.49>3.14  -    RECOMMENDATIONS;  RDV/ Plasma of no benefit  Dexamethasone 6 mg iv q24h for 10 days 11/10-20 ( or until discharge ) or equivalent glucocorticoid dose may be substituted. Equivalent total dosis of alternative steroids are Methylprednisolone 32 mg and prednisone 40 mg q24h.  Monitor for side effects: hyperglycemia, neurological ( agitation/confusion), adrenal suppression, bacterial and fungal infections  S/p Tocilizumab 400 mg iv x once.11/11.   Anticoagulation as per team

## 2020-11-17 NOTE — PROGRESS NOTE ADULT - SUBJECTIVE AND OBJECTIVE BOX
DAILY PROGRESS NOTE  ===========================================================    Patient Information:  MIGUEL ALMODOVAR  /  75y  /  Male  /  MRN#: [id number]    Hospital Day: 7d     |:::::::::::::::::::::::::::| SUBJECTIVE |:::::::::::::::::::::::::::|    OVERNIGHT EVENTS: None  TODAY: Patient was seen today at bedside. Patient is hungry this AM, otherwise no complaints. Review of systems is otherwise negative.    |:::::::::::::::::::::::::::| OBJECTIVE |:::::::::::::::::::::::::::|    VITAL SIGNS: Last 24 Hours  T(C): 36.1 (17 Nov 2020 08:11), Max: 36.3 (16 Nov 2020 20:00)  T(F): 97 (17 Nov 2020 08:11), Max: 97.4 (16 Nov 2020 20:00)  HR: 65 (17 Nov 2020 11:00) (61 - 72)  BP: 124/70 (17 Nov 2020 11:00) (102/63 - 132/85)  BP(mean): 94 (17 Nov 2020 04:00) (77 - 97)  RR: 20 (17 Nov 2020 11:00) (15 - 23)  SpO2: 98% (17 Nov 2020 11:00) (92% - 98%)    11-16-20 @ 07:01  -  11-17-20 @ 07:00  --------------------------------------------------------  IN: 0 mL / OUT: 1600 mL / NET: -1600 mL      PHYSICAL EXAM:  GENERAL:   Awake, alert; NAD.  HEENT:  Head NC/AT; Conjunctivae pink, Sclera anicteric; Oral mucosa moist.  CARDIO:   Regular rate; Regular rhythm; S1 & S2.  RESP:   No rales, wheezing, or rhonchi appreciated. HFNC  GI:   Soft; NT/ND; BS; No guarding; No rebound tenderness.  EXT:   Strength UE 5/5; Strength LE 5/5; No edema.   SKIN:   Intact.    LAB RESULTS:                        16.8   20.82 )-----------( 317      ( 17 Nov 2020 09:00 )             52.1     11-17    136  |  101  |  51<H>  ----------------------------<  162<H>  4.3   |  23  |  1.3    Ca    8.7      17 Nov 2020 09:00  Mg     2.3     11-16    TPro  5.7<L>  /  Alb  3.1<L>  /  TBili  0.8  /  DBili  x   /  AST  36  /  ALT  64<H>  /  AlkPhos  80  11-17      MICROBIOLOGY:    RADIOLOGY:    ALLERGIES: No Known Allergies      ===========================================================

## 2020-11-17 NOTE — PROGRESS NOTE ADULT - SUBJECTIVE AND OBJECTIVE BOX
Patient is a 75y old  Male who presents with a chief complaint of covid-19 (17 Nov 2020 13:09)    HPI:   74 yo male, pmh of AAA s/p repari, htn, hld, cad, presents to ed for sob and severe weakness, Pt was dx with covid-19  10 days ago, today felt more weak and sob, ems found the pt to be hypoxic (10 Nov 2020 13:21)    PAST MEDICAL & SURGICAL HISTORY:  Abdominal aortic aneurysm  Essential hypertension  High cholesterol  Myocardial infarction  S/P aneurysm repair    patient seen and examined independently on morning rounds for the first time today, chart reviewed and discussed with the medicine resident and on interdisciplinary rounds.    no overnight events---remains on HF--tolerating breakfast and says he feels better today--     Vital Signs Last 24 Hrs  T(C): 36.1 (17 Nov 2020 13:57), Max: 36.3 (16 Nov 2020 20:00)  T(F): 97 (17 Nov 2020 13:57), Max: 97.4 (16 Nov 2020 20:00)  HR: 86 (17 Nov 2020 13:57) (61 - 86)  BP: 118/67 (17 Nov 2020 13:57) (113/70 - 132/85)  BP(mean): 94 (17 Nov 2020 04:00) (84 - 97)  RR: 20 (17 Nov 2020 13:57) (15 - 23)  SpO2: 90% (17 Nov 2020 13:57) (90% - 98%)             PE:  GEN-NAD, AAOx3  PULM- Clear to auscultation bilaterally, fair air entry  CVS- +s1/s2 RRR  GI- soft NT obese ND +bs, no rebound, no guarding  EXT- 1+ edema               16.8   20.82 )-----------( 317      ( 17 Nov 2020 09:00 )             52.1     11-17    136  |  101  |  51<H>  ----------------------------<  162<H>  4.3   |  23  |  1.3    Ca    8.7      17 Nov 2020 09:00  Mg     2.3     11-16    TPro  5.7<L>  /  Alb  3.1<L>  /  TBili  0.8  /  DBili  x   /  AST  36  /  ALT  64<H>  /  AlkPhos  80  11-17              MEDICATIONS  (STANDING):  aspirin enteric coated 81 milliGRAM(s) Oral daily  atorvastatin 80 milliGRAM(s) Oral at bedtime  dexAMETHasone  Injectable 6 milliGRAM(s) IV Push daily  enoxaparin Study Injectable () 1 Dose(s) SubCutaneous two times a day  furosemide   Injectable 40 milliGRAM(s) IV Push every 12 hours  influenza   Vaccine 0.5 milliLiter(s) IntraMuscular once  lisinopril 20 milliGRAM(s) Oral daily  metoprolol tartrate 12.5 milliGRAM(s) Oral every 12 hours  pantoprazole    Tablet 40 milliGRAM(s) Oral before breakfast  QUEtiapine 25 milliGRAM(s) Oral at bedtime

## 2020-11-17 NOTE — PROGRESS NOTE ADULT - ASSESSMENT
a/p:  #acute hypoxic respiratory failure 2/2 covid-19 pneumonia  -continue airbourne contact isolation precautions  -continue managment as per SDU/CEU   -remains on HFNC--taper as tolerated--trend o2 sat  -daily cxr and abg prn  -is on lovenox investigational study---will need duplex prior to dc   -Incentive spirometry  -monitor esr/crp and inflamm markers (check procal, lymphocytes)    DVT/GI ppx  guarded prognosis--patient is high risk for cardiopulmonary complicated due to COVID 19 infection    FULL CODE

## 2020-11-17 NOTE — PROGRESS NOTE ADULT - ASSESSMENT
IMPRESSION:    Acute hypoxic respiratory failure slowly improving  Sepsis present on admission  COVID 19 pneumonia   NSVT ( Echo 40%)      SUGGEST:      CNS: Avoid CNS depressant    HEENT: Oral care    PULMONARY:  HOB @ 45 degrees.  HHFNC alternate with BIPAP, keep Sao2 92 to 96%, Decadron.  Incentive spirometry     CARDIOVASCULAR: Negative balance as tolerated.      GI: GI prophylaxis.  Feeding as tolerated     RENAL:  Follow up lytes.  Correct as needed    INFECTIOUS DISEASE:  per ID    HEMATOLOGICAL:  AC    ENDOCRINE:  Follow up FS.  Insulin protocol if needed.    MUSCULOSKELETAL: bedrest    CEU  poor prognosis

## 2020-11-17 NOTE — PROGRESS NOTE ADULT - ASSESSMENT
· Assessment	  76 yo male, pmh of AAA s/p repari, htn, hld, cad, presents to ed for sob and severe weakness, Pt was dx with covid-19  10 days ago, today felt more weak and sob, ems found the pt to be hypoxic     IMPRESSION;  COVID19 with severe/critical  illness. Hypoxemia HFNC  . CXR >50% opacities.  Pt is in the late immune/inflammatory  mediated phase based on the onset of symptoms >10 d ( anti virals / plasma of no benefit )  Serology positive   Cytokine response  Elevation of the inflammatory markers which have been decreasing  -procalcitonin of 0.17 not suggestive of a bacterial PNA/infection  -Ddimers 3262>698>473  -Ferritin 2998>1516>1327  -CRP 16.49>3.14>0.94    RECOMMENDATIONS;  RDV/ Plasma of no benefit  Dexamethasone 6 mg iv q24h for 10 days 11/10-20 ( or until discharge ) or equivalent glucocorticoid dose may be substituted. Equivalent total dosis of alternative steroids are Methylprednisolone 32 mg and prednisone 40 mg q24h.  Monitor for side effects: hyperglycemia, neurological ( agitation/confusion), adrenal suppression, bacterial and fungal infections  S/p Tocilizumab 400 mg iv x once.11/11.   Anticoagulation as per team

## 2020-11-17 NOTE — PROGRESS NOTE ADULT - ASSESSMENT
76 yo male, pmh of AAA s/p repari, htn, hld, cad, presents to ed for sob and severe weakness, Pt was dx with covid-19  10 days ago, today felt more weak and sob, ems found the pt to be hypoxic.     #Acute hypoxic respiratory failure 2/2 COVID PNA  - COVID+  - s/p toci 11/11, RDV not indicated at this time  - BLE VA duplex 11/11 showed no acute DVT  - d-dimer 3262 > 979 > 698 11/15  - CRP 16.49 > 3.14 11/13  - Ferritin 3080 > 1516 11/13  - c/w decadron (x 10 days to 11/20)  - Lovenox dose per HepCOVID research protocol  -- Will require LE duplex on day of discharge  - Alt HiFLO with BiPAP as tolerated: HFNC at 50/70%  - CXR and inflammatory markers q48h  - Goals of care: Remains in favor of life prolonging care (11/16)    #Sustained V.Tach  - 1 episode 11/11 ~11:30 pm on tele  - EKG midnight sinus tach with PACs   - Start lopressor 12.5 mg BID  - TTE 11/13: Ef 40%, grade I diastolic dysfunction     #AAA  - c/w ASA, statin    Diet - dash  Activity - as tolerated  DVT ppx - Lovenox dose as per research protocol  GI ppx - protonix  Dispo - CEU

## 2020-11-17 NOTE — PROGRESS NOTE ADULT - SUBJECTIVE AND OBJECTIVE BOX
MIGUEL ALMODOVAR  75y, Male    All available historical data reviewed    OVERNIGHT EVENTS:  no fevers  feels well and has no complaints  HFNC    ROS:  General: Denies rigors, nightsweats  HEENT: Denies headache, rhinorrhea, sore throat, eye pain  CV: Denies CP, palpitations  PULM: Denies wheezing, hemoptysis  GI: Denies hematemesis, hematochezia, melena  : Denies discharge, hematuria  MSK: Denies arthralgias, myalgias  SKIN: Denies rash, lesions  NEURO: Denies paresthesias, weakness  PSYCH: Denies depression, anxiety    VITALS:  T(F): 97, Max: 97.4 (11-16-20 @ 20:00)  HR: 65  BP: 124/70  RR: 20Vital Signs Last 24 Hrs  T(C): 36.1 (17 Nov 2020 08:11), Max: 36.3 (16 Nov 2020 20:00)  T(F): 97 (17 Nov 2020 08:11), Max: 97.4 (16 Nov 2020 20:00)  HR: 65 (17 Nov 2020 11:00) (61 - 72)  BP: 124/70 (17 Nov 2020 11:00) (102/63 - 132/85)  BP(mean): 94 (17 Nov 2020 04:00) (77 - 97)  RR: 20 (17 Nov 2020 11:00) (15 - 23)  SpO2: 98% (17 Nov 2020 11:00) (92% - 98%)    TESTS & MEASUREMENTS:                        16.8   20.82 )-----------( 317      ( 17 Nov 2020 09:00 )             52.1     11-17    136  |  101  |  51<H>  ----------------------------<  162<H>  4.3   |  23  |  1.3    Ca    8.7      17 Nov 2020 09:00  Mg     2.3     11-16    TPro  5.7<L>  /  Alb  3.1<L>  /  TBili  0.8  /  DBili  x   /  AST  36  /  ALT  64<H>  /  AlkPhos  80  11-17    LIVER FUNCTIONS - ( 17 Nov 2020 09:00 )  Alb: 3.1 g/dL / Pro: 5.7 g/dL / ALK PHOS: 80 U/L / ALT: 64 U/L / AST: 36 U/L / GGT: x                   RADIOLOGY & ADDITIONAL TESTS:  Personal review of radiological diagnostics performed  Echo and EKG results noted when applicable.     MEDICATIONS:  aspirin enteric coated 81 milliGRAM(s) Oral daily  atorvastatin 80 milliGRAM(s) Oral at bedtime  dexAMETHasone  Injectable 6 milliGRAM(s) IV Push daily  enoxaparin Study Injectable () 1 Dose(s) SubCutaneous two times a day  furosemide   Injectable 40 milliGRAM(s) IV Push every 12 hours  influenza   Vaccine 0.5 milliLiter(s) IntraMuscular once  lisinopril 20 milliGRAM(s) Oral daily  metoprolol tartrate 12.5 milliGRAM(s) Oral every 12 hours  pantoprazole    Tablet 40 milliGRAM(s) Oral before breakfast  QUEtiapine 25 milliGRAM(s) Oral at bedtime      ANTIBIOTICS:

## 2020-11-17 NOTE — PROGRESS NOTE ADULT - SUBJECTIVE AND OBJECTIVE BOX
Patient is a 75y old  Male who presents with a chief complaint of covid-19 (17 Nov 2020 15:34)        Over Night Events:  On HFNCO2.          ROS:     All ROS are negative except HPI         PHYSICAL EXAM    ICU Vital Signs Last 24 Hrs  T(C): 35.9 (17 Nov 2020 16:00), Max: 36.3 (16 Nov 2020 20:00)  T(F): 96.7 (17 Nov 2020 16:00), Max: 97.4 (16 Nov 2020 20:00)  HR: 66 (17 Nov 2020 16:00) (61 - 86)  BP: 131/74 (17 Nov 2020 16:00) (118/67 - 132/85)  BP(mean): 94 (17 Nov 2020 04:00) (94 - 96)  ABP: --  ABP(mean): --  RR: 18 (17 Nov 2020 16:00) (15 - 23)  SpO2: 94% (17 Nov 2020 16:00) (90% - 98%)      CONSTITUTIONAL:  Well nourished.  NAD    ENT:   Airway patent,   Mouth with normal mucosa.   No thrush    EYES:   Pupils equal,   Round and reactive to light.    CARDIAC:   Normal rate,   Regular rhythm.    No edema      Vascular:  Normal systolic impulse  No Carotid bruits    RESPIRATORY:   No wheezing  Bilateral BS  Normal chest expansion  Not tachypneic,  No use of accessory muscles    GASTROINTESTINAL:  Abdomen soft,   Non-tender,   No guarding,   + BS    MUSCULOSKELETAL:   Range of motion is not limited,  No clubbing, cyanosis    NEUROLOGICAL:   Alert and oriented   No motor  deficits.    SKIN:   Skin normal color for race,   Warm and dry and intact.   No evidence of rash.    PSYCHIATRIC:   Normal mood and affect.   No apparent risk to self or others.    HEMATOLOGICAL:  No cervical  lymphadenopathy.  no inguinal lymphadenopathy      11-16-20 @ 07:01  -  11-17-20 @ 07:00  --------------------------------------------------------  IN:  Total IN: 0 mL    OUT:    Voided (mL): 1600 mL  Total OUT: 1600 mL    Total NET: -1600 mL      11-17-20 @ 07:01  -  11-17-20 @ 18:04  --------------------------------------------------------  IN:  Total IN: 0 mL    OUT:    Voided (mL): 500 mL  Total OUT: 500 mL    Total NET: -500 mL          LABS:                            16.8   20.82 )-----------( 317      ( 17 Nov 2020 09:00 )             52.1                                               11-17    136  |  101  |  51<H>  ----------------------------<  162<H>  4.3   |  23  |  1.3    Ca    8.7      17 Nov 2020 09:00  Mg     2.3     11-16    TPro  5.7<L>  /  Alb  3.1<L>  /  TBili  0.8  /  DBili  x   /  AST  36  /  ALT  64<H>  /  AlkPhos  80  11-17                                                                                           LIVER FUNCTIONS - ( 17 Nov 2020 09:00 )  Alb: 3.1 g/dL / Pro: 5.7 g/dL / ALK PHOS: 80 U/L / ALT: 64 U/L / AST: 36 U/L / GGT: x                                                                                                                                       MEDICATIONS  (STANDING):  aspirin enteric coated 81 milliGRAM(s) Oral daily  atorvastatin 80 milliGRAM(s) Oral at bedtime  dexAMETHasone  Injectable 6 milliGRAM(s) IV Push daily  enoxaparin Study Injectable () 1 Dose(s) SubCutaneous two times a day  furosemide   Injectable 40 milliGRAM(s) IV Push every 12 hours  influenza   Vaccine 0.5 milliLiter(s) IntraMuscular once  lisinopril 20 milliGRAM(s) Oral daily  metoprolol tartrate 12.5 milliGRAM(s) Oral every 12 hours  pantoprazole    Tablet 40 milliGRAM(s) Oral before breakfast  QUEtiapine 25 milliGRAM(s) Oral at bedtime    MEDICATIONS  (PRN):      New X-rays reviewed:                                                                                  ECHO    CXR interpreted by me:

## 2020-11-18 NOTE — PROGRESS NOTE ADULT - ASSESSMENT
74 yo male, pmh of AAA s/p repari, htn, hld, cad, presents to ed for sob and severe weakness, Pt was dx with covid-19  10 days ago, today felt more weak and sob, ems found the pt to be hypoxic.     #Acute hypoxic respiratory failure 2/2 COVID PNA  - COVID+  - s/p toci 11/11, RDV not indicated at this time  - BLE VA duplex 11/11 showed no acute DVT  - d-dimer 3262 > 979 > 698 11/15  - CRP 16.49 > 3.14 11/13  - Ferritin 3080 > 1516 11/13  - c/w decadron (x 10 days to 11/20)  - Lovenox dose per HepCOVID research protocol  -- Will require LE duplex on day of discharge  - Alt HiFLO with BiPAP as tolerated: HFNC at 50/70% - stable O2 requirement  - CXR and inflammatory markers q48h  - Goals of care: Remains in favor of life prolonging care (11/16)    #Sustained V.Tach  - 1 episode 11/11 ~11:30 pm on tele  - EKG midnight sinus tach with PACs   - Start lopressor 12.5 mg BID  - TTE 11/13: Ef 40%, grade I diastolic dysfunction     #AAA  - c/w ASA, statin    Diet - dash  Activity - as tolerated  DVT ppx - Lovenox dose as per research protocol  GI ppx - protonix  Dispo - CEU   76 yo male, pmh of AAA s/p repari, htn, hld, cad, presents to ed for sob and severe weakness, Pt was dx with covid-19  10 days ago, today felt more weak and sob, ems found the pt to be hypoxic.     #Acute hypoxic respiratory failure 2/2 COVID PNA  - COVID+  - s/p toci 11/11, RDV not indicated at this time  - BLE VA duplex 11/11 showed no acute DVT  - d-dimer 3262 > 979 > 698 > 473 11/157  - CRP 16.49 > 3.14 > 0.39 11/17  - Ferritin 3080 > 1516 > 1183 11/17  - c/w decadron (x 10 days to 11/20)  - Lovenox dose per HepCOVID research protocol  -- Will require LE duplex on day of discharge  - Alt HiFLO with BiPAP as tolerated: HFNC at 50/70% - stable O2 requirement  - CXR and inflammatory markers q48h  - Goals of care: Remains in favor of life prolonging care (11/16)    #Sustained V.Tach  - 1 episode 11/11 ~11:30 pm on tele  - EKG midnight sinus tach with PACs   - Start lopressor 12.5 mg BID  - TTE 11/13: Ef 40%, grade I diastolic dysfunction     #AAA  - c/w ASA, statin    Diet - dash  Activity - as tolerated  DVT ppx - Lovenox dose as per research protocol  GI ppx - protonix  Dispo - CEU

## 2020-11-18 NOTE — DIETITIAN INITIAL EVALUATION ADULT. - OTHER CALCULATIONS
est kcal needs = 4884-8655 kcal/day (MSJ x1.2-1.3, aim toward lower end); est protein needs =  g/day (1.0-1.2 g/kg CBW, aim toward lower end); est fluid needs = per CEU

## 2020-11-18 NOTE — DIETITIAN INITIAL EVALUATION ADULT. - ENERGY INTAKE
Unable to reach pt via multiple attempts. As per RN pt with consistent 75% intake since admit. No difficulty chewing/swallowing foods. No further nutrition interventions at this time. Good (>75%)

## 2020-11-18 NOTE — DIETITIAN INITIAL EVALUATION ADULT. - ADD RECOMMEND
Pt to continue to consume at least 75% or more of meals and supplements throughout LOS. Reassess in 7 days. RD will monitor diet order, energy intake, nutrition related labs, body composition, NFPF

## 2020-11-18 NOTE — PROGRESS NOTE ADULT - SUBJECTIVE AND OBJECTIVE BOX
MIGUEL ALMODOVAR  75y, Male    All available historical data reviewed    OVERNIGHT EVENTS:  no fevers  HFNC 60%  feels well and has no complaints      ROS:  General: Denies rigors, nightsweats  HEENT: Denies headache, rhinorrhea, sore throat, eye pain  CV: Denies CP, palpitations  PULM: Denies wheezing, hemoptysis  GI: Denies hematemesis, hematochezia, melena  : Denies discharge, hematuria  MSK: Denies arthralgias, myalgias  SKIN: Denies rash, lesions  NEURO: Denies paresthesias, weakness  PSYCH: Denies depression, anxiety    VITALS:  T(F): 97, Max: 97.1 (11-18-20 @ 04:20)  HR: 81  BP: 124/67  RR: 20Vital Signs Last 24 Hrs  T(C): 36.1 (18 Nov 2020 12:59), Max: 36.2 (18 Nov 2020 04:20)  T(F): 97 (18 Nov 2020 12:59), Max: 97.1 (18 Nov 2020 04:20)  HR: 81 (18 Nov 2020 12:59) (58 - 84)  BP: 124/67 (18 Nov 2020 12:59) (106/67 - 155/81)  BP(mean): --  RR: 20 (18 Nov 2020 12:59) (18 - 20)  SpO2: 94% (18 Nov 2020 13:00) (94% - 100%)    TESTS & MEASUREMENTS:                        16.5   22.59 )-----------( 362      ( 18 Nov 2020 05:54 )             50.3     11-18    140  |  102  |  58<H>  ----------------------------<  116<H>  4.2   |  24  |  1.2    Ca    8.8      18 Nov 2020 05:54    TPro  5.7<L>  /  Alb  3.3<L>  /  TBili  0.7  /  DBili  x   /  AST  37  /  ALT  78<H>  /  AlkPhos  86  11-18    LIVER FUNCTIONS - ( 18 Nov 2020 05:54 )  Alb: 3.3 g/dL / Pro: 5.7 g/dL / ALK PHOS: 86 U/L / ALT: 78 U/L / AST: 37 U/L / GGT: x                   RADIOLOGY & ADDITIONAL TESTS:  Personal review of radiological diagnostics performed  Echo and EKG results noted when applicable.     MEDICATIONS:  aspirin enteric coated 81 milliGRAM(s) Oral daily  atorvastatin 80 milliGRAM(s) Oral at bedtime  dexAMETHasone  Injectable 6 milliGRAM(s) IV Push daily  enoxaparin Study Injectable () 1 Dose(s) SubCutaneous two times a day  furosemide   Injectable 40 milliGRAM(s) IV Push every 12 hours  influenza   Vaccine 0.5 milliLiter(s) IntraMuscular once  lisinopril 20 milliGRAM(s) Oral daily  metoprolol tartrate 12.5 milliGRAM(s) Oral every 12 hours  pantoprazole    Tablet 40 milliGRAM(s) Oral before breakfast  QUEtiapine 25 milliGRAM(s) Oral at bedtime      ANTIBIOTICS:

## 2020-11-18 NOTE — PROGRESS NOTE ADULT - ASSESSMENT
a/p:  #acute hypoxic respiratory failure 2/2 covid-19 pneumonia  -continue airbourne contact isolation precautions  -continue managment as per SDU/CEU   -remains on HFNC--taper as tolerated--trend o2 sat  -daily cxr and abg prn  -is on lovenox investigational study---will need duplex prior to dc   -Incentive spirometry  -monitor esr/crp and inflamm markers (check procal, lymphocytes)  -monitor wbc---suspect leukocytosis 2/2 steroid (wbc today 22k)-  -cont dexamethasone 6 mg daily    DVT/GI ppx  FULL CODE    guarded prognosis--patient is high risk for cardiopulmonary complicated due to COVID 19 infection

## 2020-11-18 NOTE — DIETITIAN INITIAL EVALUATION ADULT. - PERSON TAUGHT/METHOD
Unable to reach pt. Unable to provide detailed diet ed to wife as she declined and ended conversation early stating "she could not speak for long". will reattempt at f/u PRN.

## 2020-11-18 NOTE — DIETITIAN INITIAL EVALUATION ADULT. - OTHER INFO
Pt diagnosed with COVID-19 [id number] PTA and p/w worsening acute SOB, weakness & hypoxia. Acute hypoxic respiratory failure 2/2 COVID PNA--s/p toci 11/11, RDV not indicated at this time; currently HFNC at 50/70% - stable O2 requirement, alternate with BiPAP. Sustained V.Tach--s/p TTE 11/13: Ef 40%, grade I diastolic dysfunction.

## 2020-11-18 NOTE — PROGRESS NOTE ADULT - SUBJECTIVE AND OBJECTIVE BOX
Patient is a 75y old  Male who presents with a chief complaint of covid-19 (17 Nov 2020 13:09)    HPI:   76 yo male, pmh of AAA s/p repari, htn, hld, cad, presents to ed for sob and severe weakness, Pt was dx with covid-19  10 days ago, today felt more weak and sob, ems found the pt to be hypoxic (10 Nov 2020 13:21)    PAST MEDICAL & SURGICAL HISTORY:  Abdominal aortic aneurysm  Essential hypertension  High cholesterol  Myocardial infarction  S/P aneurysm repair    patient seen and examined independently on morning rounds, chart reviewed and discussed with the medicine resident and on interdisciplinary rounds.    no overnight events-remains on HFNC (40/60%--tolerating breakfast                PE:  GEN-NAD, AAOx3  PULM- Clear to auscultation bilaterally, fair air entry  CVS- +s1/s2 RRR  GI- soft NT obese ND +bs, no rebound, no guarding  EXT- 1+ edema        Labs:                        16.5   22.59 )-----------( 362      ( 18 Nov 2020 05:54 )             50.3     CBC Full  -  ( 18 Nov 2020 05:54 )  WBC Count : 22.59 K/uL  RBC Count : 5.56 M/uL  Hemoglobin : 16.5 g/dL  Hematocrit : 50.3 %  Platelet Count - Automated : 362 K/uL  Mean Cell Volume : 90.5 fL  Mean Cell Hemoglobin : 29.7 pg  Mean Cell Hemoglobin Concentration : 32.8 g/dL  Auto Neutrophil # : 18.44 K/uL  Auto Lymphocyte # : 1.18 K/uL  Auto Monocyte # : 2.24 K/uL  Auto Eosinophil # : 0.04 K/uL  Auto Basophil # : 0.09 K/uL  Auto Neutrophil % : 81.6 %  Auto Lymphocyte % : 5.2 %  Auto Monocyte % : 9.9 %  Auto Eosinophil % : 0.2 %  Auto Basophil % : 0.4 %      11-18    140  |  102  |  58<H>  ----------------------------<  116<H>  4.2   |  24  |  1.2    Ca    8.8      18 Nov 2020 05:54    TPro  5.7<L>  /  Alb  3.3<L>  /  TBili  0.7  /  DBili  x   /  AST  37  /  ALT  78<H>  /  AlkPhos  86  11-18      Microbiology:      Vital Signs Last 24 Hrs  T(C): 36.1 (18 Nov 2020 12:59), Max: 36.2 (18 Nov 2020 04:20)  T(F): 97 (18 Nov 2020 12:59), Max: 97.1 (18 Nov 2020 04:20)  HR: 81 (18 Nov 2020 12:59) (58 - 86)  BP: 124/67 (18 Nov 2020 12:59) (106/67 - 155/81)  BP(mean): --  RR: 20 (18 Nov 2020 12:59) (18 - 20)  SpO2: 94% (18 Nov 2020 13:00) (90% - 100%)    I&O's Summary    17 Nov 2020 07:01  -  18 Nov 2020 07:00  --------------------------------------------------------  IN: 0 mL / OUT: 1050 mL / NET: -1050 mL    18 Nov 2020 07:01  -  18 Nov 2020 13:56  --------------------------------------------------------  IN: 0 mL / OUT: 700 mL / NET: -700 mL          MEDICATIONS  (STANDING):  aspirin enteric coated 81 milliGRAM(s) Oral daily  atorvastatin 80 milliGRAM(s) Oral at bedtime  dexAMETHasone  Injectable 6 milliGRAM(s) IV Push daily  enoxaparin Study Injectable () 1 Dose(s) SubCutaneous two times a day  furosemide   Injectable 40 milliGRAM(s) IV Push every 12 hours  influenza   Vaccine 0.5 milliLiter(s) IntraMuscular once  lisinopril 20 milliGRAM(s) Oral daily  metoprolol tartrate 12.5 milliGRAM(s) Oral every 12 hours  pantoprazole    Tablet 40 milliGRAM(s) Oral before breakfast  QUEtiapine 25 milliGRAM(s) Oral at bedtime

## 2020-11-18 NOTE — PROGRESS NOTE ADULT - SUBJECTIVE AND OBJECTIVE BOX
DAILY PROGRESS NOTE  ===========================================================    Patient Information:  MIGUEL ALMODOVAR  /  75y  /  Male  /  MRN#: [id number]    Hospital Day: 8d     |:::::::::::::::::::::::::::| SUBJECTIVE |:::::::::::::::::::::::::::|    OVERNIGHT EVENTS: None  TODAY: Patient was seen today at bedside. Patient has no complaints this AM, feels that breathing has improved. Review of systems is otherwise negative.    |:::::::::::::::::::::::::::| OBJECTIVE |:::::::::::::::::::::::::::|    VITAL SIGNS: Last 24 Hours  T(C): 36.1 (18 Nov 2020 12:59), Max: 36.2 (18 Nov 2020 04:20)  T(F): 97 (18 Nov 2020 12:59), Max: 97.1 (18 Nov 2020 04:20)  HR: 81 (18 Nov 2020 12:59) (58 - 86)  BP: 124/67 (18 Nov 2020 12:59) (106/67 - 155/81)  BP(mean): --  RR: 20 (18 Nov 2020 12:59) (18 - 20)  SpO2: 94% (18 Nov 2020 12:59) (90% - 100%)    11-17-20 @ 07:01  -  11-18-20 @ 07:00  --------------------------------------------------------  IN: 0 mL / OUT: 1050 mL / NET: -1050 mL    11-18-20 @ 07:01  -  11-18-20 @ 13:28  --------------------------------------------------------  IN: 0 mL / OUT: 700 mL / NET: -700 mL      PHYSICAL EXAM:  GENERAL:   Awake, alert; NAD.  HEENT:  Head NC/AT; Conjunctivae pink, Sclera anicteric; Oral mucosa moist.  CARDIO:   Regular rate; Regular rhythm; S1 & S2.  RESP:   No rales, wheezing, or rhonchi appreciated. HFNC 50/70%  GI:   Soft; NT/ND; BS; No guarding; No rebound tenderness.  EXT:   Strength UE 5/5; Strength LE 5/5; No edema.   SKIN:   Intact.    LAB RESULTS:                        16.5   22.59 )-----------( 362      ( 18 Nov 2020 05:54 )             50.3     11-18    140  |  102  |  58<H>  ----------------------------<  116<H>  4.2   |  24  |  1.2    Ca    8.8      18 Nov 2020 05:54    TPro  5.7<L>  /  Alb  3.3<L>  /  TBili  0.7  /  DBili  x   /  AST  37  /  ALT  78<H>  /  AlkPhos  86  11-18      MICROBIOLOGY:    RADIOLOGY:    ALLERGIES: No Known Allergies      ===========================================================

## 2020-11-19 NOTE — PROGRESS NOTE ADULT - SUBJECTIVE AND OBJECTIVE BOX
DAILY PROGRESS NOTE  ===========================================================    Patient Information:  MIGUEL ALMODOVAR  /  75y  /  Male  /  MRN#: [id number]    Hospital Day: 9d     |:::::::::::::::::::::::::::| SUBJECTIVE |:::::::::::::::::::::::::::|    OVERNIGHT EVENTS: None  TODAY: Patient was seen today at bedside. Patient on nonrebreather alternating with BiPAP. Patient has no complaints this AM. Moved bowels yesterday night.    |:::::::::::::::::::::::::::| OBJECTIVE |:::::::::::::::::::::::::::|    VITAL SIGNS: Last 24 Hours  T(C): 35.9 (19 Nov 2020 08:00), Max: 36.1 (18 Nov 2020 12:59)  T(F): 96.7 (19 Nov 2020 08:00), Max: 97 (18 Nov 2020 12:59)  HR: 78 (19 Nov 2020 08:00) (61 - 81)  BP: 163/92 (19 Nov 2020 08:00) (124/67 - 163/92)  BP(mean): --  RR: 20 (19 Nov 2020 11:02) (18 - 25)  SpO2: 96% (19 Nov 2020 11:02) (91% - 98%)    11-18-20 @ 07:01  -  11-19-20 @ 07:00  --------------------------------------------------------  IN: 0 mL / OUT: 1500 mL / NET: -1500 mL    11-19-20 @ 07:01  -  11-19-20 @ 12:11  --------------------------------------------------------  IN: 0 mL / OUT: 300 mL / NET: -300 mL      PHYSICAL EXAM:  GENERAL:   Awake, alert; NAD.  HEENT:  Head NC/AT; Conjunctivae pink, Sclera anicteric; Oral mucosa moist.  CARDIO:   Regular rate; Regular rhythm; S1 & S2.  RESP:   + coarse breath sounds bl  GI:   Soft; NT/ND; BS; No guarding; No rebound tenderness.  EXT:   Strength UE 5/5; Strength LE 5/5; No edema.   SKIN:   Intact.    LAB RESULTS:                        16.5   21.87 )-----------( 284      ( 19 Nov 2020 06:36 )             50.5     11-19    139  |  103  |  58<H>  ----------------------------<  99  4.2   |  23  |  1.1    Ca    8.6      19 Nov 2020 06:36    TPro  5.5<L>  /  Alb  3.2<L>  /  TBili  0.9  /  DBili  x   /  AST  27  /  ALT  60<H>  /  AlkPhos  79  11-19    MICROBIOLOGY:    RADIOLOGY:    < from: Xray Chest 1 View- PORTABLE-Routine (Xray Chest 1 View- PORTABLE-Routine in AM.) (11.18.20 @ 05:39) >  Cardiomegaly with improving bilateral opacities.    < end of copied text >    ALLERGIES: No Known Allergies      ===========================================================

## 2020-11-19 NOTE — PROGRESS NOTE ADULT - SUBJECTIVE AND OBJECTIVE BOX
Patient is a 75y old  Male who presents with a chief complaint of covid-19 (19 Nov 2020 16:51)        Over Night Events:  on BiPAP this am.  Off pressors         ROS:     All ROS are negative except HPI         PHYSICAL EXAM    ICU Vital Signs Last 24 Hrs  T(C): 35.9 (19 Nov 2020 15:31), Max: 35.9 (19 Nov 2020 00:00)  T(F): 96.7 (19 Nov 2020 15:31), Max: 96.7 (19 Nov 2020 08:00)  HR: 84 (19 Nov 2020 18:22) (61 - 84)  BP: 114/65 (19 Nov 2020 18:22) (114/65 - 163/92)  BP(mean): --  ABP: --  ABP(mean): --  RR: 20 (19 Nov 2020 18:22) (18 - 25)  SpO2: 93% (19 Nov 2020 18:22) (91% - 98%)      CONSTITUTIONAL:  Well nourished.  NAD    ENT:   Airway patent,   Mouth with normal mucosa.   No thrush    EYES:   Pupils equal,   Round and reactive to light.    CARDIAC:   Normal rate,   Regular rhythm.    No edema      Vascular:  Normal systolic impulse  No Carotid bruits    RESPIRATORY:   No wheezing  Bilateral BS  Normal chest expansion  Not tachypneic,  No use of accessory muscles    GASTROINTESTINAL:  Abdomen soft,   Non-tender,   No guarding,   + BS    MUSCULOSKELETAL:   Range of motion is not limited,  No clubbing, cyanosis    NEUROLOGICAL:   Alert and oriented   No motor  deficits.    SKIN:   Skin normal color for race,   Warm and dry and intact.   No evidence of rash.    PSYCHIATRIC:   Normal mood and affect.   No apparent risk to self or others.    HEMATOLOGICAL:  No cervical  lymphadenopathy.  no inguinal lymphadenopathy      11-18-20 @ 07:01  -  11-19-20 @ 07:00  --------------------------------------------------------  IN:  Total IN: 0 mL    OUT:    Voided (mL): 1500 mL  Total OUT: 1500 mL    Total NET: -1500 mL      11-19-20 @ 07:01  -  11-19-20 @ 19:00  --------------------------------------------------------  IN:  Total IN: 0 mL    OUT:    Voided (mL): 800 mL  Total OUT: 800 mL    Total NET: -800 mL          LABS:                            16.5   21.87 )-----------( 284      ( 19 Nov 2020 06:36 )             50.5                                               11-19    139  |  103  |  58<H>  ----------------------------<  99  4.2   |  23  |  1.1    Ca    8.6      19 Nov 2020 06:36    TPro  5.5<L>  /  Alb  3.2<L>  /  TBili  0.9  /  DBili  x   /  AST  27  /  ALT  60<H>  /  AlkPhos  79  11-19                                                                                           LIVER FUNCTIONS - ( 19 Nov 2020 06:36 )  Alb: 3.2 g/dL / Pro: 5.5 g/dL / ALK PHOS: 79 U/L / ALT: 60 U/L / AST: 27 U/L / GGT: x                                                                                                                                       MEDICATIONS  (STANDING):  aspirin enteric coated 81 milliGRAM(s) Oral daily  atorvastatin 80 milliGRAM(s) Oral at bedtime  dexAMETHasone  Injectable 6 milliGRAM(s) IV Push daily  enoxaparin Study Injectable () 1 Dose(s) SubCutaneous two times a day  furosemide   Injectable 40 milliGRAM(s) IV Push every 12 hours  influenza   Vaccine 0.5 milliLiter(s) IntraMuscular once  lisinopril 20 milliGRAM(s) Oral daily  metoprolol tartrate 12.5 milliGRAM(s) Oral every 12 hours  pantoprazole    Tablet 40 milliGRAM(s) Oral before breakfast  QUEtiapine 25 milliGRAM(s) Oral at bedtime    MEDICATIONS  (PRN):      New X-rays reviewed:                                                                                  ECHO    CXR interpreted by me:

## 2020-11-19 NOTE — PROGRESS NOTE ADULT - SUBJECTIVE AND OBJECTIVE BOX
Patient is a 75y old  Male who presents with a chief complaint of covid-19 (17 Nov 2020 13:09)    HPI:   76 yo male, pmh of AAA s/p repari, htn, hld, cad, presents to ed for sob and severe weakness, Pt was dx with covid-19  10 days ago, today felt more weak and sob, ems found the pt to be hypoxic (10 Nov 2020 13:21)    PAST MEDICAL & SURGICAL HISTORY:  Abdominal aortic aneurysm  Essential hypertension  High cholesterol  Myocardial infarction  S/P aneurysm repair    patient seen and examined independently on morning rounds, chart reviewed and discussed with the medicine resident and on interdisciplinary rounds.    no overnight events-on bipap now- has dried nasal bleeding on face (from HF)               PE:  GEN-NAD, AAOx3  PULM-  fair air entry, decreased bs bilateral bases (anteriorly)  CVS- +s1/s2 RRR  GI- soft NT obese ND +bs, no rebound, no guarding  EXT-trace edema        Labs:                        16.5   21.87 )-----------( 284      ( 19 Nov 2020 06:36 )             50.5     CBC Full  -  ( 19 Nov 2020 06:36 )  WBC Count : 21.87 K/uL  RBC Count : 5.65 M/uL  Hemoglobin : 16.5 g/dL  Hematocrit : 50.5 %  Platelet Count - Automated : 284 K/uL  Mean Cell Volume : 89.4 fL  Mean Cell Hemoglobin : 29.2 pg  Mean Cell Hemoglobin Concentration : 32.7 g/dL  Auto Neutrophil # : 18.08 K/uL  Auto Lymphocyte # : 1.13 K/uL  Auto Monocyte # : 2.19 K/uL  Auto Eosinophil # : 0.03 K/uL  Auto Basophil # : 0.08 K/uL  Auto Neutrophil % : 82.7 %  Auto Lymphocyte % : 5.2 %  Auto Monocyte % : 10.0 %  Auto Eosinophil % : 0.1 %  Auto Basophil % : 0.4 %      11-19    139  |  103  |  58<H>  ----------------------------<  99  4.2   |  23  |  1.1    Ca    8.6      19 Nov 2020 06:36    TPro  5.5<L>  /  Alb  3.2<L>  /  TBili  0.9  /  DBili  x   /  AST  27  /  ALT  60<H>  /  AlkPhos  79  11-19      Microbiology:      Vital Signs Last 24 Hrs  T(C): 35.9 (19 Nov 2020 15:31), Max: 35.9 (19 Nov 2020 00:00)  T(F): 96.7 (19 Nov 2020 15:31), Max: 96.7 (19 Nov 2020 08:00)  HR: 73 (19 Nov 2020 15:31) (61 - 78)  BP: 124/70 (19 Nov 2020 15:31) (124/70 - 163/92)  BP(mean): --  RR: 19 (19 Nov 2020 15:31) (18 - 25)  SpO2: 96% (19 Nov 2020 11:02) (91% - 98%)    I&O's Summary    18 Nov 2020 07:01  -  19 Nov 2020 07:00  --------------------------------------------------------  IN: 0 mL / OUT: 1500 mL / NET: -1500 mL    19 Nov 2020 07:01  -  19 Nov 2020 16:56  --------------------------------------------------------  IN: 0 mL / OUT: 300 mL / NET: -300 mL

## 2020-11-19 NOTE — PROGRESS NOTE ADULT - ASSESSMENT
IMPRESSION:    Acute hypoxic respiratory failure   Sepsis present on admission  COVID 19 pneumonia   NSVT ( Echo 40%)      SUGGEST:      CNS: Avoid CNS depressant    HEENT: Oral care    PULMONARY:  HOB @ 45 degrees.  HHFNC alternate with BIPAP, keep Sao2 92 to 96%, Finish Decadron course.  Incentive spirometry     CARDIOVASCULAR: Negative balance as tolerated.      GI: GI prophylaxis.  Feeding as tolerated     RENAL:  Follow up lytes.  Correct as needed    INFECTIOUS DISEASE:  per ID    HEMATOLOGICAL:  AC    ENDOCRINE:  Follow up FS.  Insulin protocol if needed.    MUSCULOSKELETAL: bedrest    CEU  poor prognosis

## 2020-11-19 NOTE — PROGRESS NOTE ADULT - ASSESSMENT
a/p:  #acute hypoxic respiratory failure 2/2 covid-19 pneumonia  -continue airbourne contact isolation precautions  -continue managment as per SDU/CEU   -remains on HFNC--taper as tolerated--trend o2 sat- intermittent bipap  -monitor for any recurrent epistaxis---add humidifier for relief  -daily cxr and abg prn  -is on lovenox investigational study---will need duplex prior to dc   -Incentive spirometry  -monitor esr/crp and inflamm markers (check procal, lymphocytes)  -monitor wbc---suspect leukocytosis 2/2 steroid (wbc today 21k)  -cont dexamethasone 6 mg daily    DVT/GI ppx  FULL CODE    guarded prognosis--patient is high risk for cardiopulmonary complicated due to COVID 19 infection

## 2020-11-19 NOTE — PROGRESS NOTE ADULT - ASSESSMENT
74 yo male, pmh of AAA s/p repari, htn, hld, cad, presents to ed for sob and severe weakness, Pt was dx with covid-19  10 days ago, today felt more weak and sob, ems found the pt to be hypoxic.     #Acute hypoxic respiratory failure 2/2 COVID PNA  - COVID+  - s/p toci 11/11, RDV not indicated at this time  - BLE VA duplex 11/11 showed no acute DVT  - d-dimer 3262 > 979 > 698 > 473 11/157  - CRP 16.49 > 3.14 > 0.39 11/17  - Ferritin 3080 > 1516 > 1183 11/17  - c/w decadron (x 10 days to 11/20)  - Lovenox dose per HepCOVID research protocol  -- Will require LE duplex on day of discharge  - Alt HiFLO with BiPAP as tolerated: HFNC at 50/70%  - CXR and inflammatory markers q48h  - Goals of care: Remains in favor of life prolonging care (11/16)    #Sustained V.Tach  - 1 episode 11/11 ~11:30 pm on tele  - EKG midnight sinus tach with PACs   - Start lopressor 12.5 mg BID  - TTE 11/13: Ef 40%, grade I diastolic dysfunction     #AAA  - c/w ASA, statin    Diet - dash  Activity - as tolerated  DVT ppx - Lovenox dose as per research protocol  GI ppx - protonix  Dispo - CEU

## 2020-11-20 NOTE — PROGRESS NOTE ADULT - ASSESSMENT
74 yo male, pmh of AAA s/p repari, htn, hld, cad, presents to ed for sob and severe weakness, Pt was dx with covid-19  10 days ago, today felt more weak and sob, ems found the pt to be hypoxic.     #Acute hypoxic respiratory failure 2/2 COVID PNA  - COVID+  - s/p toci 11/11, RDV not indicated at this time  - BLE VA duplex 11/11 showed no acute DVT  - d-dimer 3262 > 979 > 698 > 473 11/157  - CRP 16.49 > 3.14 > 0.39 11/17  - Ferritin 3080 > 1516 > 1183 11/17  - c/w decadron (x 10 days to 11/20)  - Lovenox dose per HepCOVID research protocol  -- Will require LE duplex on day of discharge  - Alt HiFLO with BiPAP as tolerated: HFNC at 40/100%  - CXR and inflammatory markers q48h  - Goals of care: Remains in favor of life prolonging care (11/16)  - Patient has epistaxis from HFNC-> consult ENT if bleeding continues    #Sustained V.Tach  - 1 episode 11/11 ~11:30 pm on tele  - EKG midnight sinus tach with PACs   - Lopressor 12.5 mg BID  - TTE 11/13: Ef 40%, grade I diastolic dysfunction     #AAA  - c/w ASA, statin    Diet - dash  Activity - as tolerated  DVT ppx - Lovenox dose as per research protocol  GI ppx - protonix  Dispo - CEU   74 yo male, pmh of AAA s/p repari, htn, hld, cad, presents to ed for sob and severe weakness, Pt was dx with covid-19  10 days ago, today felt more weak and sob, ems found the pt to be hypoxic.     #Acute hypoxic respiratory failure 2/2 COVID PNA  - COVID+  - s/p toci 11/11, RDV not indicated at this time  - BLE VA duplex 11/11 showed no acute DVT  - d-dimer 3262 > 979 > 698 > 473 11/157  - CRP 16.49 > 3.14 > 0.39 11/17  - Ferritin 3080 > 1516 > 1183 11/17  - c/w decadron (x 10 days to 11/20), will continue beyond 10 days given little clinical improvement  - Lovenox dose per HepCOVID research protocol  -- Will require LE duplex on day of discharge  - Alt HiFLO with BiPAP as tolerated: HFNC at 40/100%  - CXR and inflammatory markers q48h  - Goals of care: Remains in favor of life prolonging care (11/16)  - Patient has epistaxis from HFNC-> consult ENT if bleeding continues    #Sustained V.Tach  - 1 episode 11/11 ~11:30 pm on tele  - EKG midnight sinus tach with PACs   - Lopressor 12.5 mg BID  - TTE 11/13: Ef 40%, grade I diastolic dysfunction     #AAA  - c/w ASA, statin    Diet - dash  Activity - as tolerated  DVT ppx - Lovenox dose as per research protocol  GI ppx - protonix  Dispo - CEU   74 yo male, pmh of AAA s/p repari, htn, hld, cad, presents to ed for sob and severe weakness, Pt was dx with covid-19  10 days ago, today felt more weak and sob, ems found the pt to be hypoxic.     #Acute hypoxic respiratory failure 2/2 COVID PNA  - COVID+  - s/p toci 11/11, RDV not indicated at this time  - BLE VA duplex 11/11 showed no acute DVT  - d-dimer 3262 > 979 > 698 > 473 11/157  - CRP 16.49 > 3.14 > 0.39 11/17  - Ferritin 3080 > 1516 > 1183 11/17  - c/w decadron (x 10 days to 11/20), will continue beyond 10 days given little clinical improvement  - Lovenox dose per HepCOVID research protocol  -- Will require LE duplex on day of discharge  - Alt HiFLO with BiPAP as tolerated: HFNC at 40/100%  - CXR and inflammatory markers q48h  - Goals of care: Remains in favor of life prolonging care (11/16)  - Patient has bilateral epistaxis from HFNC-> f/u ENT consult    #Sustained V.Tach  - 1 episode 11/11 ~11:30 pm on tele  - EKG midnight sinus tach with PACs   - Lopressor 12.5 mg BID  - TTE 11/13: Ef 40%, grade I diastolic dysfunction     #AAA  - c/w ASA, statin    Diet - dash  Activity - as tolerated  DVT ppx - Lovenox dose as per research protocol  GI ppx - protonix  Dispo - CEU   76 yo male, pmh of AAA s/p repari, htn, hld, cad, presents to ed for sob and severe weakness, Pt was dx with covid-19  10 days ago, today felt more weak and sob, ems found the pt to be hypoxic.     #Acute hypoxic respiratory failure 2/2 COVID PNA  - COVID+  - s/p toci 11/11, RDV not indicated at this time  - BLE VA duplex 11/11 showed no acute DVT  - d-dimer 3262 > 979 > 698 > 473 11/157  - CRP 16.49 > 3.14 > 0.39 11/17  - Ferritin 3080 > 1516 > 1183 11/17  - c/w decadron (x 10 days to 11/20), will continue beyond 10 days given little clinical improvement  - Lovenox dose per HepCOVID research protocol  -- Will require LE duplex on day of discharge  - Alt HiFLO with BiPAP as tolerated: HFNC at 40/100%  - CXR and inflammatory markers q48h  - Goals of care: Remains in favor of life prolonging care (11/16)  - Patient has bilateral epistaxis from HFNC-> f/u ENT consult  - Finding contact info for daughter Bonny who would like to be given daily updates, wife in chart has dementia    #Sustained V.Tach  - 1 episode 11/11 ~11:30 pm on tele  - EKG midnight sinus tach with PACs   - Lopressor 12.5 mg BID  - TTE 11/13: Ef 40%, grade I diastolic dysfunction     #AAA  - c/w ASA, statin    Diet - dash  Activity - as tolerated  DVT ppx - Lovenox dose as per research protocol  GI ppx - protonix  Dispo - CEU

## 2020-11-20 NOTE — PROGRESS NOTE ADULT - SUBJECTIVE AND OBJECTIVE BOX
DAILY PROGRESS NOTE  ===========================================================    Patient Information:  MIGUEL ALMODOVAR  /  75y  /  Male  /  MRN#: [id number]    Hospital Day: 10d     |:::::::::::::::::::::::::::| SUBJECTIVE |:::::::::::::::::::::::::::|    OVERNIGHT EVENTS: None  TODAY: Patient was seen today at bedside. No complaints this AM.  Review of systems is otherwise negative.    |:::::::::::::::::::::::::::| OBJECTIVE |:::::::::::::::::::::::::::|    VITAL SIGNS: Last 24 Hours  T(C): 36.9 (20 Nov 2020 08:35), Max: 36.9 (20 Nov 2020 08:35)  T(F): 98.4 (20 Nov 2020 08:35), Max: 98.4 (20 Nov 2020 08:35)  HR: 68 (20 Nov 2020 08:39) (66 - 85)  BP: 156/87 (20 Nov 2020 08:39) (114/65 - 158/94)  BP(mean): 96 (20 Nov 2020 08:35) (96 - 96)  RR: 20 (20 Nov 2020 08:35) (19 - 20)  SpO2: 94% (20 Nov 2020 11:21) (90% - 95%)    11-19-20 @ 07:01  -  11-20-20 @ 07:00  --------------------------------------------------------  IN: 0 mL / OUT: 800 mL / NET: -800 mL    11-20-20 @ 07:01  -  11-20-20 @ 11:42  --------------------------------------------------------  IN: 240 mL / OUT: 750 mL / NET: -510 mL      PHYSICAL EXAM:  GENERAL:   Awake, alert; NAD.  HEENT:  Head NC/AT; Conjunctivae pink, Sclera anicteric; Oral mucosa moist.  CARDIO:   Regular rate; Regular rhythm; S1 & S2.  RESP:   Diffuse rales bilaterally. HFNC 40/100%  GI:   Soft; NT/ND; BS; No guarding; No rebound tenderness.  EXT:   Strength UE 5/5; Strength LE 5/5; No edema.   SKIN:   Intact.    LAB RESULTS:                        16.4   21.81 )-----------( 266      ( 20 Nov 2020 05:40 )             50.2     11-20    137  |  101  |  58<H>  ----------------------------<  114<H>  4.2   |  25  |  1.1    Ca    8.3<L>      20 Nov 2020 05:40  Phos  3.3     11-20  Mg     2.6     11-20    TPro  5.2<L>  /  Alb  3.0<L>  /  TBili  0.9  /  DBili  x   /  AST  22  /  ALT  47<H>  /  AlkPhos  76  11-20      Troponin T, Serum: <0.01 ng/mL (11-19-20 @ 16:00)    CARDIAC MARKERS ( 19 Nov 2020 16:00 )  x     / <0.01 ng/mL / x     / x     / x          MICROBIOLOGY:    RADIOLOGY:      ALLERGIES: No Known Allergies      ===========================================================

## 2020-11-20 NOTE — PROGRESS NOTE ADULT - ASSESSMENT
a/p:  #acute hypoxic respiratory failure 2/2 SARS covid-19 pneumonia  -continue airbourne contact isolation precautions  -continue managment as per SDU/CEU   -remains on HFNC-add humidifier--decrease pressure/fiO2 as tolerated--intermittent bipap    -daily cxr and abg prn  -on lovenox investigational study---will need duplex prior to dc  -Incentive spirometry  -monitor esr/crp and inflamm markers (check procal, lymphocytes)  -monitor wbc---suspect leukocytosis 2/2 steroid (wbc stable today 21k)  -cont dexamethasone 6 mg daily    #epistaxis--from bipap/hfnc  -cont to monitor closely---ENT   -monitor for any recurrent epistaxis-ENT Eval if persists  -check coag--plt stable (266k)    DVT/GI ppx  FULL CODE    guarded prognosis--patient is high risk for cardiopulmonary complicated due to COVID 19 infection  called and spoke with family Damaris ([phone])- updated on status

## 2020-11-20 NOTE — PROGRESS NOTE ADULT - SUBJECTIVE AND OBJECTIVE BOX
Patient is a 75y old  Male who presents with a chief complaint of covid-19 (17 Nov 2020 13:09)    HPI:   74 yo male, pmh of AAA s/p repari, htn, hld, cad, presents to ed for sob and severe weakness, Pt was dx with covid-19  10 days ago, today felt more weak and sob, ems found the pt to be hypoxic (10 Nov 2020 13:21)    PAST MEDICAL & SURGICAL HISTORY:  Abdominal aortic aneurysm  Essential hypertension  High cholesterol  Myocardial infarction  S/P aneurysm repair    patient seen and examined independently on morning rounds, chart reviewed and discussed with the medicine resident and on interdisciplinary rounds.    no overnight events-persistant epistaxis (today more Left >right nare)---cont HF with humidifier               PE:  GEN-NAD, AAOx3  PULM-  fair air entry, decreased bs bilateral bases (anteriorly)  CVS- +s1/s2 RRR  GI- soft NT obese ND +bs, no rebound, no guarding, + suprapubic and lower abdominal ecchymosis   EXT-trace edema        Labs:                        16.4   21.81 )-----------( 266      ( 20 Nov 2020 05:40 )             50.2     CBC Full  -  ( 20 Nov 2020 05:40 )  WBC Count : 21.81 K/uL  RBC Count : 5.62 M/uL  Hemoglobin : 16.4 g/dL  Hematocrit : 50.2 %  Platelet Count - Automated : 266 K/uL  Mean Cell Volume : 89.3 fL  Mean Cell Hemoglobin : 29.2 pg  Mean Cell Hemoglobin Concentration : 32.7 g/dL  Auto Neutrophil # : 18.11 K/uL  Auto Lymphocyte # : 1.06 K/uL  Auto Monocyte # : 2.12 K/uL  Auto Eosinophil # : 0.02 K/uL  Auto Basophil # : 0.08 K/uL  Auto Neutrophil % : 83.0 %  Auto Lymphocyte % : 4.9 %  Auto Monocyte % : 9.7 %  Auto Eosinophil % : 0.1 %  Auto Basophil % : 0.4 %      11-20    137  |  101  |  58<H>  ----------------------------<  114<H>  4.2   |  25  |  1.1    Ca    8.3<L>      20 Nov 2020 05:40  Phos  3.3     11-20  Mg     2.6     11-20    TPro  5.2<L>  /  Alb  3.0<L>  /  TBili  0.9  /  DBili  x   /  AST  22  /  ALT  47<H>  /  AlkPhos  76  11-20      Microbiology:      Vital Signs Last 24 Hrs  T(C): 36 (20 Nov 2020 12:20), Max: 36.9 (20 Nov 2020 08:35)  T(F): 96.8 (20 Nov 2020 12:20), Max: 98.4 (20 Nov 2020 08:35)  HR: 95 (20 Nov 2020 12:20) (66 - 95)  BP: 133/71 (20 Nov 2020 12:20) (114/65 - 158/94)  BP(mean): 95 (20 Nov 2020 12:20) (95 - 96)  RR: 20 (20 Nov 2020 12:20) (19 - 20)  SpO2: 94% (20 Nov 2020 11:21) (90% - 95%)    I&O's Summary    19 Nov 2020 07:01  -  20 Nov 2020 07:00  --------------------------------------------------------  IN: 0 mL / OUT: 800 mL / NET: -800 mL    20 Nov 2020 07:01  -  20 Nov 2020 14:05  --------------------------------------------------------  IN: 240 mL / OUT: 750 mL / NET: -510 mL

## 2020-11-20 NOTE — PROGRESS NOTE ADULT - ASSESSMENT
IMPRESSION:    Acute hypoxic respiratory failure   Sepsis present on admission  COVID 19 pneumonia   HFMREF      SUGGEST:      CNS: Avoid CNS depressant    HEENT: Oral care    PULMONARY:  HOB @ 45 degrees.  HHFNC alternate with BIPAP, keep Sao2 92 to 96%, Finish Decadron course.  Incentive spirometry     CARDIOVASCULAR: Negative balance as tolerated.      GI: GI prophylaxis.  Feeding as tolerated     RENAL:  Follow up lytes.  Correct as needed    INFECTIOUS DISEASE:  per ID    HEMATOLOGICAL:  AC    ENDOCRINE:  Follow up FS.  Insulin protocol if needed.    MUSCULOSKELETAL: bedrest    CEU  poor prognosis

## 2020-11-20 NOTE — CONSULT NOTE ADULT - SUBJECTIVE AND OBJECTIVE BOX
ENT DAILY PROGRESS NOTE    Overnight events/Interval HPI: HPI:   76 yo male, pmh of AAA s/p repari, htn, hld, cad, presented to ed for sob and severe weakness, found to be COVID +. Pt was on Bipap for 1 week and is now currently on humidified high flow O2 via NC. Has been having frequent b/l epistaxis which is not profuse and usually self limiting.       REVIEW OF SYSTEMS   [x] A ten-point review of systems was otherwise negative except as noted.  [ ] Due to altered mental status/intubation, subjective information were not able to be obtained from patient. History was obtained, to the extent possible, from review of the chart and collateral sources of information.    Allergies    No Known Allergies    Intolerances        MEDICATIONS:  Antiinfectives:     IV fluids:    Hematologic/Anticoagulation:  aspirin enteric coated 81 milliGRAM(s) Oral daily    Pain medications/Neuro:  QUEtiapine 25 milliGRAM(s) Oral at bedtime    Endocrine Medications:   atorvastatin 80 milliGRAM(s) Oral at bedtime    All other standing medications:   enoxaparin Study Injectable () 1 Dose(s) SubCutaneous two times a day  furosemide   Injectable 40 milliGRAM(s) IV Push every 12 hours  influenza   Vaccine 0.5 milliLiter(s) IntraMuscular once  lisinopril 20 milliGRAM(s) Oral daily  metoprolol tartrate 12.5 milliGRAM(s) Oral every 12 hours  pantoprazole    Tablet 40 milliGRAM(s) Oral before breakfast    All other PRN medications:      Vital Signs Last 24 Hrs  T(C): 36 (20 Nov 2020 12:20), Max: 36.9 (20 Nov 2020 08:35)  T(F): 96.8 (20 Nov 2020 12:20), Max: 98.4 (20 Nov 2020 08:35)  HR: 95 (20 Nov 2020 12:20) (66 - 95)  BP: 133/71 (20 Nov 2020 12:20) (114/65 - 158/94)  BP(mean): 95 (20 Nov 2020 12:20) (95 - 96)  RR: 20 (20 Nov 2020 12:20) (19 - 20)  SpO2: 94% (20 Nov 2020 11:21) (90% - 95%)      11-19 @ 07:01  -  11-20 @ 07:00  --------------------------------------------------------  IN:  Total IN: 0 mL    OUT:    Voided (mL): 800 mL  Total OUT: 800 mL    Total NET: -800 mL      11-20 @ 07:01  -  11-20 @ 14:58  --------------------------------------------------------  IN:    Oral Fluid: 480 mL  Total IN: 480 mL    OUT:    Voided (mL): 1050 mL  Total OUT: 1050 mL    Total NET: -570 mL            PHYSICAL EXAM:    ENT EXAM-   Constitutional: Well-developed, well-nourished.  No hoarseness.     Head:  normocephalic, atraumatic.   Nose: dry mucosa, no active, bleed. On NC.  OC/OP: Floor of mouth, buccal mucosa, lips, hard palate, soft palate, uvula, posterior pharyngeal wall normal.  Mucosa moist.  Neck:  Trachea midline.  Thyroid, parotid and submandibular glands normal.  Lymph:  No cervical adenopathy.      LABS:  CBC-                        16.4   21.81 )-----------( 266      ( 20 Nov 2020 05:40 )             50.2     BMP/CMP-  20 Nov 2020 05:40    137    |  101    |  58     ----------------------------<  114    4.2     |  25     |  1.1      Ca    8.3        20 Nov 2020 05:40  Phos  3.3       20 Nov 2020 05:40  Mg     2.6       20 Nov 2020 05:40    TPro  5.2    /  Alb  3.0    /  TBili  0.9    /  DBili  x      /  AST  22     /  ALT  47     /  AlkPhos  76     20 Nov 2020 05:40    Coagulation Studies-    Endocrine Panel-  Calcium, Total Serum: 8.3 mg/dL (11-20 @ 05:40)

## 2020-11-20 NOTE — PROGRESS NOTE ADULT - SUBJECTIVE AND OBJECTIVE BOX
Patient is a 75y old  Male who presents with a chief complaint of covid-19 (20 Nov 2020 14:58)        Over Night Events:  On HFNCO2.  Off pressors         ROS:     All ROS are negative except HPI         PHYSICAL EXAM    ICU Vital Signs Last 24 Hrs  T(C): 36.2 (20 Nov 2020 16:07), Max: 36.9 (20 Nov 2020 08:35)  T(F): 97.2 (20 Nov 2020 16:07), Max: 98.4 (20 Nov 2020 08:35)  HR: 82 (20 Nov 2020 16:07) (66 - 95)  BP: 117/73 (20 Nov 2020 16:07) (114/65 - 158/94)  BP(mean): 95 (20 Nov 2020 12:20) (95 - 96)  ABP: --  ABP(mean): --  RR: 18 (20 Nov 2020 16:07) (18 - 20)  SpO2: 95% (20 Nov 2020 16:07) (90% - 95%)      CONSTITUTIONAL:  Well nourished.  NAD    ENT:   Airway patent,   Mouth with normal mucosa.   No thrush    EYES:   Pupils equal,   Round and reactive to light.    CARDIAC:   Normal rate,   Regular rhythm.    No edema      Vascular:  Normal systolic impulse  No Carotid bruits    RESPIRATORY:   No wheezing  Bilateral crackles   Normal chest expansion  Not tachypneic,  No use of accessory muscles    GASTROINTESTINAL:  Abdomen soft,   Non-tender,   No guarding,   + BS    MUSCULOSKELETAL:   Range of motion is not limited,  No clubbing, cyanosis    NEUROLOGICAL:   Alert and oriented   No motor  deficits.    SKIN:   Skin normal color for race,   Warm  No evidence of rash.    PSYCHIATRIC:   Normal mood and affect.   No apparent risk to self or others.    HEMATOLOGICAL:  No cervical  lymphadenopathy.  no inguinal lymphadenopathy      11-19-20 @ 07:01  -  11-20-20 @ 07:00  --------------------------------------------------------  IN:  Total IN: 0 mL    OUT:    Voided (mL): 800 mL  Total OUT: 800 mL    Total NET: -800 mL      11-20-20 @ 07:01  -  11-20-20 @ 16:30  --------------------------------------------------------  IN:    Oral Fluid: 480 mL  Total IN: 480 mL    OUT:    Voided (mL): 1050 mL  Total OUT: 1050 mL    Total NET: -570 mL          LABS:                            16.4   21.81 )-----------( 266      ( 20 Nov 2020 05:40 )             50.2                                               11-20    137  |  101  |  58<H>  ----------------------------<  114<H>  4.2   |  25  |  1.1    Ca    8.3<L>      20 Nov 2020 05:40  Phos  3.3     11-20  Mg     2.6     11-20    TPro  5.2<L>  /  Alb  3.0<L>  /  TBili  0.9  /  DBili  x   /  AST  22  /  ALT  47<H>  /  AlkPhos  76  11-20                                                 CARDIAC MARKERS ( 19 Nov 2020 16:00 )  x     / <0.01 ng/mL / x     / x     / x                                                LIVER FUNCTIONS - ( 20 Nov 2020 05:40 )  Alb: 3.0 g/dL / Pro: 5.2 g/dL / ALK PHOS: 76 U/L / ALT: 47 U/L / AST: 22 U/L / GGT: x                                                                                                                                       MEDICATIONS  (STANDING):  aspirin enteric coated 81 milliGRAM(s) Oral daily  atorvastatin 80 milliGRAM(s) Oral at bedtime  enoxaparin Study Injectable () 1 Dose(s) SubCutaneous two times a day  furosemide   Injectable 40 milliGRAM(s) IV Push every 12 hours  influenza   Vaccine 0.5 milliLiter(s) IntraMuscular once  lisinopril 20 milliGRAM(s) Oral daily  metoprolol tartrate 12.5 milliGRAM(s) Oral every 12 hours  pantoprazole    Tablet 40 milliGRAM(s) Oral before breakfast  QUEtiapine 25 milliGRAM(s) Oral at bedtime    MEDICATIONS  (PRN):      New X-rays reviewed:                                                                                  ECHO    CXR interpreted by me:

## 2020-11-20 NOTE — CONSULT NOTE ADULT - ASSESSMENT
74 y/o male with frequent b/l epistaxis secondary to high flow O2 via NC. - Self limiting.  - Bacitracin to b/l nares 3-4x daily.  - NS sprays  - Humidified O2  - Avoid nose blowing, sneezing, coughing, strenuous activity.  - Will discuss with attending 76 y/o male with frequent b/l epistaxis secondary to high flow O2 via NC. - Self limiting.  - Bacitracin to b/l nares 3-4x daily.  - NS sprays  - Humidified O2  - Avoid nose blowing, sneezing, coughing, strenuous activity.  - Will discuss with attending

## 2020-11-21 NOTE — PROGRESS NOTE ADULT - SUBJECTIVE AND OBJECTIVE BOX
DAILY PROGRESS NOTE  ===========================================================    Patient Information:  MIGUEL ALMODOVAR  /  75y  /  Male  /  MRN#: [id number]    Hospital Day: 11d     |:::::::::::::::::::::::::::| SUBJECTIVE |:::::::::::::::::::::::::::|    OVERNIGHT EVENTS: Patient had poor sleep, c/o back pain and GERD  TODAY: Review of systems otherwise negative.    |:::::::::::::::::::::::::::| OBJECTIVE |:::::::::::::::::::::::::::|    VITAL SIGNS: Last 24 Hours  T(C): 37 (21 Nov 2020 08:00), Max: 37 (21 Nov 2020 08:00)  T(F): 98.6 (21 Nov 2020 08:00), Max: 98.6 (21 Nov 2020 08:00)  HR: 67 (21 Nov 2020 08:00) (55 - 96)  BP: 127/60 (21 Nov 2020 08:00) (117/73 - 143/83)  BP(mean): 95 (20 Nov 2020 12:20) (95 - 95)  RR: 19 (21 Nov 2020 08:00) (18 - 22)  SpO2: 98% (21 Nov 2020 04:00) (94% - 100%)    11-20-20 @ 07:01  -  11-21-20 @ 07:00  --------------------------------------------------------  IN: 480 mL / OUT: 1050 mL / NET: -570 mL      PHYSICAL EXAM:  GENERAL:   Awake, alert; NAD.  HEENT:  Head NC/AT; Conjunctivae pink, Sclera anicteric; Oral mucosa moist.  CARDIO:   Regular rate; Regular rhythm; S1 & S2.  RESP:   No rales, wheezing, or rhonchi appreciated. On BiPAP  GI:   Soft; NT/ND; BS; No guarding; No rebound tenderness.  EXT:   Strength UE 5/5; Strength LE 5/5; No edema.   SKIN:   Intact.    LAB RESULTS:                        16.5   33.87 )-----------( 243      ( 21 Nov 2020 06:29 )             49.9     11-21    134<L>  |  98  |  58<H>  ----------------------------<  105<H>  4.7   |  23  |  1.3    Ca    8.6      21 Nov 2020 06:29  Phos  3.3     11-20  Mg     2.6     11-21    TPro  5.4<L>  /  Alb  3.0<L>  /  TBili  1.6<H>  /  DBili  x   /  AST  26  /  ALT  45<H>  /  AlkPhos  91  11-21    PT/INR - ( 21 Nov 2020 06:29 )   PT: 11.60 sec;   INR: 1.01 ratio         PTT - ( 20 Nov 2020 17:53 )  PTT:31.4 sec        CARDIAC MARKERS ( 19 Nov 2020 16:00 )  x     / <0.01 ng/mL / x     / x     / x          MICROBIOLOGY:    RADIOLOGY:    ALLERGIES: No Known Allergies      ===========================================================

## 2020-11-21 NOTE — PROGRESS NOTE ADULT - ASSESSMENT
74 yo male, pmh of AAA s/p repari, htn, hld, cad, presents to ed for sob and severe weakness, Pt was dx with covid-19  10 days ago, today felt more weak and sob, ems found the pt to be hypoxic.     #Acute hypoxic respiratory failure 2/2 COVID PNA  - COVID+  - s/p toci 11/11, RDV not indicated at this time  - BLE VA duplex 11/11 showed no acute DVT  - d-dimer 3262 > 979 > 698 > 473 11/157  - CRP 16.49 > 3.14 > 0.39 11/17  - Ferritin 3080 > 1516 > 1183 11/17  - c/w decadron (x 10 days to 11/20), will continue beyond 10 days given little clinical improvement  - Lovenox dose per HepCOVID research protocol  -- Will require LE duplex on day of discharge  - Alt HiFLO with BiPAP as tolerated  - CXR and inflammatory markers q48h  - Goals of care: Remains in favor of life prolonging care (11/16)  - ENT Consult for epistaxis 2/2 HFNC: Bacitracin to b/l nares 3-4x daily, NS sprays, Humidified O2  - Finding contact info for daughter Bonny who would like to be given daily updates, wife in chart has dementia    #Sustained V.Tach  - 1 episode 11/11 ~11:30 pm on tele  - EKG midnight sinus tach with PACs   - Lopressor 12.5 mg BID  - TTE 11/13: Ef 40%, grade I diastolic dysfunction     #AAA  - c/w ASA, statin    #Insomnia  - Increased to seroquel 50    GI ppx:  [] Not indicated  /  [x] Pantoprazole 40mg PO Daily  DVT ppx: [] Not indicated  /  [] Heparin 5000mg SubQ  /  [x] Lovenox research protocol  /  [] SCDs  Fluids:  [x] PO  /  [] IVF  Activity:  [] Increase as Tolerated  /  [x] OOB w/ assist  /  [] Bed Rest  Diet:  [x] DASH / [] Carb Consistent  /  [] Renal  /  [] Dysphagia  /  [] NPO  Patient to be discharged when condition(s) optimized:  [x] Home  / [] SNF  /  [] Long Term Rehab    CODE STATUS:   [x] FULL   /   [] DNR

## 2020-11-21 NOTE — PROGRESS NOTE ADULT - ASSESSMENT
IMPRESSION:    Acute hypoxic respiratory failure   Sepsis present on admission  COVID 19 pneumonia   HFMREF      SUGGEST:      CNS: Avoid CNS depressant.    HEENT: Oral care    PULMONARY:  HOB @ 45 degrees.  HHFNC alternate with BIPAP, keep Sao2 92 to 96%, Incentive spirometry     CARDIOVASCULAR:  Avoid volume overload.  Decrease Lasix to Q24     GI: GI prophylaxis.  Feeding as tolerated     RENAL:  Follow up lytes.  Correct as needed    INFECTIOUS DISEASE:  per ID.  Repeat Procal     HEMATOLOGICAL:  AC    ENDOCRINE:  Follow up FS.  Insulin protocol if needed.    MUSCULOSKELETAL: bedrest    CEU  poor prognosis IMPRESSION:    Acute hypoxic respiratory failure   Sepsis present on admission  COVID 19 pneumonia   HFMREF      SUGGEST:      CNS: Avoid CNS depressant.  Seroquel 50 mg     HEENT: Oral care    PULMONARY:  HOB @ 45 degrees.  HHFNC alternate with BIPAP, keep Sao2 92 to 96%, Incentive spirometry     CARDIOVASCULAR:  Avoid volume overload.  Decrease Lasix to Q24     GI: GI prophylaxis.  Feeding as tolerated     RENAL:  Follow up lytes.  Correct as needed    INFECTIOUS DISEASE:  per ID.  Repeat Procal     HEMATOLOGICAL:  AC    ENDOCRINE:  Follow up FS.  Insulin protocol if needed.    MUSCULOSKELETAL: bedrest    CEU  poor prognosis

## 2020-11-21 NOTE — PROGRESS NOTE ADULT - ASSESSMENT
a/p:  #acute hypoxic respiratory failure 2/2 SARS covid-19 pneumonia  -continue airbourne contact isolation precautions  -continue managment as per SDU/CEU   -s/p remdesivir x 5 days, toci x 2 doses  -remains on HFNC-with humidifier--decrease O2 suppl as tolerated  --intermittent bipap  -daily cxr and abg prn  -on lovenox investigational study---will need duplex prior to dc  -Incentive spirometry  -monitor esr/crp and inflamm markers  -wbc up to 33k---->repeat procal   -monitor wbc  -cont dexamethasone 6 mg daily    #epistaxis--from bipap/hfnc  -improving  -cont to monitor closely---ENT follow up  -monitor for any recurrence  -coag and plt stable (266k)    DVT/GI ppx  FULL CODE    guarded prognosis--patient is high risk for cardiopulmonary complicated due to COVID 19 infection  called and spoke with family Bonny (daugther)---[phone]  updated on status and advised to facetime with father

## 2020-11-21 NOTE — PROGRESS NOTE ADULT - SUBJECTIVE AND OBJECTIVE BOX
Patient is a 75y old  Male who presents with a chief complaint of covid-19 (17 Nov 2020 13:09)    HPI:   74 yo male, pmh of AAA s/p repari, htn, hld, cad, presents to ed for sob and severe weakness, Pt was dx with covid-19  10 days ago, today felt more weak and sob, ems found the pt to be hypoxic (10 Nov 2020 13:21)    PAST MEDICAL & SURGICAL HISTORY:  Abdominal aortic aneurysm  Essential hypertension  High cholesterol  Myocardial infarction  S/P aneurysm repair    patient seen and examined independently on morning rounds, chart reviewed and discussed with the medicine resident and on interdisciplinary rounds.    no overnight events-resolved epistaxis-on HF 40/80%- called and spoke with daughter Bonny [phone]- updated her on status                PE:  GEN-NAD, AAOx3  PULM-  fair air entry, decreased bs bilateral bases (anteriorly)  CVS- +s1/s2 RRR  GI- soft NT obese ND +bs, no rebound, no guarding, + suprapubic and lower abdominal ecchymosis   EXT-trace edema        Labs:                        16.5   33.87 )-----------( 243      ( 21 Nov 2020 06:29 )             49.9     CBC Full  -  ( 21 Nov 2020 06:29 )  WBC Count : 33.87 K/uL  RBC Count : 5.52 M/uL  Hemoglobin : 16.5 g/dL  Hematocrit : 49.9 %  Platelet Count - Automated : 243 K/uL  Mean Cell Volume : 90.4 fL  Mean Cell Hemoglobin : 29.9 pg  Mean Cell Hemoglobin Concentration : 33.1 g/dL  Auto Neutrophil # : 31.50 K/uL  Auto Lymphocyte # : 0.00 K/uL  Auto Monocyte # : 2.07 K/uL  Auto Eosinophil # : 0.00 K/uL  Auto Basophil # : 0.00 K/uL  Auto Neutrophil % : 93.0 %  Auto Lymphocyte % : 0.0 %  Auto Monocyte % : 6.1 %  Auto Eosinophil % : 0.0 %  Auto Basophil % : 0.0 %      11-21    134<L>  |  98  |  58<H>  ----------------------------<  105<H>  4.7   |  23  |  1.3    Ca    8.6      21 Nov 2020 06:29  Phos  3.3     11-20  Mg     2.6     11-21    TPro  5.4<L>  /  Alb  3.0<L>  /  TBili  1.6<H>  /  DBili  x   /  AST  26  /  ALT  45<H>  /  AlkPhos  91  11-21      Microbiology:      Vital Signs Last 24 Hrs  T(C): 37 (21 Nov 2020 08:00), Max: 37 (21 Nov 2020 08:00)  T(F): 98.6 (21 Nov 2020 08:00), Max: 98.6 (21 Nov 2020 08:00)  HR: 67 (21 Nov 2020 08:00) (55 - 96)  BP: 127/60 (21 Nov 2020 08:00) (117/73 - 143/83)  BP(mean): --  RR: 19 (21 Nov 2020 08:00) (18 - 22)  SpO2: 93% (21 Nov 2020 11:57) (93% - 100%)    I&O's Summary    20 Nov 2020 07:01  -  21 Nov 2020 07:00  --------------------------------------------------------  IN: 480 mL / OUT: 1050 mL / NET: -570 mL    21 Nov 2020 07:01  -  21 Nov 2020 13:10  --------------------------------------------------------  IN: 360 mL / OUT: 0 mL / NET: 360 mL

## 2020-11-21 NOTE — PROGRESS NOTE ADULT - SUBJECTIVE AND OBJECTIVE BOX
Patient is a 75y old  Male who presents with a chief complaint of covid-19 (20 Nov 2020 16:30)        Over Night Events:  on HFNCO2.  Off pressors         ROS:     All ROS are negative except HPI         PHYSICAL EXAM    ICU Vital Signs Last 24 Hrs  T(C): 36.4 (21 Nov 2020 00:00), Max: 36.9 (20 Nov 2020 08:35)  T(F): 97.6 (21 Nov 2020 00:00), Max: 98.4 (20 Nov 2020 08:35)  HR: 87 (21 Nov 2020 04:00) (55 - 96)  BP: 143/83 (21 Nov 2020 04:00) (117/73 - 156/87)  BP(mean): 95 (20 Nov 2020 12:20) (95 - 96)  ABP: --  ABP(mean): --  RR: 20 (21 Nov 2020 04:00) (18 - 22)  SpO2: 98% (21 Nov 2020 04:00) (93% - 100%)      CONSTITUTIONAL:  Well nourished.  NAD    ENT:   Airway patent,   Mouth with normal mucosa.   No thrush    EYES:   Pupils equal,   Round and reactive to light.    CARDIAC:   Normal rate,   Regular rhythm.    No edema      Vascular:  Normal systolic impulse  No Carotid bruits    RESPIRATORY:   No wheezing  Bilateral crackles   Normal chest expansion  Not tachypneic,  No use of accessory muscles    GASTROINTESTINAL:  Abdomen soft,   Non-tender,   No guarding,   + BS    MUSCULOSKELETAL:   Range of motion is not limited,  No clubbing, cyanosis    NEUROLOGICAL:   Alert and oriented   No motor  deficits.    SKIN:   Skin normal color for race,   Warm and dry and intact.   No evidence of rash.    PSYCHIATRIC:   Normal mood and affect.   No apparent risk to self or others.    HEMATOLOGICAL:  No cervical  lymphadenopathy.  no inguinal lymphadenopathy      11-20-20 @ 07:01  -  11-21-20 @ 07:00  --------------------------------------------------------  IN:    Oral Fluid: 480 mL  Total IN: 480 mL    OUT:    Voided (mL): 1050 mL  Total OUT: 1050 mL    Total NET: -570 mL          LABS:                            16.4   21.81 )-----------( 266      ( 20 Nov 2020 05:40 )             50.2                                               11-20    137  |  101  |  58<H>  ----------------------------<  114<H>  4.2   |  25  |  1.1    Ca    8.3<L>      20 Nov 2020 05:40  Phos  3.3     11-20  Mg     2.6     11-20    TPro  5.2<L>  /  Alb  3.0<L>  /  TBili  0.9  /  DBili  x   /  AST  22  /  ALT  47<H>  /  AlkPhos  76  11-20      PT/INR - ( 20 Nov 2020 17:53 )   PT: 11.70 sec;   INR: 1.02 ratio         PTT - ( 20 Nov 2020 17:53 )  PTT:31.4 sec                                           CARDIAC MARKERS ( 19 Nov 2020 16:00 )  x     / <0.01 ng/mL / x     / x     / x                                                LIVER FUNCTIONS - ( 20 Nov 2020 05:40 )  Alb: 3.0 g/dL / Pro: 5.2 g/dL / ALK PHOS: 76 U/L / ALT: 47 U/L / AST: 22 U/L / GGT: x                                                                                                                                       MEDICATIONS  (STANDING):  aspirin enteric coated 81 milliGRAM(s) Oral daily  atorvastatin 80 milliGRAM(s) Oral at bedtime  enoxaparin Study Injectable () 1 Dose(s) SubCutaneous two times a day  furosemide   Injectable 40 milliGRAM(s) IV Push every 12 hours  influenza   Vaccine 0.5 milliLiter(s) IntraMuscular once  lisinopril 20 milliGRAM(s) Oral daily  metoprolol tartrate 12.5 milliGRAM(s) Oral every 12 hours  pantoprazole    Tablet 40 milliGRAM(s) Oral before breakfast  QUEtiapine 25 milliGRAM(s) Oral at bedtime    MEDICATIONS  (PRN):      New X-rays reviewed:                                                                                  ECHO    CXR interpreted by me:  Bilateral infiltrates      Patient is a 75y old  Male who presents with a chief complaint of covid-19 (20 Nov 2020 16:30)        Over Night Events:  on HFNCO2 50 liters 100 % .  Off pressors         ROS:     All ROS are negative except HPI         PHYSICAL EXAM    ICU Vital Signs Last 24 Hrs  T(C): 36.4 (21 Nov 2020 00:00), Max: 36.9 (20 Nov 2020 08:35)  T(F): 97.6 (21 Nov 2020 00:00), Max: 98.4 (20 Nov 2020 08:35)  HR: 87 (21 Nov 2020 04:00) (55 - 96)  BP: 143/83 (21 Nov 2020 04:00) (117/73 - 156/87)  BP(mean): 95 (20 Nov 2020 12:20) (95 - 96)  ABP: --  ABP(mean): --  RR: 20 (21 Nov 2020 04:00) (18 - 22)  SpO2: 98% (21 Nov 2020 04:00) (93% - 100%)      CONSTITUTIONAL:  Well nourished.  NAD    ENT:   Airway patent,   Mouth with normal mucosa.   No thrush    EYES:   Pupils equal,   Round and reactive to light.    CARDIAC:   Normal rate,   Regular rhythm.    No edema      Vascular:  Normal systolic impulse  No Carotid bruits    RESPIRATORY:   No wheezing  Bilateral crackles   Normal chest expansion  Not tachypneic,  No use of accessory muscles    GASTROINTESTINAL:  Abdomen soft,   Non-tender,   No guarding,   + BS    MUSCULOSKELETAL:   Range of motion is not limited,  No clubbing, cyanosis    NEUROLOGICAL:   Alert and oriented   No motor  deficits.    SKIN:   Skin normal color for race,   Warm and dry and intact.   No evidence of rash.    PSYCHIATRIC:   Normal mood and affect.   No apparent risk to self or others.    HEMATOLOGICAL:  No cervical  lymphadenopathy.  no inguinal lymphadenopathy      11-20-20 @ 07:01  -  11-21-20 @ 07:00  --------------------------------------------------------  IN:    Oral Fluid: 480 mL  Total IN: 480 mL    OUT:    Voided (mL): 1050 mL  Total OUT: 1050 mL    Total NET: -570 mL          LABS:                            16.4   21.81 )-----------( 266      ( 20 Nov 2020 05:40 )             50.2                                               11-20    137  |  101  |  58<H>  ----------------------------<  114<H>  4.2   |  25  |  1.1    Ca    8.3<L>      20 Nov 2020 05:40  Phos  3.3     11-20  Mg     2.6     11-20    TPro  5.2<L>  /  Alb  3.0<L>  /  TBili  0.9  /  DBili  x   /  AST  22  /  ALT  47<H>  /  AlkPhos  76  11-20      PT/INR - ( 20 Nov 2020 17:53 )   PT: 11.70 sec;   INR: 1.02 ratio         PTT - ( 20 Nov 2020 17:53 )  PTT:31.4 sec                                           CARDIAC MARKERS ( 19 Nov 2020 16:00 )  x     / <0.01 ng/mL / x     / x     / x                                                LIVER FUNCTIONS - ( 20 Nov 2020 05:40 )  Alb: 3.0 g/dL / Pro: 5.2 g/dL / ALK PHOS: 76 U/L / ALT: 47 U/L / AST: 22 U/L / GGT: x                                                                                                                                       MEDICATIONS  (STANDING):  aspirin enteric coated 81 milliGRAM(s) Oral daily  atorvastatin 80 milliGRAM(s) Oral at bedtime  enoxaparin Study Injectable () 1 Dose(s) SubCutaneous two times a day  furosemide   Injectable 40 milliGRAM(s) IV Push every 12 hours  influenza   Vaccine 0.5 milliLiter(s) IntraMuscular once  lisinopril 20 milliGRAM(s) Oral daily  metoprolol tartrate 12.5 milliGRAM(s) Oral every 12 hours  pantoprazole    Tablet 40 milliGRAM(s) Oral before breakfast  QUEtiapine 25 milliGRAM(s) Oral at bedtime    MEDICATIONS  (PRN):      New X-rays reviewed:                                                                                  ECHO    CXR interpreted by me:  Bilateral infiltrates

## 2020-11-22 NOTE — PHYSICAL THERAPY INITIAL EVALUATION ADULT - CRITERIA FOR SKILLED THERAPEUTIC INTERVENTIONS
functional limitations in following categories/anticipated equipment needs at discharge/impairments found/predicted duration of therapy intervention

## 2020-11-22 NOTE — PHYSICAL THERAPY INITIAL EVALUATION ADULT - IMPAIRMENTS FOUND, PT EVAL
muscle strength/aerobic capacity/endurance/gross motor/gait, locomotion, and balance/integumentary integrity/joint integrity and mobility/ergonomics and body mechanics

## 2020-11-22 NOTE — PROGRESS NOTE ADULT - ASSESSMENT
IMPRESSION:    Acute hypoxic respiratory failure   Sepsis present on admission  COVID 19 pneumonia   HFMREF  Possible superimposed infection     SUGGEST:      CNS: Avoid CNS depressant.  Seroquel 50 mg     HEENT: Oral care    PULMONARY:  HOB @ 45 degrees.  HHFNC alternate with BIPAP, keep Sao2 92 to 96%, Incentive spirometry     CARDIOVASCULAR:  Avoid volume overload.  I=O    GI: GI prophylaxis.  Feeding as tolerated     RENAL:  Follow up lytes.  Correct as needed    INFECTIOUS DISEASE:  per ID.  Repeat Procal adn fungitel.  Repeat BC including fungal cultures.  Start Meropenem and Caspofungin     HEMATOLOGICAL:  AC    ENDOCRINE:  Follow up FS.  Insulin protocol if needed.    MUSCULOSKELETAL: bedrest    CEU  poor prognosis

## 2020-11-22 NOTE — PROGRESS NOTE ADULT - SUBJECTIVE AND OBJECTIVE BOX
Patient is a 75y old  Male who presents with a chief complaint of covid-19 (17 Nov 2020 13:09)    HPI:   76 yo male, pmh of AAA s/p repari, htn, hld, cad, presents to ed for sob and severe weakness, Pt was dx with covid-19  10 days ago, today felt more weak and sob, ems found the pt to be hypoxic (10 Nov 2020 13:21)    PAST MEDICAL & SURGICAL HISTORY:  Abdominal aortic aneurysm  Essential hypertension  High cholesterol  Myocardial infarction  S/P aneurysm repair    patient seen and examined independently on morning rounds, chart reviewed and discussed with the medicine resident and on interdisciplinary rounds.    not feeling well today- increasing work of breathing- no epistaxis               PE:  GEN-NAD, AAOx3  PULM-  fair air entry, decreased bs bilateral bases (anteriorly)  CVS- +s1/s2 RRR  GI- soft NT obese ND +bs, no rebound, no guarding, + suprapubic and lower abdominal ecchymosis   EXT-trace edema          Labs:                        15.6   31.36 )-----------( 183      ( 22 Nov 2020 08:23 )             47.3     CBC Full  -  ( 22 Nov 2020 08:23 )  WBC Count : 31.36 K/uL  RBC Count : 5.21 M/uL  Hemoglobin : 15.6 g/dL  Hematocrit : 47.3 %  Platelet Count - Automated : 183 K/uL  Mean Cell Volume : 90.8 fL  Mean Cell Hemoglobin : 29.9 pg  Mean Cell Hemoglobin Concentration : 33.0 g/dL  Auto Neutrophil # : 29.13 K/uL  Auto Lymphocyte # : 0.40 K/uL  Auto Monocyte # : 1.47 K/uL  Auto Eosinophil # : 0.01 K/uL  Auto Basophil # : 0.08 K/uL  Auto Neutrophil % : 92.8 %  Auto Lymphocyte % : 1.3 %  Auto Monocyte % : 4.7 %  Auto Eosinophil % : 0.0 %  Auto Basophil % : 0.3 %      11-22    135  |  99  |  72<HH>  ----------------------------<  136<H>  4.5   |  22  |  1.5    Ca    7.9<L>      22 Nov 2020 08:23  Mg     2.5     11-22    TPro  5.4<L>  /  Alb  3.0<L>  /  TBili  1.0  /  DBili  x   /  AST  20  /  ALT  32  /  AlkPhos  88  11-22      Microbiology:      Vital Signs Last 24 Hrs  T(C): 36.2 (22 Nov 2020 09:00), Max: 36.5 (21 Nov 2020 16:49)  T(F): 97.2 (22 Nov 2020 09:00), Max: 97.7 (21 Nov 2020 16:49)  HR: 109 (22 Nov 2020 09:00) (95 - 109)  BP: 115/68 (22 Nov 2020 09:00) (72/51 - 134/65)  BP(mean): --  RR: 18 (22 Nov 2020 09:00) (18 - 22)  SpO2: 95% (22 Nov 2020 06:14) (92% - 96%)    I&O's Summary    21 Nov 2020 07:01  -  22 Nov 2020 07:00  --------------------------------------------------------  IN: 360 mL / OUT: 0 mL / NET: 360 mL    22 Nov 2020 07:01  -  22 Nov 2020 11:48  --------------------------------------------------------  IN: 0 mL / OUT: 300 mL / NET: -300 mL

## 2020-11-22 NOTE — PHYSICAL THERAPY INITIAL EVALUATION ADULT - LEVEL OF INDEPENDENCE: SIT/STAND, REHAB EVAL
unable to perform/Pt unsafe to attempt at this time. Pt completed 1 set x 10 of each exercise and required pursed lip breathing to regain breath. Pt desat to 88% when doing each exercise.

## 2020-11-22 NOTE — PHYSICAL THERAPY INITIAL EVALUATION ADULT - PATIENT/FAMILY AGREES WITH PLAN
yes/Pt educated on therapeutic exercises (bicep curls, hip flex, ankle pumps, pursed lip breathing) and pt return demonstrated 1 x10 each side.

## 2020-11-22 NOTE — PROGRESS NOTE ADULT - SUBJECTIVE AND OBJECTIVE BOX
Patient is a 75y old  Male who presents with a chief complaint of covid-19 (21 Nov 2020 13:05)        Over Night Events:  Some shoulder pain.  SOB.  off pressors.  No diarrhea         ROS:     All pertinent ROS are negative except HPI         PHYSICAL EXAM    ICU Vital Signs Last 24 Hrs  T(C): 36.2 (22 Nov 2020 09:00), Max: 36.5 (21 Nov 2020 16:49)  T(F): 97.2 (22 Nov 2020 09:00), Max: 97.7 (21 Nov 2020 16:49)  HR: 109 (22 Nov 2020 09:00) (95 - 109)  BP: 115/68 (22 Nov 2020 09:00) (72/51 - 134/65)  BP(mean): --  ABP: --  ABP(mean): --  RR: 18 (22 Nov 2020 09:00) (18 - 22)  SpO2: 95% (22 Nov 2020 06:14) (92% - 96%)      CONSTITUTIONAL:   Well nourished.  In mild respiratory distress    ENT:   Airway patent,   Mouth with normal mucosa.   No thrush    EYES:   Pupils equal,   Round and reactive to light.    CARDIAC:   Normal rate,   Regular rhythm.    No edema      Vascular:  Normal systolic impulse  No Carotid bruits    RESPIRATORY:   No wheezing  Bilateral crackles   Normal chest expansion  Not tachypneic,  No use of accessory muscles    GASTROINTESTINAL:  Abdomen soft,   Non-tender,   No guarding,   + BS    GENITOURINARY  normal genitalia for sex  no edema    MUSCULOSKELETAL:   Range of motion is not limited,  No clubbing, cyanosis    NEUROLOGICAL:   Alert and oriented   No motor  deficits.  pertinent DTRs normal    SKIN:   Skin normal color for race,   Warm and dry  No evidence of rash.    PSYCHIATRIC:   Normal mood and affect.   No apparent risk to self or others.    HEMATOLOGICAL:  No cervical  lymphadenopathy.  no inguinal lymphadenopathy      11-21-20 @ 07:01  -  11-22-20 @ 07:00  --------------------------------------------------------  IN:    Oral Fluid: 360 mL  Total IN: 360 mL    OUT:  Total OUT: 0 mL    Total NET: 360 mL      11-22-20 @ 07:01  -  11-22-20 @ 09:54  --------------------------------------------------------  IN:  Total IN: 0 mL    OUT:    Voided (mL): 300 mL  Total OUT: 300 mL    Total NET: -300 mL          LABS:                            15.6   31.36 )-----------( 183      ( 22 Nov 2020 08:23 )             47.3                                               11-21    134<L>  |  98  |  58<H>  ----------------------------<  105<H>  4.7   |  23  |  1.3    Ca    8.6      21 Nov 2020 06:29  Mg     2.6     11-21    TPro  5.4<L>  /  Alb  3.0<L>  /  TBili  1.6<H>  /  DBili  x   /  AST  26  /  ALT  45<H>  /  AlkPhos  91  11-21      PT/INR - ( 21 Nov 2020 06:29 )   PT: 11.60 sec;   INR: 1.01 ratio         PTT - ( 20 Nov 2020 17:53 )  PTT:31.4 sec                                                                                     LIVER FUNCTIONS - ( 21 Nov 2020 06:29 )  Alb: 3.0 g/dL / Pro: 5.4 g/dL / ALK PHOS: 91 U/L / ALT: 45 U/L / AST: 26 U/L / GGT: x                                                                                                                                       MEDICATIONS  (STANDING):  aspirin enteric coated 81 milliGRAM(s) Oral daily  atorvastatin 80 milliGRAM(s) Oral at bedtime  BACItracin   Ointment 1 Application(s) Topical four times a day  enoxaparin Study Injectable () 1 Dose(s) SubCutaneous two times a day  furosemide   Injectable 40 milliGRAM(s) IV Push every 12 hours  influenza   Vaccine 0.5 milliLiter(s) IntraMuscular once  lidocaine   Patch 1 Patch Transdermal daily  lisinopril 20 milliGRAM(s) Oral daily  metoprolol tartrate 12.5 milliGRAM(s) Oral every 12 hours  pantoprazole    Tablet 40 milliGRAM(s) Oral before breakfast  QUEtiapine 50 milliGRAM(s) Oral at bedtime    MEDICATIONS  (PRN):  acetaminophen   Tablet .. 650 milliGRAM(s) Oral every 4 hours PRN Mild Pain (1 - 3)  oxycodone    5 mG/acetaminophen 325 mG 1 Tablet(s) Oral every 4 hours PRN Moderate Pain (4 - 6)  zolpidem 5 milliGRAM(s) Oral at bedtime PRN Insomnia      New X-rays reviewed:                                                                                  ECHO    CXR interpreted by me:  Bilateral infiltrates

## 2020-11-22 NOTE — PROGRESS NOTE ADULT - ASSESSMENT
a/p:  #acute hypoxic respiratory failure 2/2 SARS covid-19 pneumonia  -HD#12  -continue airbourne contact isolation precautions  -continue managment as per SDU/CEU   -s/p remdesivir x 5 days, toci x 2 doses  -remains on HFNC-with humidifier--decrease O2 suppl as tolerated  --intermittent bipap  -daily cxr and abg   -on lovenox investigational study---will need duplex prior to dc  -Incentive spirometry  -monitor esr/crp and inflamm markers q48 hr (procal 0.13 and ferritin 1176)  -wbc 33k--->31k today-repeat procal and fungatel  -ID follow up---start merrem and caspofungin  -monitor wbc and fever curve  -f/u bcx   -MRSA negative  -cont dexamethasone 6 mg daily    #epistaxis--from bipap/hfnc  -improving  -cont to monitor closely  -monitor for any recurrence  -coag and plt stable (266k)    DVT/GI ppx  FULL CODE    guarded prognosis--patient is high risk for cardiopulmonary complicated due to COVID 19 infection  called and spoke with family Bonny (daugther)---[phone]  updated on status and advised to facetime with father

## 2020-11-23 NOTE — PROGRESS NOTE ADULT - ASSESSMENT
IMPRESSION:    Acute hypoxic respiratory failure worsening status  Sepsis present on admission  COVID 19 pneumonia   HFMREF  Possible superimposed infection   Worsening renal function    SUGGEST:      CNS: Avoid CNS depressant.  Seroquel 50 mg     HEENT: Oral care    PULMONARY:  HOB @ 45 degrees.  HHFNC alternate with BIPAP, keep Sao2 92 to 96%, Incentive spirometry , might need intubation    CARDIOVASCULAR:  Avoid volume overload. hold lasix    GI: GI prophylaxis.  Feeding as tolerated     RENAL:  Follow up lytes.  Correct as needed    INFECTIOUS DISEASE:  per ID.  Repeat Procal adn fungitell.  Repeat BC including fungal cultures.  Start Meropenem and Caspofungin for now    HEMATOLOGICAL:  AC ? ON STUDY    ENDOCRINE:  Follow up FS.  Insulin protocol if needed.    MUSCULOSKELETAL: bedrest    CEU  poor prognosis

## 2020-11-23 NOTE — PROGRESS NOTE ADULT - SUBJECTIVE AND OBJECTIVE BOX
SUBJECTIVE:    Patient is a 75y old Male who presents with a chief complaint of covid-19 (22 Nov 2020 11:41)    Currently admitted to medicine with the primary diagnosis of: COVID    Today is hospital day 13d.     Overnight Events:     Patient respiratory function is worsening. desating on high flow and bipap. Patient is also complaining of back pain.     PAST MEDICAL & SURGICAL HISTORY  Abdominal aortic aneurysm    Essential hypertension    High cholesterol    Myocardial infarction    S/P aneurysm repair      ALLERGIES:  No Known Allergies    MEDICATIONS:  STANDING MEDICATIONS  aspirin enteric coated 81 milliGRAM(s) Oral daily  atorvastatin 80 milliGRAM(s) Oral at bedtime  BACItracin   Ointment 1 Application(s) Topical four times a day  caspofungin IVPB      caspofungin IVPB 50 milliGRAM(s) IV Intermittent every 24 hours  enoxaparin Study Injectable () 1 Dose(s) SubCutaneous two times a day  furosemide   Injectable 40 milliGRAM(s) IV Push every 12 hours  influenza   Vaccine 0.5 milliLiter(s) IntraMuscular once  lidocaine   Patch 1 Patch Transdermal daily  lisinopril 20 milliGRAM(s) Oral daily  meropenem  IVPB 1000 milliGRAM(s) IV Intermittent every 8 hours  metoprolol tartrate 12.5 milliGRAM(s) Oral every 12 hours  metoprolol tartrate Injectable 5 milliGRAM(s) IV Push every 8 hours  pantoprazole    Tablet 40 milliGRAM(s) Oral before breakfast  QUEtiapine 50 milliGRAM(s) Oral at bedtime  QUEtiapine 25 milliGRAM(s) Oral at bedtime    PRN MEDICATIONS  acetaminophen   Tablet .. 650 milliGRAM(s) Oral every 4 hours PRN  morphine  - Injectable 1 milliGRAM(s) IV Push every 4 hours PRN  oxycodone    5 mG/acetaminophen 325 mG 1 Tablet(s) Oral every 4 hours PRN  zolpidem 5 milliGRAM(s) Oral at bedtime PRN    VITALS:   ICU Vital Signs Last 24 Hrs  T(C): 36.2 (23 Nov 2020 08:01), Max: 36.3 (23 Nov 2020 04:00)  T(F): 97.1 (23 Nov 2020 08:01), Max: 97.4 (23 Nov 2020 04:00)  HR: 112 (23 Nov 2020 09:25) (71 - 115)  BP: 136/73 (23 Nov 2020 08:00) (99/63 - 136/73)  BP(mean): --  ABP: --  ABP(mean): --  RR: 24 (23 Nov 2020 08:00) (20 - 24)  SpO2: 89% (23 Nov 2020 09:25) (89% - 99%)      LABS:                        16.1   29.25 )-----------( 187      ( 23 Nov 2020 08:11 )             49.6     11-23    136  |  95<L>  |  80<HH>  ----------------------------<  166<H>  4.7   |  24  |  1.8<H>    Ca    8.1<L>      23 Nov 2020 08:11  Mg     2.7     11-23    TPro  5.4<L>  /  Alb  2.9<L>  /  TBili  1.0  /  DBili  x   /  AST  22  /  ALT  30  /  AlkPhos  94  11-23    PT/INR - ( 23 Nov 2020 08:11 )   PT: 13.00 sec;   INR: 1.13 ratio                               16.1   29.25 )-----------( 187      ( 23 Nov 2020 08:11 )             49.6 11-23    136  |  95  |  80  ----------------------------<  166  4.7   |  24  |  1.8  Ca    8.1      23 Nov 2020 08:11Mg     2.7     11-23  TPro  5.4  /  Alb  2.9  /  TBili  1.0  /  DBili  x   /  AST  22  /  ALT  30  /  AlkPhos  94  11-23    Procalcitonin, Serum: 0.13 (11-21)  Procalcitonin, Serum: 0.15 (11-10)  Procalcitonin, Serum: 0.17 (11-10)    C-Reactive Protein, Serum: 0.18 (11-19)  C-Reactive Protein, Serum: 0.39 (11-17)  C-Reactive Protein, Serum: 0.94 (11-15)  C-Reactive Protein, Serum: 3.14 (11-13)  C-Reactive Protein, Serum: 9.74 (11-11)  C-Reactive Protein, Serum: 15.97 (11-10)  C-Reactive Protein, Serum: 16.49 (11-10)    Ferritin, Serum: 1176 (11-21)  Ferritin, Serum: 1183 (11-17)  Ferritin, Serum: 1327 (11-15)  Ferritin, Serum: 1516 (11-13)  Ferritin, Serum: 2998 (11-10)  Ferritin, Serum: 3080 (11-10)      D-Dimer Assay, Quantitative: 779 (11-23)  D-Dimer Assay, Quantitative: 481 (11-22)  D-Dimer Assay, Quantitative: 417 (11-21)  D-Dimer Assay, Quantitative: 473 (11-17)  D-Dimer Assay, Quantitative: 698 (11-15)  D-Dimer Assay, Quantitative: 979 (11-13)  D-Dimer Assay, Quantitative: 3262 (11-10)  D-Dimer Assay, Quantitative: 2886 (11-10)      RADIOLOGY:      < from: Xray Chest 1 View- PORTABLE-Routine (Xray Chest 1 View- PORTABLE-Routine in AM.) (11.23.20 @ 08:40) >  Impression:    No significant interval change bilateral interstitial opacities.     No pneumothorax    < end of copied text >    PHYSICAL EXAM:  GEN:   LUNGS:   HEART:   ABD:   EXT:   NEURO:     Mobility (6 Click Score):      /24    Lines:  Central line:              Date placed:             Indication:   Intravenous Access:   NG tube:   De Catheter:          SUBJECTIVE:    Patient is a 75y old Male who presents with a chief complaint of covid-19 (22 Nov 2020 11:41)    Currently admitted to medicine with the primary diagnosis of: COVID    Today is hospital day 13d.     Overnight Events:     Patient respiratory function is worsening. desating on high flow and bipap. Patient is also complaining of back pain.     PAST MEDICAL & SURGICAL HISTORY  Abdominal aortic aneurysm    Essential hypertension    High cholesterol    Myocardial infarction    S/P aneurysm repair      ALLERGIES:  No Known Allergies    MEDICATIONS:  STANDING MEDICATIONS  aspirin enteric coated 81 milliGRAM(s) Oral daily  atorvastatin 80 milliGRAM(s) Oral at bedtime  BACItracin   Ointment 1 Application(s) Topical four times a day  caspofungin IVPB      caspofungin IVPB 50 milliGRAM(s) IV Intermittent every 24 hours  enoxaparin Study Injectable () 1 Dose(s) SubCutaneous two times a day  furosemide   Injectable 40 milliGRAM(s) IV Push every 12 hours  influenza   Vaccine 0.5 milliLiter(s) IntraMuscular once  lidocaine   Patch 1 Patch Transdermal daily  lisinopril 20 milliGRAM(s) Oral daily  meropenem  IVPB 1000 milliGRAM(s) IV Intermittent every 8 hours  metoprolol tartrate 12.5 milliGRAM(s) Oral every 12 hours  metoprolol tartrate Injectable 5 milliGRAM(s) IV Push every 8 hours  pantoprazole    Tablet 40 milliGRAM(s) Oral before breakfast  QUEtiapine 50 milliGRAM(s) Oral at bedtime  QUEtiapine 25 milliGRAM(s) Oral at bedtime    PRN MEDICATIONS  acetaminophen   Tablet .. 650 milliGRAM(s) Oral every 4 hours PRN  morphine  - Injectable 1 milliGRAM(s) IV Push every 4 hours PRN  oxycodone    5 mG/acetaminophen 325 mG 1 Tablet(s) Oral every 4 hours PRN  zolpidem 5 milliGRAM(s) Oral at bedtime PRN    VITALS:   ICU Vital Signs Last 24 Hrs  T(C): 36.2 (23 Nov 2020 08:01), Max: 36.3 (23 Nov 2020 04:00)  T(F): 97.1 (23 Nov 2020 08:01), Max: 97.4 (23 Nov 2020 04:00)  HR: 112 (23 Nov 2020 09:25) (71 - 115)  BP: 136/73 (23 Nov 2020 08:00) (99/63 - 136/73)  BP(mean): --  ABP: --  ABP(mean): --  RR: 24 (23 Nov 2020 08:00) (20 - 24)  SpO2: 89% (23 Nov 2020 09:25) (89% - 99%)      LABS:                        16.1   29.25 )-----------( 187      ( 23 Nov 2020 08:11 )             49.6     11-23    136  |  95<L>  |  80<HH>  ----------------------------<  166<H>  4.7   |  24  |  1.8<H>    Ca    8.1<L>      23 Nov 2020 08:11  Mg     2.7     11-23    TPro  5.4<L>  /  Alb  2.9<L>  /  TBili  1.0  /  DBili  x   /  AST  22  /  ALT  30  /  AlkPhos  94  11-23    PT/INR - ( 23 Nov 2020 08:11 )   PT: 13.00 sec;   INR: 1.13 ratio                               16.1   29.25 )-----------( 187      ( 23 Nov 2020 08:11 )             49.6 11-23    136  |  95  |  80  ----------------------------<  166  4.7   |  24  |  1.8  Ca    8.1      23 Nov 2020 08:11Mg     2.7     11-23  TPro  5.4  /  Alb  2.9  /  TBili  1.0  /  DBili  x   /  AST  22  /  ALT  30  /  AlkPhos  94  11-23    Procalcitonin, Serum: 0.13 (11-21)  Procalcitonin, Serum: 0.15 (11-10)  Procalcitonin, Serum: 0.17 (11-10)    C-Reactive Protein, Serum: 0.18 (11-19)  C-Reactive Protein, Serum: 0.39 (11-17)  C-Reactive Protein, Serum: 0.94 (11-15)  C-Reactive Protein, Serum: 3.14 (11-13)  C-Reactive Protein, Serum: 9.74 (11-11)  C-Reactive Protein, Serum: 15.97 (11-10)  C-Reactive Protein, Serum: 16.49 (11-10)    Ferritin, Serum: 1176 (11-21)  Ferritin, Serum: 1183 (11-17)  Ferritin, Serum: 1327 (11-15)  Ferritin, Serum: 1516 (11-13)  Ferritin, Serum: 2998 (11-10)  Ferritin, Serum: 3080 (11-10)      D-Dimer Assay, Quantitative: 779 (11-23)  D-Dimer Assay, Quantitative: 481 (11-22)  D-Dimer Assay, Quantitative: 417 (11-21)  D-Dimer Assay, Quantitative: 473 (11-17)  D-Dimer Assay, Quantitative: 698 (11-15)  D-Dimer Assay, Quantitative: 979 (11-13)  D-Dimer Assay, Quantitative: 3262 (11-10)  D-Dimer Assay, Quantitative: 2886 (11-10)      RADIOLOGY:      < from: Xray Chest 1 View- PORTABLE-Routine (Xray Chest 1 View- PORTABLE-Routine in AM.) (11.23.20 @ 08:40) >  Impression:    No significant interval change bilateral interstitial opacities.     No pneumothorax    < end of copied text >    PHYSICAL EXAM:    GENERAL: labored breathing  CHEST/LUNG: decreased BS  HEART: Regular rate and rhythm; No murmurs, rubs, or gallops  ABDOMEN: Bowel sounds present; Soft, Nontender, Nondistended.   EXTREMITIES: no edema  NERVOUS SYSTEM:  Alert & Oriented X3,            SUBJECTIVE:    Patient is a 75y old Male who presents with a chief complaint of covid-19 (22 Nov 2020 11:41)    Currently admitted to medicine with the primary diagnosis of: COVID    Today is hospital day 13d.     Overnight Events:     Patient respiratory function is worsening. desating needs bipap 100%. Patient is also complaining of back pain.     PAST MEDICAL & SURGICAL HISTORY  Abdominal aortic aneurysm    Essential hypertension    High cholesterol    Myocardial infarction    S/P aneurysm repair      ALLERGIES:  No Known Allergies    MEDICATIONS:  STANDING MEDICATIONS  aspirin enteric coated 81 milliGRAM(s) Oral daily  atorvastatin 80 milliGRAM(s) Oral at bedtime  BACItracin   Ointment 1 Application(s) Topical four times a day  caspofungin IVPB      caspofungin IVPB 50 milliGRAM(s) IV Intermittent every 24 hours  enoxaparin Study Injectable () 1 Dose(s) SubCutaneous two times a day  furosemide   Injectable 40 milliGRAM(s) IV Push every 12 hours  influenza   Vaccine 0.5 milliLiter(s) IntraMuscular once  lidocaine   Patch 1 Patch Transdermal daily  lisinopril 20 milliGRAM(s) Oral daily  meropenem  IVPB 1000 milliGRAM(s) IV Intermittent every 8 hours  metoprolol tartrate 12.5 milliGRAM(s) Oral every 12 hours  metoprolol tartrate Injectable 5 milliGRAM(s) IV Push every 8 hours  pantoprazole    Tablet 40 milliGRAM(s) Oral before breakfast  QUEtiapine 50 milliGRAM(s) Oral at bedtime  QUEtiapine 25 milliGRAM(s) Oral at bedtime    PRN MEDICATIONS  acetaminophen   Tablet .. 650 milliGRAM(s) Oral every 4 hours PRN  morphine  - Injectable 1 milliGRAM(s) IV Push every 4 hours PRN  oxycodone    5 mG/acetaminophen 325 mG 1 Tablet(s) Oral every 4 hours PRN  zolpidem 5 milliGRAM(s) Oral at bedtime PRN    VITALS:   ICU Vital Signs Last 24 Hrs  T(C): 36.2 (23 Nov 2020 08:01), Max: 36.3 (23 Nov 2020 04:00)  T(F): 97.1 (23 Nov 2020 08:01), Max: 97.4 (23 Nov 2020 04:00)  HR: 112 (23 Nov 2020 09:25) (71 - 115)  BP: 136/73 (23 Nov 2020 08:00) (99/63 - 136/73)  BP(mean): --  ABP: --  ABP(mean): --  RR: 24 (23 Nov 2020 08:00) (20 - 24)  SpO2: 89% (23 Nov 2020 09:25) (89% - 99%)      LABS:                        16.1   29.25 )-----------( 187      ( 23 Nov 2020 08:11 )             49.6     11-23    136  |  95<L>  |  80<HH>  ----------------------------<  166<H>  4.7   |  24  |  1.8<H>    Ca    8.1<L>      23 Nov 2020 08:11  Mg     2.7     11-23    TPro  5.4<L>  /  Alb  2.9<L>  /  TBili  1.0  /  DBili  x   /  AST  22  /  ALT  30  /  AlkPhos  94  11-23    PT/INR - ( 23 Nov 2020 08:11 )   PT: 13.00 sec;   INR: 1.13 ratio                               16.1   29.25 )-----------( 187      ( 23 Nov 2020 08:11 )             49.6 11-23    136  |  95  |  80  ----------------------------<  166  4.7   |  24  |  1.8  Ca    8.1      23 Nov 2020 08:11Mg     2.7     11-23  TPro  5.4  /  Alb  2.9  /  TBili  1.0  /  DBili  x   /  AST  22  /  ALT  30  /  AlkPhos  94  11-23    Procalcitonin, Serum: 0.13 (11-21)  Procalcitonin, Serum: 0.15 (11-10)  Procalcitonin, Serum: 0.17 (11-10)    C-Reactive Protein, Serum: 0.18 (11-19)  C-Reactive Protein, Serum: 0.39 (11-17)  C-Reactive Protein, Serum: 0.94 (11-15)  C-Reactive Protein, Serum: 3.14 (11-13)  C-Reactive Protein, Serum: 9.74 (11-11)  C-Reactive Protein, Serum: 15.97 (11-10)  C-Reactive Protein, Serum: 16.49 (11-10)    Ferritin, Serum: 1176 (11-21)  Ferritin, Serum: 1183 (11-17)  Ferritin, Serum: 1327 (11-15)  Ferritin, Serum: 1516 (11-13)  Ferritin, Serum: 2998 (11-10)  Ferritin, Serum: 3080 (11-10)      D-Dimer Assay, Quantitative: 779 (11-23)  D-Dimer Assay, Quantitative: 481 (11-22)  D-Dimer Assay, Quantitative: 417 (11-21)  D-Dimer Assay, Quantitative: 473 (11-17)  D-Dimer Assay, Quantitative: 698 (11-15)  D-Dimer Assay, Quantitative: 979 (11-13)  D-Dimer Assay, Quantitative: 3262 (11-10)  D-Dimer Assay, Quantitative: 2886 (11-10)      RADIOLOGY:      < from: Xray Chest 1 View- PORTABLE-Routine (Xray Chest 1 View- PORTABLE-Routine in AM.) (11.23.20 @ 08:40) >  Impression:    No significant interval change bilateral interstitial opacities.     No pneumothorax    < end of copied text >    PHYSICAL EXAM:    GENERAL: labored breathing  CHEST/LUNG: decreased BS  HEART: Regular rate and rhythm; No murmurs, rubs, or gallops  ABDOMEN: Bowel sounds present; Soft, Nontender, Nondistended.   EXTREMITIES: no edema  NERVOUS SYSTEM:  Alert & Oriented X3,

## 2020-11-23 NOTE — PROGRESS NOTE ADULT - SUBJECTIVE AND OBJECTIVE BOX
OVERNIGHT EVENTS: events noted, worsening status, increase oxygen requirement, on BIPAP 100%    Vital Signs Last 24 Hrs  T(C): 36.2 (23 Nov 2020 08:01), Max: 36.3 (23 Nov 2020 04:00)  T(F): 97.1 (23 Nov 2020 08:01), Max: 97.4 (23 Nov 2020 04:00)  HR: 112 (23 Nov 2020 09:25) (71 - 115)  BP: 136/73 (23 Nov 2020 08:00) (99/63 - 136/73)  RR: 24 (23 Nov 2020 08:00) (20 - 24)  SpO2: 89% (23 Nov 2020 09:25) (89% - 99%)    PHYSICAL EXAMINATION:    GENERAL: ill looking, tachypneic    HEENT: Head is normocephalic and atraumatic.     NECK: Supple.    LUNGS: bl rhonchi    HEART: TAMMIE 3/6    ABDOMEN: Soft, nontender, and nondistended.      EXTREMITIES: Without any cyanosis, clubbing, rash, lesions or edema.    NEUROLOGIC: Grossly intact.    SKIN: No ulceration or induration present.      LABS:                        16.1   29.25 )-----------( 187      ( 23 Nov 2020 08:11 )             49.6     11-23    136  |  95<L>  |  80<HH>  ----------------------------<  166<H>  4.7   |  24  |  1.8<H>    Ca    8.1<L>      23 Nov 2020 08:11  Mg     2.7     11-23    TPro  5.4<L>  /  Alb  2.9<L>  /  TBili  1.0  /  DBili  x   /  AST  22  /  ALT  30  /  AlkPhos  94  11-23    PT/INR - ( 23 Nov 2020 08:11 )   PT: 13.00 sec;   INR: 1.13 ratio                   D-Dimer Assay, Quantitative: 779 ng/mL DDU (11-23-20 @ 08:11)        Procalcitonin, Serum: 0.13 ng/mL (11-21-20 @ 10:54)        11-22-20 @ 07:01  -  11-23-20 @ 07:00  --------------------------------------------------------  IN: 0 mL / OUT: 600 mL / NET: -600 mL        MICROBIOLOGY:      MEDICATIONS  (STANDING):  aspirin enteric coated 81 milliGRAM(s) Oral daily  atorvastatin 80 milliGRAM(s) Oral at bedtime  BACItracin   Ointment 1 Application(s) Topical four times a day  caspofungin IVPB      caspofungin IVPB 50 milliGRAM(s) IV Intermittent every 24 hours  enoxaparin Study Injectable () 1 Dose(s) SubCutaneous two times a day  furosemide   Injectable 40 milliGRAM(s) IV Push every 12 hours  influenza   Vaccine 0.5 milliLiter(s) IntraMuscular once  lidocaine   Patch 1 Patch Transdermal daily  lisinopril 20 milliGRAM(s) Oral daily  meropenem  IVPB 1000 milliGRAM(s) IV Intermittent every 8 hours  metoprolol tartrate 12.5 milliGRAM(s) Oral every 12 hours  metoprolol tartrate Injectable 5 milliGRAM(s) IV Push every 8 hours  pantoprazole    Tablet 40 milliGRAM(s) Oral before breakfast  QUEtiapine 25 milliGRAM(s) Oral at bedtime  QUEtiapine 50 milliGRAM(s) Oral at bedtime    MEDICATIONS  (PRN):  acetaminophen   Tablet .. 650 milliGRAM(s) Oral every 4 hours PRN Mild Pain (1 - 3)  morphine  - Injectable 1 milliGRAM(s) IV Push every 4 hours PRN Severe Pain (7 - 10)  oxycodone    5 mG/acetaminophen 325 mG 1 Tablet(s) Oral every 4 hours PRN Moderate Pain (4 - 6)  zolpidem 5 milliGRAM(s) Oral at bedtime PRN Insomnia      RADIOLOGY & ADDITIONAL STUDIES:

## 2020-11-23 NOTE — PROGRESS NOTE ADULT - ASSESSMENT
74 yo male, pmh of AAA s/p repari, htn, hld, cad, presents to ed for sob and severe weakness, Pt was dx with covid-19  10 days ago, today felt more weak and sob, ems found the pt to be hypoxic    #acute hypoxic respiratory failure 2/2 SARS covid-19 pneumonia  - s/p remdesivir x 5 days, toci x 2 doses  - wbc 33k--->31k-->29.25  - MRSA negative  - continue on HFNC-with humidifier and intermittant Bipap. desatting   - cont dexamethasone 6 mg daily  - on lovenox investigational study. will need duplex prior to dc  -monitor esr/crp and inflamm markers q48 hr (procal 0.13 and ferritin 1176)    -ID follow up---start merrem and caspofungin  -monitor wbc and fever curve  -f/u bcx, newton  -daily cxr and abg     #epistaxis--from bipap/hfnc  -improving  -cont to monitor closely  -monitor for any recurrence  -coag and plt stable (266k)    DVT/GI ppx  FULL CODE    guarded prognosis--patient is high risk for cardiopulmonary complicated due to COVID 19 infection  called and spoke with family Bonny (daugther)---[phone]  updated on status and advised to facetime with father     74 yo male, pmh of AAA s/p repair, htn, hld, cad, presents to ed for sob and severe weakness, Pt was dx with covid-19  10 days ago, today felt more weak and sob, ems found the pt to be hypoxic    #acute hypoxic respiratory failure 2/2 SARS covid-19 pneumonia  - s/p remdesivir x 5 days, toci x 2 doses  - on HFNC-with humidifier and intermittant Bipap. desatting might need intubation  - wbc 33k--->31k-->29.25  - MRSA negative  - cont dexamethasone 6 mg daily  - on lovenox investigational study. will need duplex prior to dc  - monitor esr/crp and inflamm markers q48 hr (procal 0.13 and ferritin 1176)  - ID follow up---continue meropenem and caspofungin  - f/u bcx, newton cx, fungitel  - daily cxr and abg     #epistaxis--from bipap/hfnc  -improving  -cont to monitor closely  -monitor for any recurrence  -coag and plt stable (266k)    #  A fib rate uncontrolled-  - on lovenox in drug study trial  - continue metoprolol 5 mg iv q8h    # Michael  - creatinine up 1.8  - will need hydration   - not eating on bipap    # GI PPx - Protonix   # DVT PPx - - on lovenox investigational study. will need duplex prior to dc  # Activity -  (X) Increase as Tolerated    # Dispo -   Patient to be discharged when medically optimized.  # Code Status - (X) FULL       guarded prognosis--patient is high risk for cardiopulmonary complicated due to COVID 19 infection  called and spoke with family Bonny (daugther)---[phone]  updated on status and advised to facetime with father

## 2020-11-23 NOTE — RESEARCH COMMUNICATION NOTE - NS AS RESEARCH COMMUNICATION NOTE FT
Patient noted with progressively worsening kidney function; If Creatinine Clearance <20; will need to switch to "unblinded" unfractionated heparin adjusted to renal dose.   We'll be following up closely.      If you have any Qs you can call Sophie (Research Coordinator) on [phone]. You can also Dr Sams ([phone]); or via Teams.

## 2020-11-23 NOTE — PROGRESS NOTE ADULT - ATTENDING COMMENTS
Patient seen and examined independently. Agree with resident note.  # Covid PNA now day 13-- was placed on Bipap and saturation hovering around 93% spoke with daughter and given an update-- patient may require intubation  daughter cried but did not make a decision and patient also could not make up his mind-- so currently full code.  s/p remdesvir and toci,  dexa continued  started on prophylaxis with bactrim and ID started caspofungin and meropenem  #  A fib rate uncontrolled-- on lovenox in drug study trial  continue metoprolol 5 mg iv q8h  # Michael-- will need hydration --not eating on bipap  # pain in neck-- got morphine today

## 2020-11-24 NOTE — PROGRESS NOTE ADULT - ASSESSMENT
IMPRESSION:    Acute hypoxic respiratory failure worsening status  Sepsis present on admission  COVID 19 pneumonia   HFMREF  Possible superimposed infection   Worsening renal function  Blood cx reviewed staph aureus bacteremia    SUGGEST:      CNS: Avoid CNS depressant.  Seroquel 50 mg     HEENT: Oral care    PULMONARY:  HOB @ 45 degrees.  HHFNC alternate with BIPAP, keep Sao2 92 to 96%, Incentive spirometry    CARDIOVASCULAR:  Avoid volume overload. hold lasix, echo rp endocarditis, hold lisinopril    GI: GI prophylaxis.  Feeding as tolerated     RENAL:  Follow up lytes.  Correct as needed, renal eval, repeat CMP    INFECTIOUS DISEASE:  per ID.  vanco if fungitell neg dc caspo    HEMATOLOGICAL:  AC ? ON STUDY    ENDOCRINE:  Follow up FS.  Insulin protocol if needed.    MUSCULOSKELETAL: bedrest CT Neck, spine    CEU  poor prognosis

## 2020-11-24 NOTE — PROGRESS NOTE ADULT - SUBJECTIVE AND OBJECTIVE BOX
OVERNIGHT EVENTS: events noted, still on BIPAP, looks better than yesterday co neck pain    Vital Signs Last 24 Hrs  T(C): 36.4 (24 Nov 2020 09:50), Max: 36.5 (23 Nov 2020 20:00)  T(F): 97.6 (24 Nov 2020 09:50), Max: 97.7 (23 Nov 2020 20:00)  HR: 114 (24 Nov 2020 09:50) (79 - 114)  BP: 103/69 (24 Nov 2020 09:50) (103/69 - 125/80)  RR: 20 (24 Nov 2020 09:50) (20 - 22)  SpO2: 99% (24 Nov 2020 09:50) (92% - 99%)    PHYSICAL EXAMINATION:    GENERAL: ILL LOOKING    HEENT: Head is normocephalic and atraumatic.    NECK: Supple.    LUNGS: bl rhocnhi    HEART: parisa 3/6    ABDOMEN: Soft, nontender, and nondistended.      EXTREMITIES: non focal    NEUROLOGIC: Grossly intact.    SKIN: No ulceration or induration present.      LABS:                        15.0   24.82 )-----------( 130      ( 24 Nov 2020 06:23 )             46.1     11-24    138  |  99  |  103<HH>  ----------------------------<  114<H>  5.4<H>   |  23  |  1.8<H>    Ca    8.2<L>      24 Nov 2020 06:23  Mg     2.7     11-23    TPro  5.2<L>  /  Alb  2.7<L>  /  TBili  0.8  /  DBili  x   /  AST  23  /  ALT  26  /  AlkPhos  84  11-24    PT/INR - ( 23 Nov 2020 08:11 )   PT: 13.00 sec;   INR: 1.13 ratio                   D-Dimer Assay, Quantitative: 544 ng/mL DDU (11-24-20 @ 06:23)        Procalcitonin, Serum: 0.84 ng/mL (11-23-20 @ 08:11)  Procalcitonin, Serum: 0.48 ng/mL (11-22-20 @ 20:50)  Procalcitonin, Serum: 0.13 ng/mL (11-21-20 @ 10:54)        11-24-20 @ 07:01  -  11-24-20 @ 10:24  --------------------------------------------------------  IN: 0 mL / OUT: 350 mL / NET: -350 mL        MICROBIOLOGY:  Culture Results:   Growth in anaerobic bottle: Gram Positive Cocci in Clusters  Growth in aerobic bottle: Gram Positive Cocci in Clusters  "Due to technical problems, Proteus sp. will Not be reported as part of  the BCID panel until further notice"  ***Blood Panel PCR results on this specimen are available  approximately 3 hours after the Gram stain result.***  Gram stain, PCR, and/or culture results may not always  correspond due to difference in methodologies.  ************************************************************  This PCR assay was performed using [employer].  The following targets are tested for: Enterococcus,  vancomycin resistant enterococci, Listeria monocytogenes,  coagulase negative staphylococci, S. aureus,  methicillin resistant S. aureus, Streptococcus agalactiae  (Group B), S. pneumoniae, S. pyogenes (Group A),  Acinetobacter baumannii, Enterobacter cloacae, E. coli,  Klebsiella oxytoca, K. pneumoniae, Proteus sp.,  Serratia marcescens, Haemophilus influenzae,  Neisseria meningitidis, Pseudomonas aeruginosa, Candida  albicans, C. glabrata, C krusei, C parapsilosis,  C. tropicalis and the KPC resistance gene. (11-22 @ 20:50)      MEDICATIONS  (STANDING):  aspirin enteric coated 81 milliGRAM(s) Oral daily  atorvastatin 80 milliGRAM(s) Oral at bedtime  BACItracin   Ointment 1 Application(s) Topical four times a day  caspofungin IVPB      caspofungin IVPB 50 milliGRAM(s) IV Intermittent every 24 hours  dexAMETHasone  Injectable 6 milliGRAM(s) IV Push daily  enoxaparin Study Injectable () 1 Dose(s) SubCutaneous two times a day  influenza   Vaccine 0.5 milliLiter(s) IntraMuscular once  lidocaine   Patch 1 Patch Transdermal daily  lisinopril 20 milliGRAM(s) Oral daily  meropenem  IVPB 1000 milliGRAM(s) IV Intermittent every 8 hours  metoprolol tartrate Injectable 5 milliGRAM(s) IV Push every 8 hours  pantoprazole    Tablet 40 milliGRAM(s) Oral before breakfast  QUEtiapine 50 milliGRAM(s) Oral at bedtime  QUEtiapine 25 milliGRAM(s) Oral at bedtime  sodium chloride 0.9%. 1000 milliLiter(s) (60 mL/Hr) IV Continuous <Continuous>    MEDICATIONS  (PRN):  acetaminophen   Tablet .. 650 milliGRAM(s) Oral every 4 hours PRN Mild Pain (1 - 3)  morphine  - Injectable 1 milliGRAM(s) IV Push every 4 hours PRN Severe Pain (7 - 10)  oxycodone    5 mG/acetaminophen 325 mG 1 Tablet(s) Oral every 4 hours PRN Moderate Pain (4 - 6)  zolpidem 5 milliGRAM(s) Oral at bedtime PRN Insomnia      RADIOLOGY & ADDITIONAL STUDIES:

## 2020-11-24 NOTE — RESEARCH COMMUNICATION NOTE - NS AS RESEARCH COMMUNICATION NOTE FT
Patient's kidney function noted; will continue to be on Low-Molecular Weight Heparin (Lovenox) for now and we'll be following up closely.   Will need to switched to SQ Heparin instead 5000 Q12 if eGFR (CrCl) <20 (currently ~40).     If you have any Qs you can call Sophie (Research Coordinator) on [phone] or [phone]. You can also Dr Sams ([phone] , [phone], or Teams).

## 2020-11-24 NOTE — PROGRESS NOTE ADULT - SUBJECTIVE AND OBJECTIVE BOX
SUBJECTIVE:    Patient is a 75y old Male who presents with a chief complaint of covid-19 (24 Nov 2020 10:24)    Currently admitted to medicine with the primary diagnosis of: COVID    Today is hospital day 14d.     Overnight Events:     Patient has episodes of VTACH overnight. Patient is in active Afib. no hx of afib. still endorses pain of his right shoulder.     PAST MEDICAL & SURGICAL HISTORY  Abdominal aortic aneurysm    Essential hypertension    High cholesterol    Myocardial infarction    S/P aneurysm repair    ALLERGIES:  No Known Allergies    MEDICATIONS:  STANDING MEDICATIONS  aspirin enteric coated 81 milliGRAM(s) Oral daily  atorvastatin 80 milliGRAM(s) Oral at bedtime  BACItracin   Ointment 1 Application(s) Topical four times a day  dexAMETHasone  Injectable 6 milliGRAM(s) IV Push daily  enoxaparin Study Injectable () 1 Dose(s) SubCutaneous two times a day  influenza   Vaccine 0.5 milliLiter(s) IntraMuscular once  lidocaine   Patch 1 Patch Transdermal daily  meropenem  IVPB 1000 milliGRAM(s) IV Intermittent every 8 hours  metoprolol tartrate Injectable 5 milliGRAM(s) IV Push every 8 hours  nafcillin  IVPB 2 Gram(s) IV Intermittent every 4 hours  pantoprazole    Tablet 40 milliGRAM(s) Oral before breakfast  QUEtiapine 50 milliGRAM(s) Oral at bedtime  QUEtiapine 25 milliGRAM(s) Oral at bedtime  sodium chloride 0.9%. 1000 milliLiter(s) IV Continuous <Continuous>    PRN MEDICATIONS  acetaminophen   Tablet .. 650 milliGRAM(s) Oral every 4 hours PRN  morphine  - Injectable 1 milliGRAM(s) IV Push every 4 hours PRN  oxycodone    5 mG/acetaminophen 325 mG 1 Tablet(s) Oral every 4 hours PRN  zolpidem 5 milliGRAM(s) Oral at bedtime PRN    VITALS:   ICU Vital Signs Last 24 Hrs  T(C): 36.4 (24 Nov 2020 09:50), Max: 36.5 (23 Nov 2020 20:00)  T(F): 97.6 (24 Nov 2020 09:50), Max: 97.7 (23 Nov 2020 20:00)  HR: 114 (24 Nov 2020 09:50) (79 - 114)  BP: 103/69 (24 Nov 2020 09:50) (103/69 - 125/80)  BP(mean): --  ABP: --  ABP(mean): --  RR: 20 (24 Nov 2020 09:50) (20 - 20)  SpO2: 99% (24 Nov 2020 09:50) (92% - 99%)      LABS:                        15.0   24.82 )-----------( 130      ( 24 Nov 2020 06:23 )             46.1     11-24    138  |  99  |  103<HH>  ----------------------------<  114<H>  5.4<H>   |  23  |  1.8<H>    Ca    8.2<L>      24 Nov 2020 06:23  Mg     2.7     11-23    TPro  5.2<L>  /  Alb  2.7<L>  /  TBili  0.8  /  DBili  x   /  AST  23  /  ALT  26  /  AlkPhos  84  11-24    PT/INR - ( 23 Nov 2020 08:11 )   PT: 13.00 sec;   INR: 1.13 ratio      Procalcitonin, Serum: 0.84 (11-23)  Procalcitonin, Serum: 0.48 (11-22)  Procalcitonin, Serum: 0.13 (11-21)  Procalcitonin, Serum: 0.15 (11-10)    C-Reactive Protein, Serum: 7.37 (11-23)  C-Reactive Protein, Serum: 0.18 (11-19)  C-Reactive Protein, Serum: 0.39 (11-17)  C-Reactive Protein, Serum: 0.94 (11-15)  C-Reactive Protein, Serum: 3.14 (11-13)  C-Reactive Protein, Serum: 9.74 (11-11)  C-Reactive Protein, Serum: 15.97 (11-10)    Ferritin, Serum: 1847 (11-23)  Ferritin, Serum: 1176 (11-21)  Ferritin, Serum: 1183 (11-17)  Ferritin, Serum: 1327 (11-15)  Ferritin, Serum: 1516 (11-13)  Ferritin, Serum: 2998 (11-10)      D-Dimer Assay, Quantitative: 544 (11-24)  D-Dimer Assay, Quantitative: 779 (11-23)  D-Dimer Assay, Quantitative: 481 (11-22)  D-Dimer Assay, Quantitative: 417 (11-21)  D-Dimer Assay, Quantitative: 473 (11-17)  D-Dimer Assay, Quantitative: 698 (11-15)  D-Dimer Assay, Quantitative: 979 (11-13)  D-Dimer Assay, Quantitative: 3262 (11-10)              Culture - Blood (collected 22 Nov 2020 20:50)  Source: .Blood None  Gram Stain (23 Nov 2020 20:33):    Growth in aerobic bottle: Gram Positive Cocci in Clusters    Growth in anaerobic bottle: Gram Positive Cocci in Clusters  Preliminary Report (23 Nov 2020 20:33):    Growth in anaerobic bottle: Gram Positive Cocci in Clusters    Growth in aerobic bottle: Gram Positive Cocci in Clusters    "Due to technical problems, Proteus sp. will Not be reported as part of    the BCID panel until further notice"    ***Blood Panel PCR results on this specimen are available    approximately 3 hours after the Gram stain result.***    Gram stain, PCR, and/or culture results may not always    correspond due to difference in methodologies.    ************************************************************    This PCR assay was performed using [employer].    The following targets are tested for: Enterococcus,    vancomycin resistant enterococci, Listeria monocytogenes,    coagulase negative staphylococci, S. aureus,    methicillin resistant S. aureus, Streptococcus agalactiae    (Group B), S. pneumoniae, S. pyogenes (Group A),    Acinetobacter baumannii, Enterobacter cloacae, E. coli,    Klebsiella oxytoca, K. pneumoniae, Proteus sp.,    Serratia marcescens, Haemophilus influenzae,    Neisseria meningitidis, Pseudomonas aeruginosa, Candida    albicans, C. glabrata, C krusei, C parapsilosis,    C. tropicalis and the KPC resistance gene.  Organism: Blood Culture PCR (23 Nov 2020 21:07)  Organism: Blood Culture PCR (23 Nov 2020 21:07)      RADIOLOGY:    < from: Xray Chest 1 View- PORTABLE-Routine (Xray Chest 1 View- PORTABLE-Routine in AM.) (11.23.20 @ 08:40) >  Impression:    No significant interval change bilateral interstitial opacities.     No pneumothorax    < end of copied text >      PHYSICAL EXAM:    GENERAL: improving   CHEST/LUNG: coarse bs  HEART: IRIR; No murmurs, rubs, or gallops  ABDOMEN: Bowel sounds present; Soft, Nontender, Nondistended.   EXTREMITIES: no edema  NERVOUS SYSTEM:  Alert & Oriented X3,

## 2020-11-24 NOTE — PROGRESS NOTE ADULT - SUBJECTIVE AND OBJECTIVE BOX
SUBJECTIVE:    Patient is a 75y old Male who presents with a chief complaint of covid-19 (24 Nov 2020 10:24)    Currently admitted to medicine with the primary diagnosis of COVID-19       Today is hospital day 14d.     PAST MEDICAL & SURGICAL HISTORY  Abdominal aortic aneurysm    Essential hypertension    High cholesterol    Myocardial infarction    S/P aneurysm repair      ALLERGIES:  No Known Allergies    MEDICATIONS:  STANDING MEDICATIONS  aspirin enteric coated 81 milliGRAM(s) Oral daily  atorvastatin 80 milliGRAM(s) Oral at bedtime  BACItracin   Ointment 1 Application(s) Topical four times a day  dexAMETHasone  Injectable 6 milliGRAM(s) IV Push daily  enoxaparin Study Injectable () 1 Dose(s) SubCutaneous two times a day  influenza   Vaccine 0.5 milliLiter(s) IntraMuscular once  lidocaine   Patch 1 Patch Transdermal daily  meropenem  IVPB 1000 milliGRAM(s) IV Intermittent every 8 hours  metoprolol tartrate Injectable 5 milliGRAM(s) IV Push every 8 hours  nafcillin  IVPB 2 Gram(s) IV Intermittent every 4 hours  pantoprazole    Tablet 40 milliGRAM(s) Oral before breakfast  QUEtiapine 50 milliGRAM(s) Oral at bedtime  QUEtiapine 25 milliGRAM(s) Oral at bedtime  sodium chloride 0.9%. 1000 milliLiter(s) IV Continuous <Continuous>    PRN MEDICATIONS  acetaminophen   Tablet .. 650 milliGRAM(s) Oral every 4 hours PRN  morphine  - Injectable 1 milliGRAM(s) IV Push every 4 hours PRN  oxycodone    5 mG/acetaminophen 325 mG 1 Tablet(s) Oral every 4 hours PRN  zolpidem 5 milliGRAM(s) Oral at bedtime PRN    VITALS:   T(F): 97.6  HR: 114  BP: 103/69  RR: 20  SpO2: 99%    LABS:                        15.0   24.82 )-----------( 130      ( 24 Nov 2020 06:23 )             46.1     11-24    138  |  99  |  103<HH>  ----------------------------<  114<H>  5.4<H>   |  23  |  1.8<H>    Ca    8.2<L>      24 Nov 2020 06:23  Mg     2.7     11-23    TPro  5.2<L>  /  Alb  2.7<L>  /  TBili  0.8  /  DBili  x   /  AST  23  /  ALT  26  /  AlkPhos  84  11-24    PT/INR - ( 23 Nov 2020 08:11 )   PT: 13.00 sec;   INR: 1.13 ratio                   Culture - Blood (collected 22 Nov 2020 20:50)  Source: .Blood None  Gram Stain (23 Nov 2020 20:33):    Growth in aerobic bottle: Gram Positive Cocci in Clusters    Growth in anaerobic bottle: Gram Positive Cocci in Clusters  Preliminary Report (23 Nov 2020 20:33):    Growth in anaerobic bottle: Gram Positive Cocci in Clusters    Growth in aerobic bottle: Gram Positive Cocci in Clusters    "Due to technical problems, Proteus sp. will Not be reported as part of    the BCID panel until further notice"    ***Blood Panel PCR results on this specimen are available    approximately 3 hours after the Gram stain result.***    Gram stain, PCR, and/or culture results may not always    correspond due to difference in methodologies.    ************************************************************    This PCR assay was performed using [employer].    The following targets are tested for: Enterococcus,    vancomycin resistant enterococci, Listeria monocytogenes,    coagulase negative staphylococci, S. aureus,    methicillin resistant S. aureus, Streptococcus agalactiae    (Group B), S. pneumoniae, S. pyogenes (Group A),    Acinetobacter baumannii, Enterobacter cloacae, E. coli,    Klebsiella oxytoca, K. pneumoniae, Proteus sp.,    Serratia marcescens, Haemophilus influenzae,    Neisseria meningitidis, Pseudomonas aeruginosa, Candida    albicans, C. glabrata, C krusei, C parapsilosis,    C. tropicalis and the KPC resistance gene.  Organism: Blood Culture PCR (23 Nov 2020 21:07)  Organism: Blood Culture PCR (23 Nov 2020 21:07)          RADIOLOGY:    PHYSICAL EXAM:  GEN: No acute distress  LUNGS: Clear to auscultation bilaterally   HEART: S1/S2 present. RRR.   ABD/ GI: Soft, non-tender, non-distended. Bowel sounds present  EXT: No edema  NEURO: AAOX3

## 2020-11-24 NOTE — PROGRESS NOTE ADULT - ASSESSMENT
74 yo male, pmh of AAA s/p repair, htn, hld, cad, presents to ed for sob and severe weakness, Pt was dx with covid-19  10 days ago, today felt more weak and sob, ems found the pt to be hypoxic    #acute hypoxic respiratory failure 2/2 SARS covid-19 pneumonia  #Bacteremia- blood culture grew staph aureus 11/22  - s/p remdesivir x 5 days, toci x 2 doses  - on HFNC-with humidifier and intermittant Bipap.   - CXR- stable  - wbc 33k--->31k-->29.25->24.82  - crp- 7.37 11/23  - ferritin - 1847 11/23  - procal -.84 11/23  - d-dimer -544 11/24  - started on nafcillin 2g q4 (start 11/24)  - cont dexamethasone 6 mg daily  - on lovenox investigational study- patient worsening kidney function is being monitored  - FU ID recs - continue meropenem and caspofungin. can dc caspofungin if fungitel is negative. need recs for bacteremia.   - FU fungitel  - FU echo- r/o endocarditis  - FU xray cervical- see if there are any underlying inflammatory indicative of OM. patient is not stable enough for advance imaging.   - daily cxr and abg   - monitor esr/crp and inflamm markers q48 hr    #  A fib rate uncontrolled- new onset  - on lovenox in drug study trial- will contact to see if still remains a candidate.   - continue metoprolol 5 mg iv q8h  - given IV metoprolol 5 mg push.   - fu ecg    # SVITLANA  - creatinine up 1.8- not improving  - NS 60 cc/hr  - held lisinopril  - nephro consulted    # GI PPx - Protonix   # DVT PPx - - on lovenox investigational study. will need duplex prior to dc  # Activity -  Increase as Tolerated    # Dispo -   Patient to be discharged when medically optimized.  # Code Status - FULL       guarded prognosis--patient is high risk for cardiopulmonary complicated due to COVID 19 infection  family Bonny (daugther) ---[phone]  updated on status-patient may require intubation. did not make a decision and patient also could not make up his mind-- so currently full code.

## 2020-11-24 NOTE — PROGRESS NOTE ADULT - ASSESSMENT
# Covid PNA now day 13-- was placed on Bipap and saturation hovering around 93%  patient is on high flow currently  s/p remdesvir and toci,  dexa continued  ferritin is very high--1176-- 1847-- can give one more dose of toci.  # Bacteremia-- staph aureus-- started on nafcillin-- can change to ancef as patient is hyperkalemic  spoke with ID will DC caspofungin and meropenem and will just get X ray of neck-- cannot get ct scan with contrast due to MICHAEL  #  A fib rate uncontrolled-- on lovenox in drug study trial  continue metoprolol 5 mg iv q8h  # Michael--sec to COVid nephropathy vs sepsis-- urine lytes-- nephrology eval and Dc fluids--  # Hyperkalemic--will dc  lisinopril  # Pain in neck-- got morphine-- will get x ray c spine--  could be has diskitis--Bacteremic currently.

## 2020-11-25 NOTE — PROGRESS NOTE ADULT - SUBJECTIVE AND OBJECTIVE BOX
SUBJECTIVE:    Patient is a 75y old Male who presents with a chief complaint of covid-19 (24 Nov 2020 12:12)    Currently admitted to medicine with the primary diagnosis of: COVID    Today is hospital day 15d.     Overnight Events:     No acute overnight events. no changes in oxygen demand    PAST MEDICAL & SURGICAL HISTORY  Abdominal aortic aneurysm    Essential hypertension    High cholesterol    Myocardial infarction    S/P aneurysm repair    ALLERGIES:  No Known Allergies    MEDICATIONS:  STANDING MEDICATIONS  aspirin enteric coated 81 milliGRAM(s) Oral daily  atorvastatin 80 milliGRAM(s) Oral at bedtime  BACItracin   Ointment 1 Application(s) Topical four times a day  ceFAZolin   IVPB 2000 milliGRAM(s) IV Intermittent every 8 hours  dexAMETHasone  Injectable 6 milliGRAM(s) IV Push daily  heparin  Infusion 1000 Unit(s)/Hr IV Continuous <Continuous>  influenza   Vaccine 0.5 milliLiter(s) IntraMuscular once  lidocaine   Patch 1 Patch Transdermal daily  metoprolol tartrate Injectable 5 milliGRAM(s) IV Push every 6 hours  pantoprazole    Tablet 40 milliGRAM(s) Oral before breakfast  QUEtiapine 50 milliGRAM(s) Oral at bedtime  QUEtiapine 25 milliGRAM(s) Oral at bedtime    PRN MEDICATIONS  acetaminophen   Tablet .. 650 milliGRAM(s) Oral every 4 hours PRN  morphine  - Injectable 1 milliGRAM(s) IV Push every 4 hours PRN  oxycodone    5 mG/acetaminophen 325 mG 1 Tablet(s) Oral every 4 hours PRN  zolpidem 5 milliGRAM(s) Oral at bedtime PRN    VITALS:   ICU Vital Signs Last 24 Hrs  T(C): 35.9 (25 Nov 2020 00:00), Max: 37 (24 Nov 2020 16:00)  T(F): 96.7 (25 Nov 2020 00:00), Max: 98.6 (24 Nov 2020 16:00)  HR: 102 (25 Nov 2020 05:00) (92 - 125)  BP: 110/66 (25 Nov 2020 05:00) (89/62 - 113/72)  BP(mean): 69 (24 Nov 2020 12:00) (69 - 69)  ABP: --  ABP(mean): --  RR: 20 (25 Nov 2020 00:00) (20 - 20)  SpO2: 95% (25 Nov 2020 05:43) (92% - 99%)      LABS:                        13.5   30.23 )-----------( 139      ( 25 Nov 2020 05:27 )             41.2     11-25    137  |  101  |  106<HH>  ----------------------------<  125<H>  4.6   |  20  |  1.8<H>    Ca    8.0<L>      25 Nov 2020 05:27    TPro  5.1<L>  /  Alb  2.7<L>  /  TBili  0.5  /  DBili  x   /  AST  22  /  ALT  17  /  AlkPhos  83  11-25    PTT - ( 25 Nov 2020 05:27 )  PTT:128.4 sec      Troponin T, Serum: <0.01 ng/mL (11-24-20 @ 20:00)  Troponin T, Serum: <0.01 ng/mL (11-24-20 @ 13:57)      Culture - Blood (collected 22 Nov 2020 20:50)  Source: .Blood None  Gram Stain (23 Nov 2020 20:33):    Growth in aerobic bottle: Gram Positive Cocci in Clusters    Growth in anaerobic bottle: Gram Positive Cocci in Clusters  Preliminary Report (24 Nov 2020 17:10):    Growth in aerobic and anaerobic bottles: Staphylococcus aureus    "Due to technical problems, Proteus sp. will Not be reported as part of    the BCID panel until further notice"    ***Blood Panel PCR results on this specimen are available    approximately 3 hours after the Gram stain result.***    Gram stain, PCR, and/or culture results may not always    correspond due to difference in methodologies.    ************************************************************    This PCR assay was performed using [employer].    The following targets are tested for: Enterococcus,    vancomycin resistant enterococci, Listeria monocytogenes,    coagulase negative staphylococci, S. aureus,    methicillin resistant S. aureus, Streptococcus agalactiae    (Group B), S. pneumoniae, S. pyogenes (Group A),    Acinetobacter baumannii, Enterobacter cloacae, E. coli,    Klebsiella oxytoca, K. pneumoniae, Proteus sp.,    Serratia marcescens, Haemophilus influenzae,    Neisseria meningitidis, Pseudomonas aeruginosa, Candida    albicans, C. glabrata, C krusei, C parapsilosis,    C. tropicalis and the KPC resistance gene.  Organism: Blood Culture PCR (23 Nov 2020 21:07)  Organism: Blood Culture PCR (23 Nov 2020 21:07)      CARDIAC MARKERS ( 24 Nov 2020 20:00 )  x     / <0.01 ng/mL / x     / x     / x      CARDIAC MARKERS ( 24 Nov 2020 13:57 )  x     / <0.01 ng/mL / x     / x     / x                   Procalcitonin, Serum: 0.84 (11-23)  Procalcitonin, Serum: 0.48 (11-22)  Procalcitonin, Serum: 0.13 (11-21)    C-Reactive Protein, Serum: 10.50 (11-24)  C-Reactive Protein, Serum: 7.37 (11-23)  C-Reactive Protein, Serum: 0.18 (11-19)  C-Reactive Protein, Serum: 0.39 (11-17)  C-Reactive Protein, Serum: 0.94 (11-15)  C-Reactive Protein, Serum: 3.14 (11-13)  C-Reactive Protein, Serum: 9.74 (11-11)    Ferritin, Serum: 1847 (11-23)  Ferritin, Serum: 1176 (11-21)  Ferritin, Serum: 1183 (11-17)  Ferritin, Serum: 1327 (11-15)  Ferritin, Serum: 1516 (11-13)      D-Dimer Assay, Quantitative: 589 (11-25)  D-Dimer Assay, Quantitative: 544 (11-24)  D-Dimer Assay, Quantitative: 779 (11-23)  D-Dimer Assay, Quantitative: 481 (11-22)  D-Dimer Assay, Quantitative: 417 (11-21)  D-Dimer Assay, Quantitative: 473 (11-17)  D-Dimer Assay, Quantitative: 698 (11-15)  D-Dimer Assay, Quantitative: 979 (11-13)      RADIOLOGY:      < from: Xray Chest 1 View- PORTABLE-Routine (Xray Chest 1 View- PORTABLE-Routine in AM.) (11.23.20 @ 08:40) >  Impression:    No significant interval change bilateral interstitial opacities.     No pneumothorax    < end of copied text >    PHYSICAL EXAM:    GENERAL: NAD  CHEST/LUNG: coarse bs  HEART: IRIR; No murmurs, rubs, or gallops  ABDOMEN: Bowel sounds present; Soft, Nontender, Nondistended.   EXTREMITIES: no edema  NERVOUS SYSTEM:  Alert & Oriented X3,            SUBJECTIVE:    Patient is a 75y old Male who presents with a chief complaint of covid-19 (24 Nov 2020 12:12)    Currently admitted to medicine with the primary diagnosis of: COVID    Today is hospital day 15d.     Overnight Events:     after rounds patient was hypothermic, tachycardic , low BP and not handling HFNC and very well.     PAST MEDICAL & SURGICAL HISTORY  Abdominal aortic aneurysm    Essential hypertension    High cholesterol    Myocardial infarction    S/P aneurysm repair    ALLERGIES:  No Known Allergies    MEDICATIONS:  STANDING MEDICATIONS  aspirin enteric coated 81 milliGRAM(s) Oral daily  atorvastatin 80 milliGRAM(s) Oral at bedtime  BACItracin   Ointment 1 Application(s) Topical four times a day  ceFAZolin   IVPB 2000 milliGRAM(s) IV Intermittent every 8 hours  dexAMETHasone  Injectable 6 milliGRAM(s) IV Push daily  heparin  Infusion 1000 Unit(s)/Hr IV Continuous <Continuous>  influenza   Vaccine 0.5 milliLiter(s) IntraMuscular once  lidocaine   Patch 1 Patch Transdermal daily  metoprolol tartrate Injectable 5 milliGRAM(s) IV Push every 6 hours  pantoprazole    Tablet 40 milliGRAM(s) Oral before breakfast  QUEtiapine 50 milliGRAM(s) Oral at bedtime  QUEtiapine 25 milliGRAM(s) Oral at bedtime    PRN MEDICATIONS  acetaminophen   Tablet .. 650 milliGRAM(s) Oral every 4 hours PRN  morphine  - Injectable 1 milliGRAM(s) IV Push every 4 hours PRN  oxycodone    5 mG/acetaminophen 325 mG 1 Tablet(s) Oral every 4 hours PRN  zolpidem 5 milliGRAM(s) Oral at bedtime PRN    VITALS:   ICU Vital Signs Last 24 Hrs  T(C): 35.9 (25 Nov 2020 00:00), Max: 37 (24 Nov 2020 16:00)  T(F): 96.7 (25 Nov 2020 00:00), Max: 98.6 (24 Nov 2020 16:00)  HR: 102 (25 Nov 2020 05:00) (92 - 125)  BP: 110/66 (25 Nov 2020 05:00) (89/62 - 113/72)  BP(mean): 69 (24 Nov 2020 12:00) (69 - 69)  ABP: --  ABP(mean): --  RR: 20 (25 Nov 2020 00:00) (20 - 20)  SpO2: 95% (25 Nov 2020 05:43) (92% - 99%)      LABS:                        13.5   30.23 )-----------( 139      ( 25 Nov 2020 05:27 )             41.2     11-25    137  |  101  |  106<HH>  ----------------------------<  125<H>  4.6   |  20  |  1.8<H>    Ca    8.0<L>      25 Nov 2020 05:27    TPro  5.1<L>  /  Alb  2.7<L>  /  TBili  0.5  /  DBili  x   /  AST  22  /  ALT  17  /  AlkPhos  83  11-25    PTT - ( 25 Nov 2020 05:27 )  PTT:128.4 sec      Troponin T, Serum: <0.01 ng/mL (11-24-20 @ 20:00)  Troponin T, Serum: <0.01 ng/mL (11-24-20 @ 13:57)      Culture - Blood (collected 22 Nov 2020 20:50)  Source: .Blood None  Gram Stain (23 Nov 2020 20:33):    Growth in aerobic bottle: Gram Positive Cocci in Clusters    Growth in anaerobic bottle: Gram Positive Cocci in Clusters  Preliminary Report (24 Nov 2020 17:10):    Growth in aerobic and anaerobic bottles: Staphylococcus aureus    "Due to technical problems, Proteus sp. will Not be reported as part of    the BCID panel until further notice"    ***Blood Panel PCR results on this specimen are available    approximately 3 hours after the Gram stain result.***    Gram stain, PCR, and/or culture results may not always    correspond due to difference in methodologies.    ************************************************************    This PCR assay was performed using [employer].    The following targets are tested for: Enterococcus,    vancomycin resistant enterococci, Listeria monocytogenes,    coagulase negative staphylococci, S. aureus,    methicillin resistant S. aureus, Streptococcus agalactiae    (Group B), S. pneumoniae, S. pyogenes (Group A),    Acinetobacter baumannii, Enterobacter cloacae, E. coli,    Klebsiella oxytoca, K. pneumoniae, Proteus sp.,    Serratia marcescens, Haemophilus influenzae,    Neisseria meningitidis, Pseudomonas aeruginosa, Candida    albicans, C. glabrata, C krusei, C parapsilosis,    C. tropicalis and the KPC resistance gene.  Organism: Blood Culture PCR (23 Nov 2020 21:07)  Organism: Blood Culture PCR (23 Nov 2020 21:07)      CARDIAC MARKERS ( 24 Nov 2020 20:00 )  x     / <0.01 ng/mL / x     / x     / x      CARDIAC MARKERS ( 24 Nov 2020 13:57 )  x     / <0.01 ng/mL / x     / x     / x                   Procalcitonin, Serum: 0.84 (11-23)  Procalcitonin, Serum: 0.48 (11-22)  Procalcitonin, Serum: 0.13 (11-21)    C-Reactive Protein, Serum: 10.50 (11-24)  C-Reactive Protein, Serum: 7.37 (11-23)  C-Reactive Protein, Serum: 0.18 (11-19)  C-Reactive Protein, Serum: 0.39 (11-17)  C-Reactive Protein, Serum: 0.94 (11-15)  C-Reactive Protein, Serum: 3.14 (11-13)  C-Reactive Protein, Serum: 9.74 (11-11)    Ferritin, Serum: 1847 (11-23)  Ferritin, Serum: 1176 (11-21)  Ferritin, Serum: 1183 (11-17)  Ferritin, Serum: 1327 (11-15)  Ferritin, Serum: 1516 (11-13)      D-Dimer Assay, Quantitative: 589 (11-25)  D-Dimer Assay, Quantitative: 544 (11-24)  D-Dimer Assay, Quantitative: 779 (11-23)  D-Dimer Assay, Quantitative: 481 (11-22)  D-Dimer Assay, Quantitative: 417 (11-21)  D-Dimer Assay, Quantitative: 473 (11-17)  D-Dimer Assay, Quantitative: 698 (11-15)  D-Dimer Assay, Quantitative: 979 (11-13)      RADIOLOGY:      < from: Xray Chest 1 View- PORTABLE-Routine (Xray Chest 1 View- PORTABLE-Routine in AM.) (11.23.20 @ 08:40) >  Impression:    No significant interval change bilateral interstitial opacities.     No pneumothorax    < end of copied text >    PHYSICAL EXAM:    GENERAL: NAD  CHEST/LUNG: coarse bs  HEART: IRIR; No murmurs, rubs, or gallops  ABDOMEN: Bowel sounds present; Soft, Nontender, Nondistended.   EXTREMITIES: no edema  NERVOUS SYSTEM:  Alert & Oriented X3,

## 2020-11-25 NOTE — PROGRESS NOTE ADULT - ASSESSMENT
· Assessment	  74 yo male, pmh of AAA s/p repari, htn, hld, cad, presents to ed for sob and severe weakness, Pt was dx with covid-19  10 days ago, today felt more weak and sob, ems found the pt to be hypoxic     IMPRESSION;  COVID19 with severe/critical  illness. Hypoxemia BIPAP  . CXR >50% opacities.  Pt is in the late immune/inflammatory  mediated phase based on the onset of symptoms >10 d ( anti virals / plasma of no benefit )  Serology positive   Cytokine response  Elevation of the inflammatory markers which have been decreasing  -procalcitonin of 0.17 not suggestive of a bacterial PNA/infection  -Ddimers 3262>698>473  -Ferritin 2998>1516>1327  -CRP 16.49>3.14>0.94  BCx LALY 11/22    RECOMMENDATIONS;  RDV/ Plasma of no benefit  Dexamethasone 6 mg iv q24h for 10 days 11/10-20 ( or until discharge ) or equivalent glucocorticoid dose may be substituted. Equivalent total dosis of alternative steroids are Methylprednisolone 32 mg and prednisone 40 mg q24h.  Monitor for side effects: hyperglycemia, neurological ( agitation/confusion), adrenal suppression, bacterial and fungal infections  S/p Tocilizumab 400 mg iv x once.11/11.   Anticoagulation as per team  nafcillin 2 gm iv q4h  repeat BCs daily starting 11/26 till repeatedly NG

## 2020-11-25 NOTE — CHART NOTE - NSCHARTNOTEFT_GEN_A_CORE
Registered Dietitian Follow-Up--this note was charted remotely      Patient Profile Reviewed                           Yes [x]   No []     Nutrition History Previously Obtained        Yes []  No [x]--unable to obtain as both pt and pt's emergency contact unreachable via phone despite multiple attempts.     Pertinent Medical Interventions: This morning pt was hypothermic, tachycardic, low BP and not handling HFNC very well, therefore put on BIPAP.      Diet order: DASH/TLC--spoke with pt's RN who reports that pt was decompensating, therefore was put on BIPAP this morning. Pt unable to consume po at this time. Per flow sheets, pt only consumed 30% of both breakfast and lunch yesterday.      Anthropometrics:  - Ht. 70"   - Wt. 195#/88.5kg (11/10)--no new wts   - %wt change  - BMI: 28.0  - IBW: 166#     Pertinent Lab Data: (11/25): H/H 13.5/41.2, , Cr. 1.8, Gluc 125, GFR 36     Pertinent Meds: heparin, aspirin, abx, amiodarone, morphine, protonix, lipitor      Physical Findings:  - Appearance: alert and oriented; no edema noted   - GI function: LBM 11/17   - Oral/Mouth cavity: none reported   - Skin: ecchymosis       Nutrition Requirements  Weight Used: CBW: 195#/88.5kg; needs continued from RD note on 11/18      est kcal needs = 6603-5405 kcal/day (MSJ x1.2-1.3, aim toward lower end)  est protein needs =  g/day (1.0-1.2 g/kg CBW, aim toward lower end)   est fluid needs = per CEU     Nutrient Intake: not meeting kcal/pro needs at this time      Nutrition Diagnostic #1  Problem: Inadequate oral intake   Etiology: acute hypoxic respiratory failure 2/2 SARS covid-19 pneumonia  Statement: 0-30% po intake per staff and EMR     Nutrition Intervention: meals and snacks, medical food supplements, nutrition-related medication management     Recommendations:   1. Order Ensure Enlive BID  2. Consider bowel regimen as no bm >1 week--all recs discussed with LIP ([id number])     Goal/Expected Outcome: Pt to have at least 1 bm and to consume >50% po intake of meals, snacks, and supplements within 3 days      Indicator/Monitoring: RD to monitor diet order, energy intake, renal/glucose profiles, NFPF

## 2020-11-25 NOTE — PROGRESS NOTE ADULT - SUBJECTIVE AND OBJECTIVE BOX
MIGUEL ALMODOVAR  75y, Male    All available historical data reviewed    OVERNIGHT EVENTS:  no fevers  bipap    ROS:  General: Denies rigors, nightsweats  HEENT: Denies headache, rhinorrhea, sore throat, eye pain  CV: Denies CP, palpitations  PULM: Denies wheezing, hemoptysis  GI: Denies hematemesis, hematochezia, melena  : Denies discharge, hematuria  MSK: Denies arthralgias, myalgias  SKIN: Denies rash, lesions  NEURO: Denies paresthesias, weakness  PSYCH: Denies depression, anxiety    VITALS:  T(F): 94.2, Max: 98.6 (11-24-20 @ 16:00)  HR: 117  BP: 92/54  RR: 20Vital Signs Last 24 Hrs  T(C): 34.6 (25 Nov 2020 09:21), Max: 37 (24 Nov 2020 16:00)  T(F): 94.2 (25 Nov 2020 09:21), Max: 98.6 (24 Nov 2020 16:00)  HR: 117 (25 Nov 2020 09:21) (92 - 125)  BP: 92/54 (25 Nov 2020 09:21) (89/62 - 113/72)  BP(mean): 68 (25 Nov 2020 09:21) (68 - 69)  RR: 20 (25 Nov 2020 09:21) (20 - 28)  SpO2: 96% (25 Nov 2020 08:31) (92% - 97%)    TESTS & MEASUREMENTS:                        13.5   30.23 )-----------( 139      ( 25 Nov 2020 05:27 )             41.2     11-25    137  |  101  |  106<HH>  ----------------------------<  125<H>  4.6   |  20  |  1.8<H>    Ca    8.0<L>      25 Nov 2020 05:27    TPro  5.1<L>  /  Alb  2.7<L>  /  TBili  0.5  /  DBili  x   /  AST  22  /  ALT  17  /  AlkPhos  83  11-25    LIVER FUNCTIONS - ( 25 Nov 2020 05:27 )  Alb: 2.7 g/dL / Pro: 5.1 g/dL / ALK PHOS: 83 U/L / ALT: 17 U/L / AST: 22 U/L / GGT: x             Culture - Blood (collected 11-22-20 @ 20:50)  Source: .Blood None  Gram Stain (11-23-20 @ 20:33):    Growth in aerobic bottle: Gram Positive Cocci in Clusters    Growth in anaerobic bottle: Gram Positive Cocci in Clusters  Preliminary Report (11-24-20 @ 17:10):    Growth in aerobic and anaerobic bottles: Staphylococcus aureus    "Due to technical problems, Proteus sp. will Not be reported as part of    the BCID panel until further notice"    ***Blood Panel PCR results on this specimen are available    approximately 3 hours after the Gram stain result.***    Gram stain, PCR, and/or culture results may not always    correspond due to difference in methodologies.    ************************************************************    This PCR assay was performed using [employer].    The following targets are tested for: Enterococcus,    vancomycin resistant enterococci, Listeria monocytogenes,    coagulase negative staphylococci, S. aureus,    methicillin resistant S. aureus, Streptococcus agalactiae    (Group B), S. pneumoniae, S. pyogenes (Group A),    Acinetobacter baumannii, Enterobacter cloacae, E. coli,    Klebsiella oxytoca, K. pneumoniae, Proteus sp.,    Serratia marcescens, Haemophilus influenzae,    Neisseria meningitidis, Pseudomonas aeruginosa, Candida    albicans, C. glabrata, C krusei, C parapsilosis,    C. tropicalis and the KPC resistance gene.  Organism: Blood Culture PCR (11-23-20 @ 21:07)  Organism: Blood Culture PCR (11-23-20 @ 21:07)      -  Staphylococcus aureus: Detec Any isolate of Staphylococcus aureus from a blood culture is NOT considered a contaminant.      Method Type: PCR            RADIOLOGY & ADDITIONAL TESTS:  Personal review of radiological diagnostics performed  Echo and EKG results noted when applicable.     MEDICATIONS:  acetaminophen   Tablet .. 650 milliGRAM(s) Oral every 4 hours PRN  aspirin enteric coated 81 milliGRAM(s) Oral daily  atorvastatin 80 milliGRAM(s) Oral at bedtime  BACItracin   Ointment 1 Application(s) Topical four times a day  ceFAZolin   IVPB 2000 milliGRAM(s) IV Intermittent every 8 hours  dexAMETHasone  Injectable 6 milliGRAM(s) IV Push daily  heparin  Infusion 1000 Unit(s)/Hr IV Continuous <Continuous>  influenza   Vaccine 0.5 milliLiter(s) IntraMuscular once  lactated ringers Bolus 500 milliLiter(s) IV Bolus once  lidocaine   Patch 1 Patch Transdermal daily  metoprolol tartrate Injectable 5 milliGRAM(s) IV Push every 6 hours  morphine  - Injectable 1 milliGRAM(s) IV Push every 4 hours PRN  oxycodone    5 mG/acetaminophen 325 mG 1 Tablet(s) Oral every 4 hours PRN  pantoprazole    Tablet 40 milliGRAM(s) Oral before breakfast  QUEtiapine 50 milliGRAM(s) Oral at bedtime  QUEtiapine 25 milliGRAM(s) Oral at bedtime  zolpidem 5 milliGRAM(s) Oral at bedtime PRN      ANTIBIOTICS:  ceFAZolin   IVPB 2000 milliGRAM(s) IV Intermittent every 8 hours

## 2020-11-25 NOTE — PROGRESS NOTE ADULT - SUBJECTIVE AND OBJECTIVE BOX
OVERNIGHT EVENTS: Events noted, on BIPAP 65%. RU back pain    Vital Signs Last 24 Hrs  T(C): 34.6 (25 Nov 2020 09:21), Max: 37 (24 Nov 2020 16:00)  T(F): 94.2 (25 Nov 2020 09:21), Max: 98.6 (24 Nov 2020 16:00)  HR: 117 (25 Nov 2020 09:21) (92 - 125)  BP: 92/54 (25 Nov 2020 09:21) (89/62 - 113/72)  BP(mean): 68 (25 Nov 2020 09:21) (68 - 69)  RR: 20 (25 Nov 2020 09:21) (20 - 20)  SpO2: 95% (25 Nov 2020 05:43) (92% - 99%)    PHYSICAL EXAMINATION:    GENERAL: ILL LOOKING    HEENT: Head is normocephalic and atraumatic.     NECK: Supple.    LUNGS: BL CRACKLES    HEART: Regular rate and rhythm without murmur.    ABDOMEN: Soft, nontender, and nondistended.      EXTREMITIES: Without any cyanosis, clubbing, rash, lesions or edema.    NEUROLOGIC: Grossly intact.    SKIN: No ulceration or induration present.      LABS:                        13.5   30.23 )-----------( 139      ( 25 Nov 2020 05:27 )             41.2     11-25    137  |  101  |  106<HH>  ----------------------------<  125<H>  4.6   |  20  |  1.8<H>    Ca    8.0<L>      25 Nov 2020 05:27    TPro  5.1<L>  /  Alb  2.7<L>  /  TBili  0.5  /  DBili  x   /  AST  22  /  ALT  17  /  AlkPhos  83  11-25    PTT - ( 25 Nov 2020 05:27 )  PTT:128.4 sec      CARDIAC MARKERS ( 24 Nov 2020 20:00 )  x     / <0.01 ng/mL / x     / x     / x      CARDIAC MARKERS ( 24 Nov 2020 13:57 )  x     / <0.01 ng/mL / x     / x     / x          D-Dimer Assay, Quantitative: 589 ng/mL DDU (11-25-20 @ 05:27)        Procalcitonin, Serum: 0.84 ng/mL (11-23-20 @ 08:11)  Procalcitonin, Serum: 0.48 ng/mL (11-22-20 @ 20:50)        11-24-20 @ 07:01  -  11-25-20 @ 07:00  --------------------------------------------------------  IN: 480 mL / OUT: 700 mL / NET: -220 mL        MICROBIOLOGY:  Culture Results:   Growth in aerobic and anaerobic bottles: Staphylococcus aureus  "Due to technical problems, Proteus sp. will Not be reported as part of  the BCID panel until further notice"  ***Blood Panel PCR results on this specimen are available  approximately 3 hours after the Gram stain result.***  Gram stain, PCR, and/or culture results may not always  correspond due to difference in methodologies.  ************************************************************  This PCR assay was performed using [employer].  The following targets are tested for: Enterococcus,  vancomycin resistant enterococci, Listeria monocytogenes,  coagulase negative staphylococci, S. aureus,  methicillin resistant S. aureus, Streptococcus agalactiae  (Group B), S. pneumoniae, S. pyogenes (Group A),  Acinetobacter baumannii, Enterobacter cloacae, E. coli,  Klebsiella oxytoca, K. pneumoniae, Proteus sp.,  Serratia marcescens, Haemophilus influenzae,  Neisseria meningitidis, Pseudomonas aeruginosa, Candida  albicans, C. glabrata, C krusei, C parapsilosis,  C. tropicalis and the KPC resistance gene. (11-22 @ 20:50)      MEDICATIONS  (STANDING):  aspirin enteric coated 81 milliGRAM(s) Oral daily  atorvastatin 80 milliGRAM(s) Oral at bedtime  BACItracin   Ointment 1 Application(s) Topical four times a day  ceFAZolin   IVPB 2000 milliGRAM(s) IV Intermittent every 8 hours  dexAMETHasone  Injectable 6 milliGRAM(s) IV Push daily  heparin  Infusion 1000 Unit(s)/Hr (10 mL/Hr) IV Continuo  influenza   Vaccine 0.5 milliLiter(s) IntraMuscular once  lactated ringers Bolus 500 milliLiter(s) IV Bolus once  lidocaine   Patch 1 Patch Transdermal daily  metoprolol tartrate Injectable 5 milliGRAM(s) IV Push every 6 hours  pantoprazole    Tablet 40 milliGRAM(s) Oral before breakfast  QUEtiapine 50 milliGRAM(s) Oral at bedtime  QUEtiapine 25 milliGRAM(s) Oral at bedtime    MEDICATIONS  (PRN):  acetaminophen   Tablet .. 650 milliGRAM(s) Oral every 4 hours PRN Mild Pain (1 - 3)  morphine  - Injectable 1 milliGRAM(s) IV Push every 4 hours PRN Severe Pain (7 - 10)  oxycodone    5 mG/acetaminophen 325 mG 1 Tablet(s) Oral every 4 hours PRN Moderate Pain (4 - 6)  zolpidem 5 milliGRAM(s) Oral at bedtime PRN Insomnia      RADIOLOGY & ADDITIONAL STUDIES:

## 2020-11-25 NOTE — PROGRESS NOTE ADULT - ATTENDING COMMENTS
I have personally seen and examined this patient.  I have fully participated in the care of this patient.  I have reviewed pertinent clinical information, including history, physical exam, plan and note.   I have reviewed relevant imaging and diagnostic studies personally. I agree with resident note above and plan of care, edited and corrected where applicable.    .. Persistent Acute Hypoxic Respiratory Failure secondary to COVID19 pneumonia   .. Severe COVID19 infection with evidence of underlying inflammatory cytokine storm  .. AFib on intravenous heparin drip according to PTT; uncontrolled rate  .. SVITLANA, suspected ATN    PLAN  .. As per above note, in addition: attempt to ensure euvolemia and if still uncontrolled AFib with low BP ; r/o secondary causes and if deemed primary cardiac; might attempt anti-arryhtmics like Amio if no room for Calcium Channel Blocker or Beta-Blocker therapy.   .. O2 support, currently in need for Non-invasive positive pressure ventilation (NIPPV) I have personally seen and examined this patient.  I have fully participated in the care of this patient.  I have reviewed pertinent clinical information, including history, physical exam, plan and note.   I have reviewed relevant imaging and diagnostic studies personally. I agree with resident note above and plan of care, edited and corrected where applicable.    .. Persistent Acute Hypoxic Respiratory Failure secondary to COVID19 pneumonia   .. Severe COVID19 infection with evidence of underlying inflammatory cytokine storm  .. AFib on intravenous heparin drip according to PTT; uncontrolled rate  .. SVITLANA, suspected ATN    PLAN  .. As per above note, in addition: attempt to ensure euvolemia and if still uncontrolled AFib with low BP ; r/o secondary causes and if deemed primary cardiac; might attempt anti-arryhtmics like Amio if no room for Calcium Channel Blocker or Beta-Blocker therapy.   .. O2 support, currently in need for Non-invasive positive pressure ventilation (NIPPV)  .. Medical team frequently updating family members/ refer to most recent Goals of Care Conversation documentation    Case discussed with resident assigned.   At high risk for cardiovascular events and respiratory complications despite all care

## 2020-11-25 NOTE — PROGRESS NOTE ADULT - ASSESSMENT
IMPRESSION:    Acute hypoxic respiratory failure STILL ON high oxygen  COVID 19 pneumonia   HFMREF  Possible superimposed infection   Worsening renal function  Blood cx reviewed staph aureus bacteremia    SUGGEST:      CNS: Avoid CNS depressant.  Seroquel 50 mg     HEENT: Oral care    PULMONARY:  HOB @ 45 degrees.  HHFNC alternate with BIPAP, keep Sao2 92 to 96%, Incentive spirometry    CARDIOVASCULAR:  Avoid volume overload. echo ro enodocarditis    GI: GI prophylaxis.  Feeding as tolerated     RENAL:  Follow up lytes.  Correct as needed, renal eval, repeat CMP    INFECTIOUS DISEASE:  per ID. repeat CX    HEMATOLOGICAL:  AC     ENDOCRINE:  Follow up FS.  Insulin protocol if needed.      CEU  poor prognosis

## 2020-11-26 NOTE — PROGRESS NOTE ADULT - ASSESSMENT
IMPRESSION:    Acute hypoxic respiratory failure STILL ON high oxygen  COVID 19 pneumonia   HFMREF  Possible superimposed infection   Worsening renal function  Blood cx reviewed staph aureus bacteremia    SUGGEST:      CNS: Avoid CNS depressant.  Seroquel 50 mg     HEENT: Oral care    PULMONARY:  HOB @ 45 degrees.  HHFNC alternate with BIPAP, keep Sao2 92 to 96%, Incentive spirometry    CARDIOVASCULAR:  start  NS 50cc/ hr     GI: GI prophylaxis.  Feeding as tolerated     RENAL:  Follow up lytes.  Correct as needed, renal eval, repeat CMP  renal  consult   INFECTIOUS DISEASE:  per ID. repeat CX on nafcillin    HEMATOLOGICAL:  AC     ENDOCRINE:  Follow up FS.  Insulin protocol if needed.  CEU  poor prognosis

## 2020-11-26 NOTE — PROGRESS NOTE ADULT - SUBJECTIVE AND OBJECTIVE BOX
Patient is a 75y old  Male who presents with a chief complaint of covid-19 (25 Nov 2020 10:58)      Over Night Events:  Patient seen and examined.   still on high flow 100%    ROS:  See HPI    PHYSICAL EXAM    ICU Vital Signs Last 24 Hrs  T(C): 35.8 (26 Nov 2020 08:23), Max: 36.1 (26 Nov 2020 00:23)  T(F): 96.5 (26 Nov 2020 08:23), Max: 97 (26 Nov 2020 00:23)  HR: 79 (26 Nov 2020 08:23) (78 - 125)  BP: 113/62 (26 Nov 2020 08:23) (92/54 - 126/65)  BP(mean): --  ABP: --  ABP(mean): --  RR: 20 (26 Nov 2020 08:23) (20 - 45)  SpO2: 95% (26 Nov 2020 08:23) (90% - 98%)      General: awake   HEENT:       sanchez         Lymph Nodes: NO cervical LN   Lungs: Bilateral crackles   Cardiovascular: Regular   Abdomen: Soft, Positive BS  Extremities: No clubbing   Skin: warm   Neurological: no deficit   Musculoskeletal: move all ext     I&O's Detail    25 Nov 2020 07:01  -  26 Nov 2020 07:00  --------------------------------------------------------  IN:  Total IN: 0 mL    OUT:    Voided (mL): 150 mL  Total OUT: 150 mL    Total NET: -150 mL          LABS:                          12.0   26.21 )-----------( 113      ( 26 Nov 2020 06:55 )             36.1         26 Nov 2020 06:55    132    |  94     |  129    ----------------------------<  118    4.9     |  22     |  2.3      Ca    7.5        26 Nov 2020 06:55    TPro  4.6    /  Alb  2.5    /  TBili  1.4    /  DBili  x      /  AST  24     /  ALT  10     /  AlkPhos  70     26 Nov 2020 06:55  Amylase x     lipase x                                                 PTT - ( 26 Nov 2020 06:55 )  PTT:28.1 sec                                             CARDIAC MARKERS ( 24 Nov 2020 20:00 )  x     / <0.01 ng/mL / x     / x     / x      CARDIAC MARKERS ( 24 Nov 2020 13:57 )  x     / <0.01 ng/mL / x     / x     / x                                                                                                                                                 MEDICATIONS  (STANDING):  aMIOdarone Infusion 0.999 mG/Min (33.3 mL/Hr) IV Continuous <Continuous>  aMIOdarone Infusion 0.5 mG/Min (16.7 mL/Hr) IV Continuous <Continuous>  aspirin enteric coated 81 milliGRAM(s) Oral daily  atorvastatin 80 milliGRAM(s) Oral at bedtime  BACItracin   Ointment 1 Application(s) Topical four times a day  dexAMETHasone  Injectable 6 milliGRAM(s) IV Push daily  heparin  Infusion 1000 Unit(s)/Hr (10 mL/Hr) IV Continuous <Continuous>  influenza   Vaccine 0.5 milliLiter(s) IntraMuscular once  lidocaine   Patch 1 Patch Transdermal daily  nafcillin  IVPB 2 Gram(s) IV Intermittent every 4 hours  pantoprazole    Tablet 40 milliGRAM(s) Oral before breakfast  QUEtiapine 50 milliGRAM(s) Oral at bedtime  QUEtiapine 25 milliGRAM(s) Oral at bedtime    MEDICATIONS  (PRN):  acetaminophen   Tablet .. 650 milliGRAM(s) Oral every 4 hours PRN Mild Pain (1 - 3)  morphine  - Injectable 1 milliGRAM(s) IV Push every 4 hours PRN Severe Pain (7 - 10)  oxycodone    5 mG/acetaminophen 325 mG 1 Tablet(s) Oral every 4 hours PRN Moderate Pain (4 - 6)  zolpidem 5 milliGRAM(s) Oral at bedtime PRN Insomnia          Xrays:  TLC:  OG:  ET tube:                                                                                    b/l opacity    ECHO:  [facility] ICU:

## 2020-11-26 NOTE — CHART NOTE - NSCHARTNOTEFT_GEN_A_CORE
74 yo male, pmh of AAA s/p repair, htn, hld, cad, presents to ed for sob and severe weakness. Admitted for AHRF secondary to COVID 19      Acute hypoxic respiratory failure STILL ON high oxygen  COVID 19 pneumonia   HFMREF  Possible superimposed infection   Worsening renal function  Blood cx reviewed staph aureus bacteremia        Update;  - SVITLANA - Kidney function worsening, started patient on IVF NS: 50 cc/hr, Nephrology consult pending  - Covid 19 pna : patient was initially on bipap > weaned it off to HFNC 50/100, currently saturating 85-92%, bipap at night, and prn, if patient further decompensates  - on heparin drip, adusted to 12, based on ptt. Will repeat PTT at 4pm and 11: 30 pm  - MSSA bacteremia, on Nafcillin, repeat blood cultures 74 yo male, pmh of AAA s/p repair, htn, hld, cad, presents to ed for sob and severe weakness. Admitted for AHRF secondary to COVID 19      Acute hypoxic respiratory failure STILL ON high oxygen  COVID 19 pneumonia   HFREF  Possible superimposed infection   Worsening renal function  Blood cx reviewed staph aureus bacteremia        Update;  - SVITLANA - Kidney function worsening, started patient on IVF NS: 50 cc/hr, Nephrology consult pending  - Covid 19 pna : patient was initially on bipap > weaned it off to HFNC 50/100, currently saturating 85-92%, bipap at night, and prn, if patient further decompensates  - on heparin drip, adusted to 12, based on ptt. Will repeat PTT at 4pm and 11: 30 pm  - MSSA bacteremia, on Nafcillin, repeat blood cultures    Attending Addendum-as above post my independent exam/eval this morning        VITALS:  T(F): 96.8 (11-26-20 @ 17:08), Max: 97 (11-26-20 @ 00:23)  HR: 79 (11-26-20 @ 17:08)  BP: 132/69 (11-26-20 @ 17:08) (98/56 - 132/69)  RR: 22 (11-26-20 @ 17:08)  SpO2: 98% (11-26-20 @ 17:08)      GEN: ETT   PULM: DISTANT HEART SOUNDS, NO WHEEZES AUDIBLE  CV: [id number] BUT FAINT  GI: +BS, SOFT, NT, ND  MSK: NO CLUBBING  DERM: NO CYANOSIS                      12.0   26.21 )-----------( 113      ( 26 Nov 2020 06:55 )             36.1     PTT - ( 26 Nov 2020 11:12 )  PTT:28.9 sec  11-26    132<L>  |  94<L>  |  129<HH>  ----------------------------<  118<H>  4.9   |  22  |  2.3<H>    Ca    7.5<L>      26 Nov 2020 06:55    TPro  4.6<L>  /  Alb  2.5<L>  /  TBili  1.4<H>  /  DBili  x   /  AST  24  /  ALT  10  /  AlkPhos  70  11-26    LIVER FUNCTIONS - ( 26 Nov 2020 06:55 )  Alb: 2.5 g/dL / Pro: 4.6 g/dL / ALK PHOS: 70 U/L / ALT: 10 U/L / AST: 24 U/L / GGT: x           CARDIAC MARKERS ( 24 Nov 2020 20:00 )  x     / <0.01 ng/mL / x     / x     / x            Culture - Blood (collected 11-22-20 @ 20:50)  Source: .Blood None  Gram Stain (11-23-20 @ 20:33):    Growth in aerobic bottle: Gram Positive Cocci in Clusters    Growth in anaerobic bottle: Gram Positive Cocci in Clusters  Final Report (11-25-20 @ 12:34):    Growth in aerobic and anaerobic bottles: Staphylococcus aureus      Organism: Blood Culture PCR  Staphylococcus aureus (11-25-20 @ 12:34)  Organism: Staphylococcus aureus (11-25-20 @ 12:34)      -  Ampicillin/Sulbactam: S <=8/4      -  Cefazolin: S <=4      -  Clindamycin: S <=0.25      -  Erythromycin: R >4      -  Gentamicin: S <=1 Should not be used as monotherapy      -  Oxacillin: S 1      -  Penicillin: R >8      -  RIF- Rifampin: S <=1 Should not be used as monotherapy      -  Tetra/Doxy: S <=1      -  Trimethoprim/Sulfamethoxazole: S <=0.5/9.5      -  Vancomycin: S 2      Method Type: HENRIETTA  Organism: Blood Culture PCR (11-25-20 @ 12:34)      -  Staphylococcus aureus: Detec Any isolate of Staphylococcus aureus from a blood culture is NOT considered a contaminant.      Method Type: PCR    MEDICATIONS  (STANDING):  aMIOdarone Infusion 0.999 mG/Min (33.3 mL/Hr) IV Continuous <Continuous>  aMIOdarone Infusion 0.5 mG/Min (16.7 mL/Hr) IV Continuous <Continuous>  aspirin enteric coated 81 milliGRAM(s) Oral daily  atorvastatin 80 milliGRAM(s) Oral at bedtime  BACItracin   Ointment 1 Application(s) Topical four times a day  dexAMETHasone  Injectable 6 milliGRAM(s) IV Push daily  heparin  Infusion 1200 Unit(s)/Hr (12 mL/Hr) IV Continuous <Continuous>  influenza   Vaccine 0.5 milliLiter(s) IntraMuscular once  lidocaine   Patch 1 Patch Transdermal daily  nafcillin  IVPB 2 Gram(s) IV Intermittent every 4 hours  pantoprazole    Tablet 40 milliGRAM(s) Oral before breakfast  QUEtiapine 50 milliGRAM(s) Oral at bedtime  QUEtiapine 25 milliGRAM(s) Oral at bedtime  sodium chloride 0.9%. 1000 milliLiter(s) (50 mL/Hr) IV Continuous <Continuous>    MEDICATIONS  (PRN):  acetaminophen   Tablet .. 650 milliGRAM(s) Oral every 4 hours PRN Mild Pain (1 - 3)  morphine  - Injectable 1 milliGRAM(s) IV Push every 4 hours PRN Severe Pain (7 - 10)  oxycodone    5 mG/acetaminophen 325 mG 1 Tablet(s) Oral every 4 hours PRN Moderate Pain (4 - 6)  zolpidem 5 milliGRAM(s) Oral at bedtime PRN Insomnia      ACUTE RESP FAILURE  SARS COV 2 PNA  SUSPECTED STAPH AUREUS SEPSIS  SVITLANA  -DECADRON/NAFCILLIN/IVF/HEPARIN IV/IVF 76 yo male, pmh of AAA s/p repair, htn, hld, cad, presents to ed for sob and severe weakness. Admitted for AHRF secondary to COVID 19      Acute hypoxic respiratory failure STILL ON high oxygen  COVID 19 pneumonia   HFREF  Possible superimposed infection   Worsening renal function  Blood cx reviewed staph aureus bacteremia        Update;  - VSITLANA - Kidney function worsening, started patient on IVF NS: 50 cc/hr, Nephrology consult pending  - Covid 19 pna : patient was initially on bipap > weaned it off to HFNC 50/100, currently saturating 85-92%, bipap at night, and prn, if patient further decompensates  - on heparin drip, adusted to 12, based on ptt. Will repeat PTT at 4pm and 11: 30 pm  - MSSA bacteremia, on Nafcillin, repeat blood cultures    Attending Addendum-as above post my independent exam/eval this morning        VITALS:  T(F): 96.8 (11-26-20 @ 17:08), Max: 97 (11-26-20 @ 00:23)  HR: 79 (11-26-20 @ 17:08)  BP: 132/69 (11-26-20 @ 17:08) (98/56 - 132/69)  RR: 22 (11-26-20 @ 17:08)  SpO2: 98% (11-26-20 @ 17:08)      GEN: ETT   PULM: DISTANT HEART SOUNDS, NO WHEEZES AUDIBLE  CV: HHXF1W2 BUT FAINT  GI: +BS, SOFT, NT, ND  MSK: NO CLUBBING  DERM: NO CYANOSIS                      12.0   26.21 )-----------( 113      ( 26 Nov 2020 06:55 )             36.1     PTT - ( 26 Nov 2020 11:12 )  PTT:28.9 sec  11-26    132<L>  |  94<L>  |  129<HH>  ----------------------------<  118<H>  4.9   |  22  |  2.3<H>    Ca    7.5<L>      26 Nov 2020 06:55    TPro  4.6<L>  /  Alb  2.5<L>  /  TBili  1.4<H>  /  DBili  x   /  AST  24  /  ALT  10  /  AlkPhos  70  11-26    LIVER FUNCTIONS - ( 26 Nov 2020 06:55 )  Alb: 2.5 g/dL / Pro: 4.6 g/dL / ALK PHOS: 70 U/L / ALT: 10 U/L / AST: 24 U/L / GGT: x           CARDIAC MARKERS ( 24 Nov 2020 20:00 )  x     / <0.01 ng/mL / x     / x     / x            Culture - Blood (collected 11-22-20 @ 20:50)  Source: .Blood None  Gram Stain (11-23-20 @ 20:33):    Growth in aerobic bottle: Gram Positive Cocci in Clusters    Growth in anaerobic bottle: Gram Positive Cocci in Clusters  Final Report (11-25-20 @ 12:34):    Growth in aerobic and anaerobic bottles: Staphylococcus aureus      Organism: Blood Culture PCR  Staphylococcus aureus (11-25-20 @ 12:34)  Organism: Staphylococcus aureus (11-25-20 @ 12:34)      -  Ampicillin/Sulbactam: S <=8/4      -  Cefazolin: S <=4      -  Clindamycin: S <=0.25      -  Erythromycin: R >4      -  Gentamicin: S <=1 Should not be used as monotherapy      -  Oxacillin: S 1      -  Penicillin: R >8      -  RIF- Rifampin: S <=1 Should not be used as monotherapy      -  Tetra/Doxy: S <=1      -  Trimethoprim/Sulfamethoxazole: S <=0.5/9.5      -  Vancomycin: S 2      Method Type: HENRIETTA  Organism: Blood Culture PCR (11-25-20 @ 12:34)      -  Staphylococcus aureus: Detec Any isolate of Staphylococcus aureus from a blood culture is NOT considered a contaminant.      Method Type: PCR    MEDICATIONS  (STANDING):  aMIOdarone Infusion 0.999 mG/Min (33.3 mL/Hr) IV Continuous <Continuous>  aMIOdarone Infusion 0.5 mG/Min (16.7 mL/Hr) IV Continuous <Continuous>  aspirin enteric coated 81 milliGRAM(s) Oral daily  atorvastatin 80 milliGRAM(s) Oral at bedtime  BACItracin   Ointment 1 Application(s) Topical four times a day  dexAMETHasone  Injectable 6 milliGRAM(s) IV Push daily  heparin  Infusion 1200 Unit(s)/Hr (12 mL/Hr) IV Continuous <Continuous>  influenza   Vaccine 0.5 milliLiter(s) IntraMuscular once  lidocaine   Patch 1 Patch Transdermal daily  nafcillin  IVPB 2 Gram(s) IV Intermittent every 4 hours  pantoprazole    Tablet 40 milliGRAM(s) Oral before breakfast  QUEtiapine 50 milliGRAM(s) Oral at bedtime  QUEtiapine 25 milliGRAM(s) Oral at bedtime  sodium chloride 0.9%. 1000 milliLiter(s) (50 mL/Hr) IV Continuous <Continuous>    MEDICATIONS  (PRN):  acetaminophen   Tablet .. 650 milliGRAM(s) Oral every 4 hours PRN Mild Pain (1 - 3)  morphine  - Injectable 1 milliGRAM(s) IV Push every 4 hours PRN Severe Pain (7 - 10)  oxycodone    5 mG/acetaminophen 325 mG 1 Tablet(s) Oral every 4 hours PRN Moderate Pain (4 - 6)  zolpidem 5 milliGRAM(s) Oral at bedtime PRN Insomnia      ACUTE RESP FAILURE  SARS COV 2 PNA  SUSPECTED STAPH AUREUS SEPSIS  SVITLANA  V.TACH  -DECADRON/NAFCILLIN/AMIO/IVF/HEPARIN GTT

## 2020-11-27 NOTE — PROGRESS NOTE ADULT - SUBJECTIVE AND OBJECTIVE BOX
MIGUEL ALMODOVAR  75y, Male    All available historical data reviewed    OVERNIGHT EVENTS:  no fevers  bipap    ROS:  General: Denies rigors, nightsweats  HEENT: Denies headache, rhinorrhea, sore throat, eye pain  CV: Denies CP, palpitations  PULM: Denies wheezing, hemoptysis  GI: Denies hematemesis, hematochezia, melena  : Denies discharge, hematuria  MSK: Denies arthralgias, myalgias  SKIN: Denies rash, lesions  NEURO: Denies paresthesias, weakness  PSYCH: Denies depression, anxiety    VITALS:  T(F): 95.4, Max: 96.9 (11-26-20 @ 20:30)  HR: 83  BP: 102/61  RR: 20Vital Signs Last 24 Hrs  T(C): 35.2 (27 Nov 2020 06:00), Max: 36.1 (26 Nov 2020 20:30)  T(F): 95.4 (27 Nov 2020 06:00), Max: 96.9 (26 Nov 2020 20:30)  HR: 83 (27 Nov 2020 10:00) (72 - 83)  BP: 102/61 (27 Nov 2020 10:00) (102/61 - 133/74)  BP(mean): --  RR: 20 (27 Nov 2020 10:00) (20 - 22)  SpO2: 95% (27 Nov 2020 10:00) (91% - 98%)    TESTS & MEASUREMENTS:                        11.6   30.27 )-----------( 120      ( 27 Nov 2020 08:05 )             35.1     11-27    130<L>  |  92<L>  |  141<HH>  ----------------------------<  134<H>  5.2<H>   |  21  |  3.6<H>    Ca    7.5<L>      27 Nov 2020 08:05  Phos  6.8     11-27  Mg     3.1     11-27    TPro  4.6<L>  /  Alb  2.5<L>  /  TBili  1.6<H>  /  DBili  x   /  AST  28  /  ALT  6   /  AlkPhos  67  11-27    LIVER FUNCTIONS - ( 27 Nov 2020 08:05 )  Alb: 2.5 g/dL / Pro: 4.6 g/dL / ALK PHOS: 67 U/L / ALT: 6 U/L / AST: 28 U/L / GGT: x             Culture - Blood (collected 11-22-20 @ 20:50)  Source: .Blood None  Gram Stain (11-23-20 @ 20:33):    Growth in aerobic bottle: Gram Positive Cocci in Clusters    Growth in anaerobic bottle: Gram Positive Cocci in Clusters  Final Report (11-25-20 @ 12:34):    Growth in aerobic and anaerobic bottles: Staphylococcus aureus    "Due to technical problems, Proteus sp. will Not be reported as part of    the BCID panel until further notice"    ***Blood Panel PCR results on this specimen are available    approximately 3 hours after the Gram stain result.***    Gram stain, PCR, and/or culture results may not always    correspond due to difference in methodologies.    ************************************************************    This PCR assay was performed using [employer].    The following targets are tested for: Enterococcus,    vancomycin resistant enterococci, Listeria monocytogenes,    coagulase negative staphylococci, S. aureus,    methicillin resistant S. aureus, Streptococcus agalactiae    (Group B), S. pneumoniae, S. pyogenes (Group A),    Acinetobacter baumannii, Enterobacter cloacae, E. coli,    Klebsiella oxytoca, K. pneumoniae, Proteus sp.,    Serratia marcescens, Haemophilus influenzae,    Neisseria meningitidis, Pseudomonas aeruginosa, Candida    albicans, C. glabrata, C krusei, C parapsilosis,    C. tropicalis and the KPC resistance gene.  Organism: Blood Culture PCR  Staphylococcus aureus (11-25-20 @ 12:34)  Organism: Staphylococcus aureus (11-25-20 @ 12:34)      -  Ampicillin/Sulbactam: S <=8/4      -  Cefazolin: S <=4      -  Clindamycin: S <=0.25      -  Erythromycin: R >4      -  Gentamicin: S <=1 Should not be used as monotherapy      -  Oxacillin: S 1      -  Penicillin: R >8      -  RIF- Rifampin: S <=1 Should not be used as monotherapy      -  Tetra/Doxy: S <=1      -  Trimethoprim/Sulfamethoxazole: S <=0.5/9.5      -  Vancomycin: S 2      Method Type: HENRIETTA  Organism: Blood Culture PCR (11-25-20 @ 12:34)      -  Staphylococcus aureus: Detec Any isolate of Staphylococcus aureus from a blood culture is NOT considered a contaminant.      Method Type: PCR            RADIOLOGY & ADDITIONAL TESTS:  Personal review of radiological diagnostics performed  Echo and EKG results noted when applicable.     MEDICATIONS:  acetaminophen   Tablet .. 650 milliGRAM(s) Oral every 4 hours PRN  aspirin enteric coated 81 milliGRAM(s) Oral daily  atorvastatin 80 milliGRAM(s) Oral at bedtime  BACItracin   Ointment 1 Application(s) Topical four times a day  dexAMETHasone  Injectable 6 milliGRAM(s) IV Push daily  diltiazem    Tablet 30 milliGRAM(s) Oral four times a day  heparin  Infusion 900 Unit(s)/Hr IV Continuous <Continuous>  influenza   Vaccine 0.5 milliLiter(s) IntraMuscular once  lidocaine   Patch 1 Patch Transdermal daily  morphine  - Injectable 1 milliGRAM(s) IV Push every 4 hours PRN  nafcillin  IVPB 2 Gram(s) IV Intermittent every 4 hours  oxycodone    5 mG/acetaminophen 325 mG 1 Tablet(s) Oral every 4 hours PRN  pantoprazole    Tablet 40 milliGRAM(s) Oral before breakfast  QUEtiapine 50 milliGRAM(s) Oral at bedtime  QUEtiapine 25 milliGRAM(s) Oral at bedtime  sodium chloride 0.9%. 1000 milliLiter(s) IV Continuous <Continuous>  zolpidem 5 milliGRAM(s) Oral at bedtime PRN      ANTIBIOTICS:  nafcillin  IVPB 2 Gram(s) IV Intermittent every 4 hours

## 2020-11-27 NOTE — PROGRESS NOTE ADULT - SUBJECTIVE AND OBJECTIVE BOX
SUBJECTIVE:    Patient is a 75y old Male who presents with a chief complaint of covid-19 (27 Nov 2020 06:19)    Currently admitted to medicine with the primary diagnosis of: COVID    Today is hospital day 17d.     Overnight Events:     No acute overnight events.     PAST MEDICAL & SURGICAL HISTORY  Abdominal aortic aneurysm    Essential hypertension    High cholesterol    Myocardial infarction    S/P aneurysm repair    ALLERGIES:  No Known Allergies    MEDICATIONS:  STANDING MEDICATIONS  aMIOdarone Infusion 0.999 mG/Min IV Continuous <Continuous>  aMIOdarone Infusion 0.5 mG/Min IV Continuous <Continuous>  aspirin enteric coated 81 milliGRAM(s) Oral daily  atorvastatin 80 milliGRAM(s) Oral at bedtime  BACItracin   Ointment 1 Application(s) Topical four times a day  dexAMETHasone  Injectable 6 milliGRAM(s) IV Push daily  heparin  Infusion 1200 Unit(s)/Hr IV Continuous <Continuous>  influenza   Vaccine 0.5 milliLiter(s) IntraMuscular once  lidocaine   Patch 1 Patch Transdermal daily  nafcillin  IVPB 2 Gram(s) IV Intermittent every 4 hours  pantoprazole    Tablet 40 milliGRAM(s) Oral before breakfast  QUEtiapine 50 milliGRAM(s) Oral at bedtime  QUEtiapine 25 milliGRAM(s) Oral at bedtime  sodium chloride 0.9%. 1000 milliLiter(s) IV Continuous <Continuous>    PRN MEDICATIONS  acetaminophen   Tablet .. 650 milliGRAM(s) Oral every 4 hours PRN  morphine  - Injectable 1 milliGRAM(s) IV Push every 4 hours PRN  oxycodone    5 mG/acetaminophen 325 mG 1 Tablet(s) Oral every 4 hours PRN  zolpidem 5 milliGRAM(s) Oral at bedtime PRN    VITALS:   ICU Vital Signs Last 24 Hrs  T(C): 35.2 (27 Nov 2020 06:00), Max: 36.1 (26 Nov 2020 20:30)  T(F): 95.4 (27 Nov 2020 06:00), Max: 96.9 (26 Nov 2020 20:30)  HR: 82 (27 Nov 2020 06:00) (72 - 82)  BP: 119/73 (27 Nov 2020 06:00) (119/73 - 133/74)  BP(mean): --  ABP: --  ABP(mean): --  RR: 20 (27 Nov 2020 06:00) (20 - 22)  SpO2: 96% (27 Nov 2020 06:00) (91% - 98%)      LABS:                        11.6   30.27 )-----------( 120      ( 27 Nov 2020 08:05 )             35.1     11-26    132<L>  |  94<L>  |  129<HH>  ----------------------------<  118<H>  4.9   |  22  |  2.3<H>    Ca    7.5<L>      26 Nov 2020 06:55    TPro  4.6<L>  /  Alb  2.5<L>  /  TBili  1.4<H>  /  DBili  x   /  AST  24  /  ALT  10  /  AlkPhos  70  11-26    PTT - ( 27 Nov 2020 08:05 )  PTT:>200.0 sec                          11.6 30.27 )-----------( 120      ( 27 Nov 2020 08:05 )             35.1 11-26    132  |  94  |  129  ----------------------------<  118  4.9   |  22  |  2.3  Ca    7.5      26 Nov 2020 06:55  TPro  4.6  /  Alb  2.5  /  TBili  1.4  /  DBili  x   /  AST  24  /  ALT  10  /  AlkPhos  70  11-26    Procalcitonin, Serum: 0.75 (11-25)  Procalcitonin, Serum: 0.84 (11-23)  Procalcitonin, Serum: 0.48 (11-22)  Procalcitonin, Serum: 0.13 (11-21)    C-Reactive Protein, Serum: 5.80 (11-25)  C-Reactive Protein, Serum: 10.50 (11-24)  C-Reactive Protein, Serum: 7.37 (11-23)  C-Reactive Protein, Serum: 0.18 (11-19)  C-Reactive Protein, Serum: 0.39 (11-17)  C-Reactive Protein, Serum: 0.94 (11-15)    Ferritin, Serum: 1847 (11-23)  Ferritin, Serum: 1176 (11-21)  Ferritin, Serum: 1183 (11-17)  Ferritin, Serum: 1327 (11-15)      D-Dimer Assay, Quantitative: 589 (11-25)  D-Dimer Assay, Quantitative: 544 (11-24)  D-Dimer Assay, Quantitative: 779 (11-23)  D-Dimer Assay, Quantitative: 481 (11-22)  D-Dimer Assay, Quantitative: 417 (11-21)  D-Dimer Assay, Quantitative: 473 (11-17)  D-Dimer Assay, Quantitative: 698 (11-15)      RADIOLOGY:    < from: Xray Chest 1 View- PORTABLE-Routine (Xray Chest 1 View- PORTABLE-Routine in AM.) (11.27.20 @ 06:29) >  Impression:    Bilateral opacifications indicative of worsening viral pneumonia.    < end of copied text >      PHYSICAL EXAM:    GENERAL: improving   CHEST/LUNG: coarse bs  HEART: IRIR; No murmurs, rubs, or gallops  ABDOMEN: ecchymosis  EXTREMITIES: no edema  NERVOUS SYSTEM:  Alert & Oriented X3,     Lines:  Central line:              Date placed:             Indication:   Intravenous Access:   NG tube:   De Catheter:   Indwelling Urethral Catheter:     Connect To:  Straight Drainage/[place]    Indication:  Urinary Retention / Obstruction (11-27-20 @ 08:20) (not performed) [Active]

## 2020-11-27 NOTE — CONSULT NOTE ADULT - SUBJECTIVE AND OBJECTIVE BOX
NEPHROLOGY CONSULTATION NOTE    THIS CONSULT IS INCOMPLETE / FULL CONSULT TO FOLLOW    Patient is a 75y Male whom presented to the hospital with 10 day history COVID dx, SOB and weakness, today is day 17. PMH of AAA s/p repair, HTN, HLD, CAD. Patient was seen at bedside, on Bipap and High flow O2, complaining of 10/10 back pain.     PAST MEDICAL & SURGICAL HISTORY:  Abdominal aortic aneurysm    Essential hypertension    High cholesterol    Myocardial infarction    S/P aneurysm repair      Allergies:  No Known Allergies    Home Medications Reviewed  Hospital Medications:   MEDICATIONS  (STANDING):  aspirin enteric coated 81 milliGRAM(s) Oral daily  atorvastatin 80 milliGRAM(s) Oral at bedtime  BACItracin   Ointment 1 Application(s) Topical four times a day  dexAMETHasone  Injectable 6 milliGRAM(s) IV Push daily  diltiazem    Tablet 30 milliGRAM(s) Oral four times a day  heparin  Infusion 900 Unit(s)/Hr (9 mL/Hr) IV Continuous <Continuous>  influenza   Vaccine 0.5 milliLiter(s) IntraMuscular once  lidocaine   Patch 1 Patch Transdermal daily  nafcillin  IVPB 2 Gram(s) IV Intermittent every 4 hours  pantoprazole    Tablet 40 milliGRAM(s) Oral before breakfast  QUEtiapine 50 milliGRAM(s) Oral at bedtime  QUEtiapine 25 milliGRAM(s) Oral at bedtime  sodium chloride 0.9%. 1000 milliLiter(s) (50 mL/Hr) IV Continuous <Continuous>      SOCIAL HISTORY:  Denies ETOH,Smoking,   FAMILY HISTORY:  No pertinent family history in first degree relatives          REVIEW OF SYSTEMS:  CONSTITUTIONAL: No fevers or chills  EYES/ENT: No visual changes or dizziness  NECK: No pain or stiffness  RESPIRATORY: No cough or wheezing   CARDIOVASCULAR: No chest pain or palpitations.  GASTROINTESTINAL: No abdominal pain or diarrhea   GENITOURINARY: No dysuria   VASCULAR: No bilateral lower extremity edema.   All other review of systems is negative unless indicated above.    VITALS:  T(F): 95.4 (11-27-20 @ 06:00), Max: 96.9 (11-26-20 @ 20:30)  HR: 83 (11-27-20 @ 10:00)  BP: 102/61 (11-27-20 @ 10:00)  RR: 20 (11-27-20 @ 10:00)  SpO2: 95% (11-27-20 @ 10:00)    11-26 @ 07:01 - 11-27 @ 07:00  --------------------------------------------------------  IN: 617.7 mL / OUT: 1100 mL / NET: -482.3 mL    11-27 @ 07:01 - 11-27 @ 10:40  --------------------------------------------------------  IN: 306 mL / OUT: 100 mL / NET: 206 mL            I&O's Detail    26 Nov 2020 07:01 - 27 Nov 2020 07:00  --------------------------------------------------------  IN:    Amiodarone: 183.7 mL    Heparin: 84 mL    sodium chloride 0.9%: 350 mL  Total IN: 617.7 mL    OUT:    Voided (mL): 1100 mL  Total OUT: 1100 mL    Total NET: -482.3 mL      27 Nov 2020 07:01 - 27 Nov 2020 10:40  --------------------------------------------------------  IN:    Heparin: 36 mL    Oral Fluid: 120 mL    sodium chloride 0.9%: 150 mL  Total IN: 306 mL    OUT:    Voided (mL): 100 mL  Total OUT: 100 mL    Total NET: 206 mL            PHYSICAL EXAM:  Constitutional: NAD  HEENT: anicteric sclera   Neck: Non tender   Respiratory: Bilateral breath sounds, no wheezing   Cardiovascular: Normal rate and rhythm   Gastrointestinal: BS+, soft, NT/ND  Extremities: No cyanosis or clubbing. trace LE edema   Neurological: A/O x 3   Psychiatric: Normal affect  :  No ponce.   Skin: No rashes, bilateral UE ecchymosis   Vascular Access:    LABS:  11-27    130<L>  |  92<L>  |  x   ----------------------------<  134<H>  5.2<H>   |  21  |  3.6<H>    Ca    7.5<L>      27 Nov 2020 08:05  Phos  6.8     11-27    TPro  4.6<L>  /  Alb  2.5<L>  /  TBili  1.6<H>  /  DBili      /  AST  28  /  ALT  6   /  AlkPhos  67  11-27    Creatinine Trend: 3.6 <--, 2.3 <--, 1.8 <--, 1.8 <--, 1.8 <--, 1.5 <--, 1.3 <--, 1.1 <--, 1.1 <--, 1.2 <--, 1.3 <--, 1.2 <--, 1.3 <--, 1.1 <--, 1.1 <--, 1.2 <--, 1.2 <--, 1.1 <--, 1.1 <--, 1.2 <--                        11.6   30.27 )-----------( 120      ( 27 Nov 2020 08:05 )             35.1     Urine Studies:          RADIOLOGY & ADDITIONAL STUDIES:    < from: Xray Chest 1 View- PORTABLE-Routine (Xray Chest 1 View- PORTABLE-Routine in AM.) (11.27.20 @ 06:29) >  EXAM:  XR CHEST PORTABLE ROUTINE 1V            PROCEDURE DATE:  11/27/2020            INTERPRETATION:  Clinical History / Reason for exam: Viral pneumonia.    Comparison : Chest radiograph prior day.    Technique/Positioning: Frontal portable, low lung volumes.    Findings:    Support devices: Telemetry leads overlie the thorax    Cardiac/mediastinum/hilum: Heart is enlarged    Lung parenchyma/Pleura: Bilateral opacities    Skeleton/soft tissues: Unchanged.    Impression:    Bilateral opacifications indicative of worsening viral pneumonia.        JOSHUA WALDEN MD; Attending Interventional Radiologist  This document has been electronically signed. Nov 27 2020  7:38AM    < end of copied text >               NEPHROLOGY CONSULTATION NOTE    THIS CONSULT IS INCOMPLETE / FULL CONSULT TO FOLLOW    Patient is a 75y Male whom presented to the hospital with 10 day history COVID dx, SOB and weakness, today is day 17. PMH of AAA s/p repair, HTN, HLD, CAD. Patient was seen at bedside, on Bipap, complaining of 10/10 back pain, was given morphine. Patient had no other complaints in ROS.     PAST MEDICAL & SURGICAL HISTORY:  Abdominal aortic aneurysm    Essential hypertension    High cholesterol    Myocardial infarction    S/P aneurysm repair      Allergies:  No Known Allergies    Home Medications Reviewed  Hospital Medications:   MEDICATIONS  (STANDING):  aspirin enteric coated 81 milliGRAM(s) Oral daily  atorvastatin 80 milliGRAM(s) Oral at bedtime  BACItracin   Ointment 1 Application(s) Topical four times a day  dexAMETHasone  Injectable 6 milliGRAM(s) IV Push daily  diltiazem    Tablet 30 milliGRAM(s) Oral four times a day  heparin  Infusion 900 Unit(s)/Hr (9 mL/Hr) IV Continuous <Continuous>  influenza   Vaccine 0.5 milliLiter(s) IntraMuscular once  lidocaine   Patch 1 Patch Transdermal daily  nafcillin  IVPB 2 Gram(s) IV Intermittent every 4 hours  pantoprazole    Tablet 40 milliGRAM(s) Oral before breakfast  QUEtiapine 50 milliGRAM(s) Oral at bedtime  QUEtiapine 25 milliGRAM(s) Oral at bedtime  sodium chloride 0.9%. 1000 milliLiter(s) (50 mL/Hr) IV Continuous <Continuous>      SOCIAL HISTORY:  Denies ETOH,Smoking,   FAMILY HISTORY:  No pertinent family history in first degree relatives          REVIEW OF SYSTEMS:  CONSTITUTIONAL: No fevers or chills  EYES/ENT: No visual changes or dizziness  NECK: No pain or stiffness  RESPIRATORY: No cough or wheezing   CARDIOVASCULAR: No chest pain or palpitations.  GASTROINTESTINAL: No abdominal pain or diarrhea   GENITOURINARY: No dysuria   VASCULAR: No bilateral lower extremity edema.   All other review of systems is negative unless indicated above.    VITALS:  T(F): 95.4 (11-27-20 @ 06:00), Max: 96.9 (11-26-20 @ 20:30)  HR: 83 (11-27-20 @ 10:00)  BP: 102/61 (11-27-20 @ 10:00)  RR: 20 (11-27-20 @ 10:00)  SpO2: 95% (11-27-20 @ 10:00)    11-26 @ 07:01 - 11-27 @ 07:00  --------------------------------------------------------  IN: 617.7 mL / OUT: 1100 mL / NET: -482.3 mL    11-27 @ 07:01 - 11-27 @ 10:40  --------------------------------------------------------  IN: 306 mL / OUT: 100 mL / NET: 206 mL            I&O's Detail    26 Nov 2020 07:01 - 27 Nov 2020 07:00  --------------------------------------------------------  IN:    Amiodarone: 183.7 mL    Heparin: 84 mL    sodium chloride 0.9%: 350 mL  Total IN: 617.7 mL    OUT:    Voided (mL): 1100 mL  Total OUT: 1100 mL    Total NET: -482.3 mL      27 Nov 2020 07:01  -  27 Nov 2020 10:40  --------------------------------------------------------  IN:    Heparin: 36 mL    Oral Fluid: 120 mL    sodium chloride 0.9%: 150 mL  Total IN: 306 mL    OUT:    Voided (mL): 100 mL  Total OUT: 100 mL    Total NET: 206 mL            PHYSICAL EXAM:  Constitutional: NAD  HEENT: anicteric sclera   Neck: Non tender   Respiratory: Bilateral breath sounds, no wheezing   Cardiovascular: Normal rate and rhythm   Gastrointestinal: BS+, soft, NT/ND  Extremities: No cyanosis or clubbing. trace LE edema   Neurological: A/O x 3   Psychiatric: Normal affect  :  No ponce.   Skin: No rashes, bilateral UE ecchymosis   Vascular Access:    LABS:  11-27    130<L>  |  92<L>  |  x   ----------------------------<  134<H>  5.2<H>   |  21  |  3.6<H>    Ca    7.5<L>      27 Nov 2020 08:05  Phos  6.8     11-27    TPro  4.6<L>  /  Alb  2.5<L>  /  TBili  1.6<H>  /  DBili      /  AST  28  /  ALT  6   /  AlkPhos  67  11-27    Creatinine Trend: 3.6 <--, 2.3 <--, 1.8 <--, 1.8 <--, 1.8 <--, 1.5 <--, 1.3 <--, 1.1 <--, 1.1 <--, 1.2 <--, 1.3 <--, 1.2 <--, 1.3 <--, 1.1 <--, 1.1 <--, 1.2 <--, 1.2 <--, 1.1 <--, 1.1 <--, 1.2 <--                        11.6   30.27 )-----------( 120      ( 27 Nov 2020 08:05 )             35.1     Urine Studies:          RADIOLOGY & ADDITIONAL STUDIES:    < from: Xray Chest 1 View- PORTABLE-Routine (Xray Chest 1 View- PORTABLE-Routine in AM.) (11.27.20 @ 06:29) >  EXAM:  XR CHEST PORTABLE ROUTINE 1V            PROCEDURE DATE:  11/27/2020            INTERPRETATION:  Clinical History / Reason for exam: Viral pneumonia.    Comparison : Chest radiograph prior day.    Technique/Positioning: Frontal portable, low lung volumes.    Findings:    Support devices: Telemetry leads overlie the thorax    Cardiac/mediastinum/hilum: Heart is enlarged    Lung parenchyma/Pleura: Bilateral opacities    Skeleton/soft tissues: Unchanged.    Impression:    Bilateral opacifications indicative of worsening viral pneumonia.        JOSHUA WALDEN MD; Attending Interventional Radiologist  This document has been electronically signed. Nov 27 2020  7:38AM    < end of copied text >

## 2020-11-27 NOTE — PROGRESS NOTE ADULT - ASSESSMENT
74 YO M with PMH of AAA s/p repair, HTN, HLD, CAD presented to [facility] ED for SOB and severe weakness. He was tested positive COVID-19 about 10 days ago then progressively became weak, SOB and hypoxic. He was transferred to [facility] for COVID19 PNA treatment. Cardiology was initially consulted for V-tach episodes.    Impression:    Acute hypoxemic Respiratory failure due to COVID PNA on BiPAP / HiFlo (FiO2 100%)  Sepsis, MSSA bacteremia, SVITLANA  H/O CAD and prior MI  HFmrEF (EF ~40%)  NSVT on the monitor  A.Fib, new onset, [id number] of ~5, on heparin gtt   EKG changes (old inferior Q waves with <1mm St elevations in inferior leads), with no active chest pain and active infection    Recommend:  - Check repeat EKG, and serial cardiac troponins  - Would recommend beta-blockers for underlying CAD and A.Fib instead of cardizem  - Continue with ASA 81mg, Lipitor 80mg QHS, heparin gttn  - Monitor oxygenation  - Continue with supportive care for COVID-19 PNA, and treatment of underlying bacteremia as per Pulm/ID   74 YO M with PMH of AAA s/p repair, HTN, HLD, CAD presented to [facility] ED for SOB and severe weakness. He was tested positive COVID-19 about 10 days ago then progressively became weak, SOB and hypoxic. He was transferred to [facility] for COVID19 PNA treatment. Cardiology was initially consulted for V-tach episodes.    Impression:    Acute hypoxemic Respiratory failure due to COVID PNA on BiPAP / HiFlo (FiO2 100%)  Sepsis, MSSA bacteremia, SVITLANA  H/O CAD and prior MI  HFmrEF (EF ~40%)  NSVT on the monitor  A.Fib, new onset, [id number] of ~5, on heparin gtt   EKG changes (old inferior Q waves with <1mm St elevations in inferior leads), with no active chest pain and active infection    Recommend:  - Check repeat EKG, and serial cardiac troponins  - Would recommend beta-blockers for underlying CAD and A.Fib instead of cardizem  - Continue with ASA 81mg, Lipitor 80mg QHS, heparin gttn  - Monitor oxygenation  - Continue with supportive care for COVID-19 PNA, and treatment of underlying bacteremia as per Pulm/ID  - Nephrology follow-up.

## 2020-11-27 NOTE — CONSULT NOTE ADULT - ASSESSMENT
Patient is a 75y Male whom presented to the hospital with 10 day history COVID dx, SOB and weakness, today is day 17. PMH of AAA s/p repair, HTN, HLD, CAD. Nephrology has been consulted for SVITLANA, elevated creatine and worsening Kidney function        SVITLANA   - Creatine 2.3 increased from 1.8 in the last 24 hours BL 1.1   - BUN - 129 steady increase from 21 on admission - Could be due to steroids   - K+ - 4.9   - GFR - 26   - Lisinopril on hold - last administered 11/21   - Bacteremic since 11/22 on Nafcillin 2gQ4h    - Order UA, FeNA   - Monitor BMP / Blood pressure / Urine output   - Continue NS 50 cc/hr   - Will reassess pending labs   - Renal Replacement Therapy not needed at this time Patient is a 75y Male whom presented to the hospital with 10 day history COVID dx, SOB and weakness, today is day 17. PMH of AAA s/p repair, HTN, HLD, CAD. Nephrology has been consulted for SVITLANA, elevated creatine and worsening Kidney function        SVITLANA   - Creatine 2.3 increased from 1.8 in the last 24 hours BL 1.1   - BUN - 129 steady increase from 21 on admission - Could be due to steroids   - K+ - 4.9   - GFR - 26   - Lisinopril on hold - last administered 11/21   - MSSA Bacteremia since 11/22 started on 11/25 Nafcillin 2gQ4h    - Order UA, FeNA   - Monitor BMP / Blood pressure / Urine output   - Continue NS 50 cc/hr   - Will reassess pending labs   - Renal Replacement Therapy not needed at this time Patient is a 75y Male whom presented to the hospital with 10 day history COVID dx, SOB and weakness, today is day 17. PMH of AAA s/p repair, HTN, HLD, CAD. Nephrology has been consulted for SVITLANA, elevated creatine and worsening Kidney function        SVITLANA   - Creatine 2.3 increased from 1.8 in the last 24 hours BL 1.1   - BUN - 129 steady increase from 21 on admission - Could be due to steroids   - K+ - 4.9   - GFR - 26   - Lisinopril on hold - last administered 11/21   - MSSA Bacteremia since 11/22 started on 11/25 Nafcillin 2gQ4h    - Order UA, FeNA?  - Bladder and Kidney US? - r/o obstruction   - Monitor BMP / Blood pressure / Urine output   - Continue NS 50 cc/hr   - Will reassess pending labs   - Renal Replacement Therapy not needed at this time Patient is a 75y Male whom presented to the hospital with 10 day history COVID dx, SOB and weakness, today is day 17. PMH of AAA s/p repair, HTN, HLD, CAD. Nephrology has been consulted for SVITLANA, elevated creatine and worsening Kidney function        SVITLANA   - Creatine 2.3 increased from 1.8 in the last 24 hours BL 1.1   - BUN - 129 steady increase from 21 on admission - Could be due to steroids   - K+ - 4.9   - GFR - 26   - Lisinopril on hold - last administered 11/21   - MSSA Bacteremia since 11/22 started on 11/25 Nafcillin 2gQ4h    - Order UA, FeNA  - Bladder and Kidney US - r/o obstruction   - Monitor BMP / Blood pressure / Urine output   - Continue NS 50 cc/hr   - Will reassess pending labs   - Renal Replacement Therapy not needed at this time

## 2020-11-27 NOTE — PROGRESS NOTE ADULT - ASSESSMENT
76 yo male, pmh of AAA s/p repair, htn, hld, cad, presents to ed for sob and severe weakness, Pt was dx with covid-19  10 days ago, today felt more weak and sob, ems found the pt to be hypoxic    #acute hypoxic respiratory failure 2/2 SARS covid-19 pneumonia  #Bacteremia- blood culture grew staph aureus 11/22  #septic shock - low BP, hypothermia, tachycardic  - s/p remdesivir x 5 days, toci x 2 doses  - on HFNC 50/100, currently saturating 85-92% and intermittent Bipap.   - CXR- stable  - wbc >29.25->24.82->30.23->26.21>30.27 11/26  - crp- 7.37->10.50-> 5.8 11/25  - ferritin - 1847 11/23  - procal -.84 -> .75 11/25  - d-dimer -589 11/25  - fungitell - <31  - continue nafcillin 2g q4 (start 11/25)  - cont dexamethasone 6 mg daily  - cont hep gtt  - FU ID recs   - FU daily blood cx till NGT  - fu inflammatory markers   - FU echo- r/o endocarditis.   - daily cxr and abg     #  A fib rate uncontrolled- new onset  # EKG abnormalities on inferior lead  - ecg - afib with RVR twave abnormality   - troponin <.01  - continue hep gtt  - off amio  - can start on CCB if BP permits   - Fu Cardiac Enzymes  - FU cardio    # SVITLANA, suspected ATN- worsening  - creatinine up 2.3  - continue on NS 50 cc/hr  - held lisinopril  - fu nephro     # GI PPx - Protonix   # DVT PPx - hep gtt   # Activity -  Increase as Tolerated    # Dispo -   Patient to be discharged when medically optimized.  # Code Status - FULL

## 2020-11-27 NOTE — PROGRESS NOTE ADULT - SUBJECTIVE AND OBJECTIVE BOX
OVERNIGHT EVENTS: events noted, ID reviewed, worsening renal function    Vital Signs Last 24 Hrs  T(C): 35.2 (27 Nov 2020 06:00), Max: 36.1 (26 Nov 2020 20:30)  T(F): 95.4 (27 Nov 2020 06:00), Max: 96.9 (26 Nov 2020 20:30)  HR: 82 (27 Nov 2020 06:00) (72 - 82)  BP: 119/73 (27 Nov 2020 06:00) (113/62 - 133/74)  RR: 20 (27 Nov 2020 06:00) (20 - 24)  SpO2: 96% (27 Nov 2020 06:00) (91% - 98%)    PHYSICAL EXAMINATION:    GENERAL: ill looking    HEENT: Head is normocephalic and atraumatic.    NECK: Supple.    LUNGS: bibasilar crackles    HEART: TAMMIE 3/6    ABDOMEN: Soft, nontender, and nondistended.      EXTREMITIES: Without any cyanosis, clubbing, rash, lesions or edema.    NEUROLOGIC: Grossly intact.    SKIN: No ulceration or induration present.      LABS:                        12.0   26.21 )-----------( 113      ( 26 Nov 2020 06:55 )             36.1     11-26    132<L>  |  94<L>  |  129<HH>  ----------------------------<  118<H>  4.9   |  22  |  2.3<H>    Ca    7.5<L>      26 Nov 2020 06:55    TPro  4.6<L>  /  Alb  2.5<L>  /  TBili  1.4<H>  /  DBili  x   /  AST  24  /  ALT  10  /  AlkPhos  70  11-26    PTT - ( 26 Nov 2020 11:12 )  PTT:28.9 sec                Procalcitonin, Serum: 0.75 ng/mL (11-25-20 @ 05:27)        11-25-20 @ 07:01  -  11-26-20 @ 07:00  --------------------------------------------------------  IN: 16.7 mL / OUT: 150 mL / NET: -133.3 mL    11-26-20 @ 07:01  -  11-27-20 @ 06:19  --------------------------------------------------------  IN: 617.7 mL / OUT: 1100 mL / NET: -482.3 mL        MICROBIOLOGY:      MEDICATIONS  (STANDING):  aMIOdarone Infusion 0.999 mG/Min (33.3 mL/Hr) IV Continuous <Continuous>  aMIOdarone Infusion 0.5 mG/Min (16.7 mL/Hr) IV Continuous <Continuous>  aspirin enteric coated 81 milliGRAM(s) Oral daily  atorvastatin 80 milliGRAM(s) Oral at bedtime  BACItracin   Ointment 1 Application(s) Topical four times a day  dexAMETHasone  Injectable 6 milliGRAM(s) IV Push daily  heparin  Infusion 1200 Unit(s)/Hr (12 mL/Hr) IV Continuous <Continuous>  influenza   Vaccine 0.5 milliLiter(s) IntraMuscular once  lidocaine   Patch 1 Patch Transdermal daily  nafcillin  IVPB 2 Gram(s) IV Intermittent every 4 hours  pantoprazole    Tablet 40 milliGRAM(s) Oral before breakfast  QUEtiapine 50 milliGRAM(s) Oral at bedtime  QUEtiapine 25 milliGRAM(s) Oral at bedtime  sodium chloride 0.9%. 1000 milliLiter(s) (50 mL/Hr) IV Continuous <Continuous>    MEDICATIONS  (PRN):  acetaminophen   Tablet .. 650 milliGRAM(s) Oral every 4 hours PRN Mild Pain (1 - 3)  morphine  - Injectable 1 milliGRAM(s) IV Push every 4 hours PRN Severe Pain (7 - 10)  oxycodone    5 mG/acetaminophen 325 mG 1 Tablet(s) Oral every 4 hours PRN Moderate Pain (4 - 6)  zolpidem 5 milliGRAM(s) Oral at bedtime PRN Insomnia      RADIOLOGY & ADDITIONAL STUDIES:         OVERNIGHT EVENTS: events noted, ID reviewed, worsening renal function, still on HHFNC 100%, EKG changes    Vital Signs Last 24 Hrs  T(C): 35.2 (27 Nov 2020 06:00), Max: 36.1 (26 Nov 2020 20:30)  T(F): 95.4 (27 Nov 2020 06:00), Max: 96.9 (26 Nov 2020 20:30)  HR: 82 (27 Nov 2020 06:00) (72 - 82)  BP: 119/73 (27 Nov 2020 06:00) (113/62 - 133/74)  RR: 20 (27 Nov 2020 06:00) (20 - 24)  SpO2: 96% (27 Nov 2020 06:00) (91% - 98%)    PHYSICAL EXAMINATION:    GENERAL: ill looking    HEENT: Head is normocephalic and atraumatic.    NECK: Supple.    LUNGS: bibasilar crackles    HEART: TAMMIE 3/6    ABDOMEN: Soft, nontender, and nondistended.      EXTREMITIES: Without any cyanosis, clubbing, rash, lesions or edema.    NEUROLOGIC: Grossly intact.    SKIN: No ulceration or induration present.      LABS:                        12.0   26.21 )-----------( 113      ( 26 Nov 2020 06:55 )             36.1     11-26    132<L>  |  94<L>  |  129<HH>  ----------------------------<  118<H>  4.9   |  22  |  2.3<H>    Ca    7.5<L>      26 Nov 2020 06:55    TPro  4.6<L>  /  Alb  2.5<L>  /  TBili  1.4<H>  /  DBili  x   /  AST  24  /  ALT  10  /  AlkPhos  70  11-26    PTT - ( 26 Nov 2020 11:12 )  PTT:28.9 sec                Procalcitonin, Serum: 0.75 ng/mL (11-25-20 @ 05:27)        11-25-20 @ 07:01  -  11-26-20 @ 07:00  --------------------------------------------------------  IN: 16.7 mL / OUT: 150 mL / NET: -133.3 mL    11-26-20 @ 07:01  -  11-27-20 @ 06:19  --------------------------------------------------------  IN: 617.7 mL / OUT: 1100 mL / NET: -482.3 mL        MICROBIOLOGY:      MEDICATIONS  (STANDING):  aMIOdarone Infusion 0.999 mG/Min (33.3 mL/Hr) IV Continuous <Continuous>  aMIOdarone Infusion 0.5 mG/Min (16.7 mL/Hr) IV Continuous <Continuous>  aspirin enteric coated 81 milliGRAM(s) Oral daily  atorvastatin 80 milliGRAM(s) Oral at bedtime  BACItracin   Ointment 1 Application(s) Topical four times a day  dexAMETHasone  Injectable 6 milliGRAM(s) IV Push daily  heparin  Infusion 1200 Unit(s)/Hr (12 mL/Hr) IV Continuous <Continuous>  influenza   Vaccine 0.5 milliLiter(s) IntraMuscular once  lidocaine   Patch 1 Patch Transdermal daily  nafcillin  IVPB 2 Gram(s) IV Intermittent every 4 hours  pantoprazole    Tablet 40 milliGRAM(s) Oral before breakfast  QUEtiapine 50 milliGRAM(s) Oral at bedtime  QUEtiapine 25 milliGRAM(s) Oral at bedtime  sodium chloride 0.9%. 1000 milliLiter(s) (50 mL/Hr) IV Continuous <Continuous>    MEDICATIONS  (PRN):  acetaminophen   Tablet .. 650 milliGRAM(s) Oral every 4 hours PRN Mild Pain (1 - 3)  morphine  - Injectable 1 milliGRAM(s) IV Push every 4 hours PRN Severe Pain (7 - 10)  oxycodone    5 mG/acetaminophen 325 mG 1 Tablet(s) Oral every 4 hours PRN Moderate Pain (4 - 6)  zolpidem 5 milliGRAM(s) Oral at bedtime PRN Insomnia      RADIOLOGY & ADDITIONAL STUDIES:

## 2020-11-27 NOTE — PROGRESS NOTE ADULT - CONVERSATION DETAILS
family Bonny (daugther) ---[phone]  updated on status- patient is still full code at the moment. Patient family is aware that the patient is require high levels of O2 compounded with heart and kidney complications and bacteremia. Was asked if patient can receive these services at home and explained to them that the patient is on constant observation in the CEU and any changes in his status is addressed in a timely matters, something that cannot be done at home. Family made aware of his critical status and informed that at any point can take turn for the worse.
Patient was updated on his overall clinical status. he remains in favor of full life prolonging therapy without restrictions.

## 2020-11-27 NOTE — PROGRESS NOTE ADULT - ATTENDING COMMENTS
I have personally seen and examined this patient.  I have fully participated in the care of this patient.  I have reviewed pertinent clinical information, including history, physical exam, plan and note.   I have reviewed relevant imaging and diagnostic studies personally. I agree with resident note above and plan of care, edited and corrected where applicable.     .. Persistent Acute Hypoxic Respiratory Failure secondary to COVID19 pneumonia, on high FiO2 via BiPAP/High Flow Nasal Cannula (HFNC)    .. Severe COVID19 infection with evidence of underlying inflammatory cytokine storm  .. AFib on intravenous heparin drip according to PTT; better rate control after Amiodarone IV  .. SVITLANA, suspected ATN    PLAN  .. Agree with cardiology re: preference of Beta-Blocker therapy in presence of underlying CAD  .. O2 support, currently in need for Non-invasive positive pressure ventilation (NIPPV)  .. Once OFF BiPAP and reliable oral intake: Consider switching to coumadin   .. Medical team frequently updating family members/ refer to most recent Goals of Care Conversation documentation    Case discussed at length with multidisciplinary team on rounds.   At high risk for cardiovascular events and respiratory complications despite all care.

## 2020-11-27 NOTE — PROGRESS NOTE ADULT - ASSESSMENT
IMPRESSION:    Acute hypoxic respiratory failure still on high oxygen  COVID 19 pneumonia   HFREF  Possible superimposed infection   Worsening renal function  Blood cx /staph aureus bacteremia    SUGGEST:      CNS: Avoid CNS depressant.  Seroquel 50 mg     HEENT: Oral care    PULMONARY:  HOB @ 45 degrees.  HHFNC alternate with BIPAP, keep Sao2 92 to 96%, Incentive spirometry    CARDIOVASCULAR:  start  NS 50cc/ hr avoid overload    GI: GI prophylaxis.  Feeding as tolerated     RENAL:  Follow up lytes.  Correct as needed, repeat CMP  renal  consult , accurate I/ O    INFECTIOUS DISEASE:  per ID. repeat CX on nafcillin    HEMATOLOGICAL:  AC , IV heparin    ENDOCRINE:  Follow up FS.  Insulin protocol if needed.  CEU  poor prognosis IMPRESSION:    Acute hypoxic respiratory failure still on high oxygen 100%  COVID 19 pneumonia   HFREF  Possible superimposed infection   Worsening renal function  Blood cx /staph aureus bacteremia    SUGGEST:      CNS: Avoid CNS depressant.  Seroquel 50 mg     HEENT: Oral care    PULMONARY:  HOB @ 45 degrees.  HHFNC alternate with BIPAP, keep Sao2 92 to 96%, Incentive spirometry    CARDIOVASCULAR:  EKG, CE, keep equal balance    GI: GI prophylaxis.  Feeding as tolerated     RENAL:  Follow up lytes.  Correct as needed, repeat CMP  renal  consult , accurate I/ O    INFECTIOUS DISEASE:  per ID. repeat CX on nafcillin    HEMATOLOGICAL:  AC , IV heparin    ENDOCRINE:  Follow up FS.  Insulin protocol if needed.  CEU  poor prognosis  palliative care consult

## 2020-11-27 NOTE — CONSULT NOTE ADULT - ATTENDING COMMENTS
Attending Statement: I have personally performed a face to face diagnostic evaluation on this patient and have arrived at the suggestions for care. I have written all aspects of the above note.
Pt seen and examined  SVITLANA in setting of COVid  markedly elvavted BUN ? vol depleted vs steroids      cont NS for now, is not vol overloaded, though need to monitor as EF 40%  UA lytes, Renal US  as above  further workup pending out come of above

## 2020-11-28 NOTE — CHART NOTE - NSCHARTNOTEFT_GEN_A_CORE
Registered Dietitian Follow-Up     Patient Profile Reviewed                           Yes [x]   No []     Nutrition History Previously Obtained        Yes []  No [x]       Pertinent Subjective Information: INCOMPLETE NOTE  As per RN due to respiratory status pt has not eaten yesterday and today.      Pertinent Medical Interventions:     Diet order:     Anthropometrics:    IBW:     Daily   % Weight Change    MEDICATIONS  (STANDING):  aspirin enteric coated 81 milliGRAM(s) Oral daily  atorvastatin 80 milliGRAM(s) Oral at bedtime  BACItracin   Ointment 1 Application(s) Topical four times a day  dexAMETHasone  Injectable 6 milliGRAM(s) IV Push daily  heparin  Infusion 900 Unit(s)/Hr (9 mL/Hr) IV Continuous <Continuous>  influenza   Vaccine 0.5 milliLiter(s) IntraMuscular once  lidocaine   Patch 1 Patch Transdermal daily  metoprolol tartrate Injectable 5 milliGRAM(s) IV Push every 8 hours  nafcillin  IVPB 2 Gram(s) IV Intermittent every 4 hours  norepinephrine Infusion 0.05 MICROgram(s)/kG/Min (8.3 mL/Hr) IV Continuous <Continuous>  pantoprazole    Tablet 40 milliGRAM(s) Oral before breakfast  QUEtiapine 50 milliGRAM(s) Oral at bedtime  QUEtiapine 25 milliGRAM(s) Oral at bedtime  sevelamer carbonate 800 milliGRAM(s) Oral three times a day with meals  sodium zirconium cyclosilicate 10 Gram(s) Oral two times a day    MEDICATIONS  (PRN):  acetaminophen   Tablet .. 650 milliGRAM(s) Oral every 4 hours PRN Mild Pain (1 - 3)  LORazepam   Injectable 0.5 milliGRAM(s) IV Push every 4 hours PRN Anxiety  morphine  - Injectable 2 milliGRAM(s) IV Push every 4 hours PRN Severe Pain (7 - 10)  oxycodone    5 mG/acetaminophen 325 mG 1 Tablet(s) Oral every 4 hours PRN Moderate Pain (4 - 6)  zolpidem 5 milliGRAM(s) Oral at bedtime PRN Insomnia    Pertinent Labs: 11-28 @ 16:40: Na 126<L>, <HH>, Cr 5.0<HH>, <H>, K+ 6.1<HH>, Phos --, Mg --, Alk Phos --, ALT/SGPT --, AST/SGOT --, HbA1c --  11-28 @ 14:20: Na --, BUN --, Cr --, BG --, K+ --, Phos 8.6<H>, Mg 3.3<HH>, Alk Phos --, ALT/SGPT --, AST/SGOT --, HbA1c --  11-28 @ 06:21: Na 130<L>, <HH>, Cr 4.7<HH>, BG 97, K+ 6.3<HH>, Phos --, Mg --, Alk Phos 65, ALT/SGPT 6, AST/SGOT 29, HbA1c --    Finger Sticks:    Physical Findings:  - Appearance:  - GI function:  - Tubes:  - Oral/Mouth cavity:  - Skin:     Nutrition Requirements:  Weight Used:     Estimated Energy Needs    Continue []  Adjust []  Adjusted Energy Recommendations:   kcal/day        Estimated Protein Needs    Continue []  Adjust []  Adjusted Protein Recommendations:   gm/day        Estimated Fluid Needs        Continue []  Adjust []  Adjusted Fluid Recommendations:   mL/day     Nutrient Intake:     [] Previous Nutrition Diagnosis:            [] Ongoing          [] Resolved    [] No active nutrition diagnosis identified at this time     Nutrition Intervention      Goal/Expected Outcome:      Indicator/Monitoring:      Recommendation: Registered Dietitian Follow-Up     Patient Profile Reviewed                           Yes [x]   No []     Nutrition History Previously Obtained        Yes []  No [x]  - pt on BIPAP, lethargic. Family not available at this time.      Pertinent Subjective Information: As per RN pt unable to eat due to worsening respiratory status.       Pertinent Medical Interventions: Persistent Acute Hypoxic Respiratory Failure secondary to COVID19 pneumonia. Worsening respiratory status, pt has been on BIPAP. As per MD may require intubation. Worsening kidney function, as per Nephrology SVITLANA most likely ATN, s/p HD today.  ? Palliative Care evaluation.      Diet order: (11/25) DASH/TLC + Ensure Enlive q 12hrs    Anthropometrics:  Ht: 177.8cm  Wt: no new weights, last 88.kg (11/10)  Wt change:  BMI: 28.0  IBW: 75.5kg      MEDICATIONS  (STANDING):  aspirin enteric coated 81 milliGRAM(s) Oral daily  atorvastatin 80 milliGRAM(s) Oral at bedtime  BACItracin   Ointment 1 Application(s) Topical four times a day  dexAMETHasone  Injectable 6 milliGRAM(s) IV Push daily  heparin  Infusion 900 Unit(s)/Hr (9 mL/Hr) IV Continuous <Continuous>  influenza   Vaccine 0.5 milliLiter(s) IntraMuscular once  lidocaine   Patch 1 Patch Transdermal daily  metoprolol tartrate Injectable 5 milliGRAM(s) IV Push every 8 hours  nafcillin  IVPB 2 Gram(s) IV Intermittent every 4 hours  norepinephrine Infusion 0.05 MICROgram(s)/kG/Min (8.3 mL/Hr) IV Continuous <Continuous>  pantoprazole    Tablet 40 milliGRAM(s) Oral before breakfast  QUEtiapine 50 milliGRAM(s) Oral at bedtime  QUEtiapine 25 milliGRAM(s) Oral at bedtime  sevelamer carbonate 800 milliGRAM(s) Oral three times a day with meals  sodium zirconium cyclosilicate 10 Gram(s) Oral two times a day    MEDICATIONS  (PRN):  acetaminophen   Tablet .. 650 milliGRAM(s) Oral every 4 hours PRN Mild Pain (1 - 3)  LORazepam   Injectable 0.5 milliGRAM(s) IV Push every 4 hours PRN Anxiety  morphine  - Injectable 2 milliGRAM(s) IV Push every 4 hours PRN Severe Pain (7 - 10)  oxycodone    5 mG/acetaminophen 325 mG 1 Tablet(s) Oral every 4 hours PRN Moderate Pain (4 - 6)  zolpidem 5 milliGRAM(s) Oral at bedtime PRN Insomnia    Pertinent Labs: 11/28 WBC- 24.00, Lactate 1.7, Na 126, K- 6.1, Cl- 90, BUN- 166, Cr- 5.0, GLu 100, Mg- 3.3, Phos- 8.6, CRP- 12.29,     Physical Findings:  - Appearance: on BIPAP, lethargic , uncomfortable, hypotensive, on levophed  - GI function: no BMs today, ? last BM documented 11/17 (?lack of documentation)  - Tubes: none  - Oral/Mouth cavity:  - Skin: ecchymosis     Nutrition Requirements:  Weight Used: act wt 88.5kg and IBW- 75.5kg       Estimated Energy Needs    Continue [v]  Adjust [] [id number] kcal/day (MSJ x1.2-1.3, aim toward lower end)  Adjusted Energy Recommendations:   kcal/day        Estimated Protein Needs    Continue []  Adjust [x] 90 - 106gm/day (1.2-1.4 gm/kg) - now on HD   Adjusted Protein Recommendations:   gm/day        Estimated Fluid Needs        Continue [x]  Adjust [] per CEU/Nephrology team  Adjusted Fluid Recommendations:   mL/day     Nutrient Intake: not meeting needs; pt did not eat today 2/2 continuos BIPAP, had 30% breakfast yesterday. Poor PO intake x  4 days.      [] Previous Nutrition Diagnosis: Inadequate oral intake             [x] Ongoing          [] Resolved    [] No active nutrition diagnosis identified at this time     Nutrition Intervention: meals and snacks, medical food supplements     Goal/Expected Outcome: PO intake >50% of meals, snacks, and supplements within 3 days (if able to consume PO diet)      Indicator/Monitoring: RD to monitor diet order, energy intake, renal/glucose profiles, NFPF.    Recommendations:  If able to wean off BIPAP, please change diet to Renal ( for patients receiving RRT, low Na/K/Phos), add Ensure Clear q 12hrs and Prosource Gelatein Plus q 24hrs (Nepro might not be appropriate 2/2 high n6:n3 ratio). If unable to feed pt witin 24-72hrs consider NST eval. If pt to remain on BIPAP, he  may need small bore tube placement and start of post pyloric EN. Consider bowel regimen ( ? last BM 11/17). Will follow.

## 2020-11-28 NOTE — PROGRESS NOTE ADULT - ASSESSMENT
·	Persistent Acute Hypoxic Respiratory Failure secondary to COVID19 pneumonia   ·	Severe COVID19 infection with evidence of underlying inflammatory cytokine storm   ·	CAD and Chronic stable Heart Failure with Reduced Ejection Fraction ; s/p cardiology evaluation  ·	Bacteremia, MSSA on intravenous nafcillin  ·	Worsening acute oliguric SVITLANA; high risk for ATN   ·	New onset AFib, on better rate and rhythm control currently. On intravenous heparin for elevated risk of CVA.   ·	Obesity    PLAN  ·	Switch Calcium Channel Blocker with Beta-Blocker therapy (underlying CAD and HF)  ·	Will follow up with ID re: nafcillin at this time   ·	Renal evaluation/ Will evaluate for possible obstructive uropathy  ·	intravenous heparin according to PTT   ·	Palliative Care evaluation; frequent communication with family members (daughter)     Patient remains with poor prognosis overall despite all care.   Case discussed with senior resident assigned.        ·	Persistent Acute Hypoxic Respiratory Failure secondary to COVID19 pneumonia   ·	Severe COVID19 infection with evidence of underlying inflammatory cytokine storm   ·	CAD and Chronic stable Heart Failure with Reduced Ejection Fraction ; s/p cardiology evaluation  ·	Bacteremia, MSSA on intravenous nafcillin  ·	Worsening acute oliguric SVITLANA; high risk for ATN   ·	New onset AFib, on better rate and rhythm control currently. On intravenous heparin for elevated risk of CVA.   ·	Obesity    PLAN  ·	Switch Calcium Channel Blocker with Beta-Blocker therapy (underlying CAD and HF)  ·	I discussed with ID team today re: nafcillin. No need to DC or adjust dose at this time; will follow up UA (ordered)  ·	Renal evaluation ongoing/ Reported urinary retention and De placement  ·	intravenous heparin according to PTT   ·	Palliative Care evaluation; frequent communication with family members (daughter)     Patient remains with poor prognosis overall despite all care.   Case discussed with senior resident assigned.        ·	Persistent Acute Hypoxic Respiratory Failure secondary to COVID19 pneumonia   ·	Severe COVID19 infection with evidence of underlying inflammatory cytokine storm   ·	CAD and Chronic stable Heart Failure with Reduced Ejection Fraction ; s/p cardiology evaluation  ·	Bacteremia, MSSA on intravenous nafcillin  ·	Worsening acute oliguric SVITLANA; high risk for ATN   ·	New onset AFib, on better rate and rhythm control currently. On intravenous heparin for elevated risk of CVA.   ·	Obesity    PLAN  ·	Switch Calcium Channel Blocker with Beta-Blocker therapy (underlying CAD and HF)  ·	I discussed with ID team today re: nafcillin. No need to DC or adjust dose at this time; will follow up UA (ordered)  ·	Renal evaluation ongoing/ Reported urinary retention and De placement/ I called radiology re: reading the kidney+ bladder US performed yesterday.   ·	intravenous heparin according to PTT   ·	Palliative Care evaluation; frequent communication with family members (daughter) patient remains with life-prolonging therapy at this point.     Patient remains with poor prognosis overall despite all care.   Case discussed with senior resident assigned.        ·	Persistent Acute Hypoxic Respiratory Failure secondary to COVID19 pneumonia   ·	Severe COVID19 infection with evidence of underlying inflammatory cytokine storm   ·	CAD and Chronic stable Heart Failure with Reduced Ejection Fraction ; s/p cardiology evaluation  ·	Bacteremia, MSSA on intravenous nafcillin  ·	Worsening acute oliguric SVITLANA; high risk for ATN.   ·	New onset AFib, on better rate and rhythm control currently. On intravenous heparin for elevated risk of CVA.   ·	Obesity    PLAN  ·	Switch Calcium Channel Blocker with Beta-Blocker therapy (underlying CAD and HF)  ·	I discussed with ID team today re: nafcillin. No need to DC or adjust dose at this time; will follow up UA (ordered)  ·	Renal evaluation ongoing/ Reported urinary retention and De placement/ I called radiology re: reading the kidney+ bladder US performed yesterday.   ·	intravenous heparin according to PTT   ·	Will consider Palliative Care evaluation if patient continues to worsen; frequent communication with family members (daughter) patient remains with life-prolonging therapy at this point.     Patient remains with poor prognosis overall despite all care.   Case discussed with senior resident assigned.        ·	Persistent Acute Hypoxic Respiratory Failure secondary to COVID19 pneumonia   ·	Severe COVID19 infection with evidence of underlying inflammatory cytokine storm   ·	CAD and Chronic stable Heart Failure with Reduced Ejection Fraction ; s/p cardiology evaluation  ·	Bacteremia, MSSA on intravenous nafcillin  ·	Worsening acute oliguric SVITLANA; high risk for ATN.   ·	New onset AFib, on better rate and rhythm control currently. On intravenous heparin for elevated risk of CVA.   ·	Obesity    PLAN  ·	Switch Calcium Channel Blocker with Beta-Blocker therapy (underlying CAD and HF)  ·	I discussed with ID team today re: nafcillin. No need to DC or adjust dose at this time; will follow up UA (ordered)  ·	Renal evaluation ongoing/ NO reported urinary retention as per straight cath performed by nursing staff yesterday and as per bladder scan performed at bedside/ I called radiology re: reading the kidney+ bladder US performed yesterday.   ·	intravenous heparin according to PTT   ·	Will consider Palliative Care evaluation if patient continues to worsen; frequent communication with family members (daughter) patient remains with life-prolonging therapy at this point.     Patient remains with poor prognosis overall despite all care.   Case discussed with senior resident assigned.

## 2020-11-28 NOTE — PROGRESS NOTE ADULT - ASSESSMENT
[de-identified] : tal jenkins\par  IMPRESSION:    Acute hypoxic respiratory failure still on high oxygen 100%  COVID 19 pneumonia   HFREF  Possible superimposed infection   Worsening renal function oliguric   Blood cx /staph aureus bacteremia persistent blood cx + on 11/26    SUGGEST:      CNS: Avoid CNS depressant.  Seroquel 50 mg     HEENT: Oral care    PULMONARY:  HOB @ 45 degrees.  HHFNC alternate with BIPAP, keep Sao2 92 to 96%, Incentive spirometry    CARDIOVASCULAR:  cardio f/up, echo ro endocarditis    GI: GI prophylaxis.  Feeding as tolerated     RENAL:  Follow up lytes.  Correct as needed, repeat CMP  renal f/up need for HD?    INFECTIOUS DISEASE:  per ID. repeat CX on nafcillin    HEMATOLOGICAL:  AC , IV heparin    ENDOCRINE:  Follow up FS.  Insulin protocol if needed.  CEU  poor prognosis  palliative care consult IMPRESSION:    Acute hypoxic respiratory failure still on high oxygen 100%  COVID 19 pneumonia   HFREF  Possible superimposed infection   Worsening renal function oliguric   Blood cx /staph aureus bacteremia persistent blood cx + on 11/26    SUGGEST:      CNS: Avoid CNS depressant.  Seroquel 50 mg     HEENT: Oral care    PULMONARY:  HOB @ 45 degrees.  HHFNC alternate with BIPAP, keep Sao2 92 to 96%, Incentive spirometry    CARDIOVASCULAR:  cardio f/up, echo ro endocarditis    GI: GI prophylaxis.  Feeding as tolerated     RENAL:  Follow up lytes.  Correct as needed, repeat CMP  renal f/up need for HD?    INFECTIOUS DISEASE:  per ID. repeat CX 11/26 + on nafcillin    HEMATOLOGICAL:  AC , IV heparin    ENDOCRINE:  Follow up FS.  Insulin protocol if needed.  CEU  poor prognosis  palliative care consult

## 2020-11-28 NOTE — PROGRESS NOTE ADULT - SUBJECTIVE AND OBJECTIVE BOX
OVERNIGHT EVENTS: events noted, renal reviewed, afebrile    Vital Signs Last 24 Hrs  T(C): 35.6 (28 Nov 2020 06:11), Max: 36.3 (27 Nov 2020 19:00)  T(F): 96.1 (28 Nov 2020 06:11), Max: 97.4 (27 Nov 2020 19:00)  HR: 75 (28 Nov 2020 06:11) (72 - 106)  BP: 113/66 (28 Nov 2020 06:11) (90/67 - 113/66)  BP(mean): 83 (28 Nov 2020 06:11) (83 - 83)  RR: 20 (28 Nov 2020 06:11) (20 - 20)  SpO2: 96% (28 Nov 2020 06:11) (93% - 96%)    PHYSICAL EXAMINATION:    GENERAL: ill looking    HEENT: Head is normocephalic and atraumatic.     NECK: Supple.    LUNGS: bibasilar crackles    HEART: TAMMIE 3/6    ABDOMEN: Soft, nontender, and nondistended.      EXTREMITIES: Without any cyanosis, clubbing, rash, lesions or edema.    NEUROLOGIC: Grossly intact.    SKIN: No ulceration or induration present.      LABS:                        11.6   30.27 )-----------( 120      ( 27 Nov 2020 08:05 )             35.1     11-27    130<L>  |  92<L>  |  141<HH>  ----------------------------<  134<H>  5.2<H>   |  21  |  3.6<H>    Ca    7.5<L>      27 Nov 2020 08:05  Phos  6.8     11-27  Mg     3.1     11-27    TPro  4.6<L>  /  Alb  2.5<L>  /  TBili  1.6<H>  /  DBili  x   /  AST  28  /  ALT  6   /  AlkPhos  67  11-27    PTT - ( 28 Nov 2020 01:17 )  PTT:70.1 sec      CARDIAC MARKERS ( 27 Nov 2020 18:51 )  x     / 0.03 ng/mL / x     / x     / x      CARDIAC MARKERS ( 27 Nov 2020 12:27 )  x     / 0.04 ng/mL / x     / x     / x                      11-27-20 @ 07:01  -  11-28-20 @ 07:00  --------------------------------------------------------  IN: 1137 mL / OUT: 265 mL / NET: 872 mL        MICROBIOLOGY:  Culture Results:   Growth in anaerobic bottle: Gram Positive Cocci in Clusters (11-26 @ 06:55)      MEDICATIONS  (STANDING):  aspirin enteric coated 81 milliGRAM(s) Oral daily  atorvastatin 80 milliGRAM(s) Oral at bedtime  BACItracin   Ointment 1 Application(s) Topical four times a day  dexAMETHasone  Injectable 6 milliGRAM(s) IV Push daily  diltiazem    Tablet 30 milliGRAM(s) Oral four times a day  heparin  Infusion 900 Unit(s)/Hr (9 mL/Hr) IV Continuous <Continuous>  influenza   Vaccine 0.5 milliLiter(s) IntraMuscular once  lidocaine   Patch 1 Patch Transdermal daily  nafcillin  IVPB 2 Gram(s) IV Intermittent every 4 hours  pantoprazole    Tablet 40 milliGRAM(s) Oral before breakfast  QUEtiapine 50 milliGRAM(s) Oral at bedtime  QUEtiapine 25 milliGRAM(s) Oral at bedtime  sodium chloride 0.9%. 1000 milliLiter(s) (50 mL/Hr) IV Continuous <Continuous>    MEDICATIONS  (PRN):  acetaminophen   Tablet .. 650 milliGRAM(s) Oral every 4 hours PRN Mild Pain (1 - 3)  morphine  - Injectable 1 milliGRAM(s) IV Push every 4 hours PRN Severe Pain (7 - 10)  oxycodone    5 mG/acetaminophen 325 mG 1 Tablet(s) Oral every 4 hours PRN Moderate Pain (4 - 6)  zolpidem 5 milliGRAM(s) Oral at bedtime PRN Insomnia      RADIOLOGY & ADDITIONAL STUDIES:         OVERNIGHT EVENTS: events noted, renal reviewed, afebrile, on BIPAP worsening renal function    Vital Signs Last 24 Hrs  T(C): 35.6 (28 Nov 2020 06:11), Max: 36.3 (27 Nov 2020 19:00)  T(F): 96.1 (28 Nov 2020 06:11), Max: 97.4 (27 Nov 2020 19:00)  HR: 75 (28 Nov 2020 06:11) (72 - 106)  BP: 113/66 (28 Nov 2020 06:11) (90/67 - 113/66)  BP(mean): 83 (28 Nov 2020 06:11) (83 - 83)  RR: 20 (28 Nov 2020 06:11) (20 - 20)  SpO2: 96% (28 Nov 2020 06:11) (93% - 96%)    PHYSICAL EXAMINATION:    GENERAL: ill looking    HEENT: Head is normocephalic and atraumatic.     NECK: Supple.    LUNGS: bibasilar crackles    HEART: TAMMIE 3/6    ABDOMEN: Soft, nontender, and nondistended.      EXTREMITIES: Without any cyanosis, clubbing, rash, lesions or edema.    NEUROLOGIC: Grossly intact.    SKIN: No ulceration or induration present.      LABS:                        11.6   30.27 )-----------( 120      ( 27 Nov 2020 08:05 )             35.1     11-27    130<L>  |  92<L>  |  141<HH>  ----------------------------<  134<H>  5.2<H>   |  21  |  3.6<H>    Ca    7.5<L>      27 Nov 2020 08:05  Phos  6.8     11-27  Mg     3.1     11-27    TPro  4.6<L>  /  Alb  2.5<L>  /  TBili  1.6<H>  /  DBili  x   /  AST  28  /  ALT  6   /  AlkPhos  67  11-27    PTT - ( 28 Nov 2020 01:17 )  PTT:70.1 sec      CARDIAC MARKERS ( 27 Nov 2020 18:51 )  x     / 0.03 ng/mL / x     / x     / x      CARDIAC MARKERS ( 27 Nov 2020 12:27 )  x     / 0.04 ng/mL / x     / x     / x                      11-27-20 @ 07:01  -  11-28-20 @ 07:00  --------------------------------------------------------  IN: 1137 mL / OUT: 265 mL / NET: 872 mL        MICROBIOLOGY:  Culture Results:   Growth in anaerobic bottle: Gram Positive Cocci in Clusters (11-26 @ 06:55)      MEDICATIONS  (STANDING):  aspirin enteric coated 81 milliGRAM(s) Oral daily  atorvastatin 80 milliGRAM(s) Oral at bedtime  BACItracin   Ointment 1 Application(s) Topical four times a day  dexAMETHasone  Injectable 6 milliGRAM(s) IV Push daily  diltiazem    Tablet 30 milliGRAM(s) Oral four times a day  heparin  Infusion 900 Unit(s)/Hr (9 mL/Hr) IV Continuous <Continuous>  influenza   Vaccine 0.5 milliLiter(s) IntraMuscular once  lidocaine   Patch 1 Patch Transdermal daily  nafcillin  IVPB 2 Gram(s) IV Intermittent every 4 hours  pantoprazole    Tablet 40 milliGRAM(s) Oral before breakfast  QUEtiapine 50 milliGRAM(s) Oral at bedtime  QUEtiapine 25 milliGRAM(s) Oral at bedtime  sodium chloride 0.9%. 1000 milliLiter(s) (50 mL/Hr) IV Continuous <Continuous>    MEDICATIONS  (PRN):  acetaminophen   Tablet .. 650 milliGRAM(s) Oral every 4 hours PRN Mild Pain (1 - 3)  morphine  - Injectable 1 milliGRAM(s) IV Push every 4 hours PRN Severe Pain (7 - 10)  oxycodone    5 mG/acetaminophen 325 mG 1 Tablet(s) Oral every 4 hours PRN Moderate Pain (4 - 6)  zolpidem 5 milliGRAM(s) Oral at bedtime PRN Insomnia      RADIOLOGY & ADDITIONAL STUDIES:

## 2020-11-28 NOTE — PROCEDURE NOTE - NSPROCDETAILS_GEN_ALL_CORE
sterile dressing applied/sterile technique, catheter placed/lumen(s) aspirated and flushed/guidewire recovered/ultrasound guidance

## 2020-11-28 NOTE — PROGRESS NOTE ADULT - ASSESSMENT
Patient is a 75y Male whom presented to the hospital with 10 day history COVID dx, SOB and weakness, today is day 17. PMH of AAA s/p repair, HTN, HLD, CAD. Nephrology has been consulted for SVITLANA, elevated creatine and worsening Kidney function        SVITLANA   - Creatine 2.3 increased from 1.8 in the last 24 hours BL 1.1   - BUN - 129 steady increase from 21 on admission - Could be due to steroids   - K+ - 4.9   - GFR - 26   - Lisinopril on hold - last administered 11/21   - MSSA Bacteremia since 11/22 started on 11/25 Nafcillin 2gQ4h    - Order UA, FeNA  - Bladder and Kidney US - r/o obstruction   - Monitor BMP / Blood pressure / Urine output   - Continue NS 50 cc/hr   - Will reassess pending labs       This is an incomplete note with full recommendations to follow. Patient is a 75y Male whom presented to the hospital with 10 day history COVID dx, SOB and weakness, today is day 17. PMH of AAA s/p repair, HTN, HLD, CAD. Nephrology has been consulted for SVITLANA, elevated creatine and worsening Kidney function      # SVITLANA most likely ATN / Covid infection rule out cytokine storm / oligoanuric / hyperkalemic / worsening BUN creat  - will initiate RRT / case discussed with daughter Bonny -[phone] who wants us to try RRT / daughter understands that treatment is supportive and will be done as long as BP allows us to do it and would not affect prognosis which is grim   - High BUn partially due to steroids too   - for U dall today followed by HD   - continue LOkelma for now / increase to 10 g q12h   - MSSA Bacteremia since 11/22 started on 11/25 Nafcillin 2gQ4h    - Bladder and Kidney US - r/o obstruction   - DC IVF follow BMP   - Will follow/ prognosis poor

## 2020-11-28 NOTE — DISCHARGE NOTE FOR THE EXPIRED PATIENT - HOSPITAL COURSE
74 yo male, pmh of AAA s/p repari, htn, hld, cad, presents to ed for sob and severe weakness, Pt was dx with covid-19  10 days ago, today felt more weak and sob, ems found the pt to be hypoxic   COVID19 with severe/critical  illness. Hypoxemia BIPAP  . CXR >50% opacities.  Pt is in the late immune/inflammatory  mediated phase based on the onset of symptoms >10 d   his hospitalization was complicated by SVITLANA requiring dialysis    the patient was found to be pulsless by the RN . I was called and the pt was pronounced dead. He was DNR/DNI as per the family

## 2020-11-28 NOTE — PROCEDURE NOTE - NSICDXPROCEDURE_GEN_ALL_CORE_FT
PROCEDURES:  Insertion of non-tunneled hemodialysis catheter with ultrasound guidance 28-Nov-2020 16:22:16  Nida Hays

## 2020-11-28 NOTE — PROGRESS NOTE ADULT - SUBJECTIVE AND OBJECTIVE BOX
MIGUEL ALMODOVAR  75y, Male  Allergy: No Known Allergies    Hospital Day: 18d    Patient seen and examined earlier today in CEU    LAST 24-Hr EVENTS:  No events  Controlled heart rate  C/O back pain    VITALS:  T(F): 96.1 (11-28-20 @ 06:11), Max: 97.4 (11-27-20 @ 19:00)  HR: 75 (11-28-20 @ 06:11)  BP: 113/66 (11-28-20 @ 06:11) (90/67 - 113/66)  RR: 20 (11-28-20 @ 06:11)  SpO2: 93% (11-28-20 @ 07:32) on Non-invasive positive pressure ventilation (NIPPV)         TESTS & MEASUREMENTS:    11-26-20 @ 07:01  -  11-27-20 @ 07:00  --------------------------------------------------------  IN: 617.7 mL / OUT: 1100 mL / NET: -482.3 mL    11-27-20 @ 07:01  -  11-28-20 @ 07:00  --------------------------------------------------------  IN: 1137 mL / OUT: 365 mL / NET: 772 mL    11-28-20 @ 07:01  -  11-28-20 @ 09:40  --------------------------------------------------------  IN: 177 mL / OUT: 0 mL / NET: 177 mL                            11.8   24.00 )-----------( 119      ( 28 Nov 2020 06:21 )             35.5     WBC Trend:  WBC Count: 24.00 (11-28 @ 06:21)  WBC Count: 30.27 (11-27 @ 08:05)  WBC Count: 26.21 (11-26 @ 06:55)        PTT - ( 28 Nov 2020 06:21 )  PTT:66.6 sec  11-27    130<L>  |  92<L>  |  141<HH>  ----------------------------<  134<H>  5.2<H>   |  21  |  3.6<H>    Creatinine Trend:  3.6 (11-27 @ 08:05)    2.3 (11-26 @ 06:55)    1.8 (11-25 @ 05:27)    1.8 (11-24 @ 06:23)      Sodium Trend:  Sodium, Serum: 130 mmol/L (11-27-20 @ 08:05)  Sodium, Serum: 132 mmol/L (11-26-20 @ 06:55)      Ca    7.5<L>      27 Nov 2020 08:05  Phos  6.8     11-27  Mg     3.1     11-27    TPro  4.6<L>  /  Alb  2.5<L>  /  TBili  1.6<H>  /  DBili  x   /  AST  28  /  ALT  6   /  AlkPhos  67  11-27    LIVER FUNCTIONS - ( 27 Nov 2020 08:05 )  Alb: 2.5 g/dL / Pro: 4.6 g/dL / ALK PHOS: 67 U/L / ALT: 6 U/L / AST: 28 U/L / GGT: x           CARDIAC MARKERS ( 28 Nov 2020 06:21 )  x     / 0.04 ng/mL / x     / x     / x      CARDIAC MARKERS ( 27 Nov 2020 18:51 )  x     / 0.03 ng/mL / x     / x     / x      CARDIAC MARKERS ( 27 Nov 2020 12:27 )  x     / 0.04 ng/mL / x     / x     / x            Culture - Blood (collected 11-26-20 @ 06:55)  Source: .Blood None  Gram Stain (11-28-20 @ 01:15):    Growth in anaerobic bottle: Gram Positive Cocci in Clusters  Preliminary Report (11-28-20 @ 01:15):    Growth in anaerobic bottle: Gram Positive Cocci in Clusters    Culture - Blood (collected 11-22-20 @ 20:50)  Source: .Blood None  Gram Stain (11-23-20 @ 20:33):    Growth in aerobic bottle: Gram Positive Cocci in Clusters    Growth in anaerobic bottle: Gram Positive Cocci in Clusters  Final Report (11-25-20 @ 12:34):  Staphylococcus aureus (11-25-20 @ 12:34)  Organism: Staphylococcus aureus (11-25-20 @ 12:34)      -  Ampicillin/Sulbactam: S <=8/4      -  Cefazolin: S <=4      -  Clindamycin: S <=0.25      -  Erythromycin: R >4      -  Gentamicin: S <=1 Should not be used as monotherapy      -  Oxacillin: S 1      -  Penicillin: R >8      -  RIF- Rifampin: S <=1 Should not be used as monotherapy      -  Tetra/Doxy: S <=1      -  Trimethoprim/Sulfamethoxazole: S <=0.5/9.5      -  Vancomycin: S 2      Method Type: HENRIETTA  Organism: Blood Culture PCR (11-25-20 @ 12:34)      -  Staphylococcus aureus: Detec Any isolate of Staphylococcus aureus from a blood culture is NOT considered a contaminant.      Method Type: PCR        Procalcitonin, Serum: 0.75 ng/mL (11-25-20 @ 05:27)  Procalcitonin, Serum: 0.84 ng/mL (11-23-20 @ 08:11)  Procalcitonin, Serum: 0.48 ng/mL (11-22-20 @ 20:50)  Procalcitonin, Serum: 0.13 ng/mL (11-21-20 @ 10:54)    D-Dimer Assay, Quantitative: 569 ng/mL DDU (11-28-20 @ 06:21)  D-Dimer Assay, Quantitative: 589 ng/mL DDU (11-25-20 @ 05:27)  D-Dimer Assay, Quantitative: 544 ng/mL DDU (11-24-20 @ 06:23)  D-Dimer Assay, Quantitative: 779 ng/mL DDU (11-23-20 @ 08:11)  D-Dimer Assay, Quantitative: 481 ng/mL DDU (11-22-20 @ 08:23)  D-Dimer Assay, Quantitative: 417 ng/mL DDU (11-21-20 @ 06:29)    Ferritin, Serum: 1847 ng/mL (11-23-20 @ 08:11)  Ferritin, Serum: 1176 ng/mL (11-21-20 @ 10:54)      RADIOLOGY, ECG, & ADDITIONAL TESTS:  12 Lead ECG:   Ventricular Rate 74 BPM    Atrial Rate 74 BPM    P-R Interval 258 ms    QRS Duration 116 ms    Q-T Interval 402 ms    QTC Calculation(Bazett) 446 ms    P Axis -29 degrees    R Axis -37 degrees    T Axis 104 degrees    Diagnosis Line Sinus rhythm with 1st degree A-V block  Left axis deviation  Left ventricular hypertrophy with QRS widening and repolarization abnormality  Inferior-posterior infarct , possibly acute    Abnormal ECG    Confirmed by Herbie Thibodeaux ([id number]) on 11/28/2020 8:35:43 AM (11-27-20 @ 19:08)  12 Lead ECG:   Ventricular Rate 80 BPM    Atrial Rate 80 BPM    P-R Interval 198 ms    QRS Duration 126 ms    Q-T Interval 376 ms    QTC Calculation(Bazett) 433 ms    P Axis 38 degrees    R Axis -3 degrees    T Axis 38 degrees    Diagnosis Line Normal sinus rhythm  Non-specific intra-ventricular conduction block  Inferior infarct , age undetermined  Early transition Posterior infarct Possible  Nonspecific ST and T wave abnormality  Abnormal ECG          MEDICATIONS:  MEDICATIONS  (STANDING):  aspirin enteric coated 81 milliGRAM(s) Oral daily  atorvastatin 80 milliGRAM(s) Oral at bedtime  BACItracin   Ointment 1 Application(s) Topical four times a day  dexAMETHasone  Injectable 6 milliGRAM(s) IV Push daily  diltiazem    Tablet 30 milliGRAM(s) Oral four times a day  heparin  Infusion 900 Unit(s)/Hr (9 mL/Hr) IV Continuous <Continuous>  influenza   Vaccine 0.5 milliLiter(s) IntraMuscular once  lidocaine   Patch 1 Patch Transdermal daily  nafcillin  IVPB 2 Gram(s) IV Intermittent every 4 hours  pantoprazole    Tablet 40 milliGRAM(s) Oral before breakfast  QUEtiapine 50 milliGRAM(s) Oral at bedtime  QUEtiapine 25 milliGRAM(s) Oral at bedtime  sodium chloride 0.9%. 1000 milliLiter(s) (50 mL/Hr) IV Continuous <Continuous>    MEDICATIONS  (PRN):  acetaminophen   Tablet .. 650 milliGRAM(s) Oral every 4 hours PRN Mild Pain (1 - 3)  morphine  - Injectable 2 milliGRAM(s) IV Push every 4 hours PRN Severe Pain (7 - 10)  oxycodone    5 mG/acetaminophen 325 mG 1 Tablet(s) Oral every 4 hours PRN Moderate Pain (4 - 6)  zolpidem 5 milliGRAM(s) Oral at bedtime PRN Insomnia      HOME MEDICATIONS:  Aspir 81 oral delayed release tablet (11-10)  Lipitor 80 mg oral tablet (11-10)  lisinopril 10 mg oral tablet (11-10)  metoprolol succinate 50 mg oral tablet, extended release (11-10)      PHYSICAL EXAM:  GENERAL:   NECK:   CHEST/LUNG:   HEART:   ABDOMEN:   EXTREMITIES:               MIGUEL ALMODOVAR  75y, Male  Allergy: No Known Allergies    Hospital Day: 18d    Patient seen and examined earlier today in CEU    LAST 24-Hr EVENTS:  No events  Controlled heart rate  C/O back pain    VITALS:  T(F): 96.1 (11-28-20 @ 06:11), Max: 97.4 (11-27-20 @ 19:00)  HR: 75 (11-28-20 @ 06:11)  BP: 113/66 (11-28-20 @ 06:11) (90/67 - 113/66)  RR: 20 (11-28-20 @ 06:11)  SpO2: 93% (11-28-20 @ 07:32) on Non-invasive positive pressure ventilation (NIPPV)         TESTS & MEASUREMENTS:    11-26-20 @ 07:01  -  11-27-20 @ 07:00  --------------------------------------------------------  IN: 617.7 mL / OUT: 1100 mL / NET: -482.3 mL    11-27-20 @ 07:01  -  11-28-20 @ 07:00  --------------------------------------------------------  IN: 1137 mL / OUT: 365 mL / NET: 772 mL    11-28-20 @ 07:01  -  11-28-20 @ 09:40  --------------------------------------------------------  IN: 177 mL / OUT: 0 mL / NET: 177 mL                            11.8   24.00 )-----------( 119      ( 28 Nov 2020 06:21 )             35.5     WBC Trend:  WBC Count: 24.00 (11-28 @ 06:21)  WBC Count: 30.27 (11-27 @ 08:05)  WBC Count: 26.21 (11-26 @ 06:55)        PTT - ( 28 Nov 2020 06:21 )  PTT:66.6 sec  11-27    130<L>  |  92<L>  |  141<HH>  ----------------------------<  134<H>  5.2<H>   |  21  |  3.6<H>    Creatinine Trend:  3.6 (11-27 @ 08:05)    2.3 (11-26 @ 06:55)    1.8 (11-25 @ 05:27)    1.8 (11-24 @ 06:23)      Sodium Trend:  Sodium, Serum: 130 mmol/L (11-27-20 @ 08:05)  Sodium, Serum: 132 mmol/L (11-26-20 @ 06:55)      Ca    7.5<L>      27 Nov 2020 08:05  Phos  6.8     11-27  Mg     3.1     11-27    TPro  4.6<L>  /  Alb  2.5<L>  /  TBili  1.6<H>  /  DBili  x   /  AST  28  /  ALT  6   /  AlkPhos  67  11-27    LIVER FUNCTIONS - ( 27 Nov 2020 08:05 )  Alb: 2.5 g/dL / Pro: 4.6 g/dL / ALK PHOS: 67 U/L / ALT: 6 U/L / AST: 28 U/L / GGT: x           CARDIAC MARKERS ( 28 Nov 2020 06:21 )  x     / 0.04 ng/mL / x     / x     / x      CARDIAC MARKERS ( 27 Nov 2020 18:51 )  x     / 0.03 ng/mL / x     / x     / x      CARDIAC MARKERS ( 27 Nov 2020 12:27 )  x     / 0.04 ng/mL / x     / x     / x            Culture - Blood (collected 11-26-20 @ 06:55)  Source: .Blood None  Gram Stain (11-28-20 @ 01:15):    Growth in anaerobic bottle: Gram Positive Cocci in Clusters  Preliminary Report (11-28-20 @ 01:15):    Growth in anaerobic bottle: Gram Positive Cocci in Clusters    Culture - Blood (collected 11-22-20 @ 20:50)  Source: .Blood None  Gram Stain (11-23-20 @ 20:33):    Growth in aerobic bottle: Gram Positive Cocci in Clusters    Growth in anaerobic bottle: Gram Positive Cocci in Clusters  Final Report (11-25-20 @ 12:34):  Staphylococcus aureus (11-25-20 @ 12:34)  Organism: Staphylococcus aureus (11-25-20 @ 12:34)      -  Ampicillin/Sulbactam: S <=8/4      -  Cefazolin: S <=4      -  Clindamycin: S <=0.25      -  Erythromycin: R >4      -  Gentamicin: S <=1 Should not be used as monotherapy      -  Oxacillin: S 1      -  Penicillin: R >8      -  RIF- Rifampin: S <=1 Should not be used as monotherapy      -  Tetra/Doxy: S <=1      -  Trimethoprim/Sulfamethoxazole: S <=0.5/9.5      -  Vancomycin: S 2      Method Type: HENRIETTA  Organism: Blood Culture PCR (11-25-20 @ 12:34)      -  Staphylococcus aureus: Detec Any isolate of Staphylococcus aureus from a blood culture is NOT considered a contaminant.      Method Type: PCR        Procalcitonin, Serum: 0.75 ng/mL (11-25-20 @ 05:27)  Procalcitonin, Serum: 0.84 ng/mL (11-23-20 @ 08:11)  Procalcitonin, Serum: 0.48 ng/mL (11-22-20 @ 20:50)  Procalcitonin, Serum: 0.13 ng/mL (11-21-20 @ 10:54)    D-Dimer Assay, Quantitative: 569 ng/mL DDU (11-28-20 @ 06:21)  D-Dimer Assay, Quantitative: 589 ng/mL DDU (11-25-20 @ 05:27)  D-Dimer Assay, Quantitative: 544 ng/mL DDU (11-24-20 @ 06:23)  D-Dimer Assay, Quantitative: 779 ng/mL DDU (11-23-20 @ 08:11)  D-Dimer Assay, Quantitative: 481 ng/mL DDU (11-22-20 @ 08:23)  D-Dimer Assay, Quantitative: 417 ng/mL DDU (11-21-20 @ 06:29)    Ferritin, Serum: 1847 ng/mL (11-23-20 @ 08:11)  Ferritin, Serum: 1176 ng/mL (11-21-20 @ 10:54)      RADIOLOGY, ECG, & ADDITIONAL TESTS:  12 Lead ECG:   Ventricular Rate 74 BPM    Atrial Rate 74 BPM    P-R Interval 258 ms    QRS Duration 116 ms    Q-T Interval 402 ms    QTC Calculation(Bazett) 446 ms    P Axis -29 degrees    R Axis -37 degrees    T Axis 104 degrees    Diagnosis Line Sinus rhythm with 1st degree A-V block  Left axis deviation  Left ventricular hypertrophy with QRS widening and repolarization abnormality  Inferior-posterior infarct , possibly acute    Abnormal ECG    Confirmed by Herbie Thibodeaux ([id number]) on 11/28/2020 8:35:43 AM (11-27-20 @ 19:08)  12 Lead ECG:   Ventricular Rate 80 BPM    Atrial Rate 80 BPM    P-R Interval 198 ms    QRS Duration 126 ms    Q-T Interval 376 ms    QTC Calculation(Bazett) 433 ms    P Axis 38 degrees    R Axis -3 degrees    T Axis 38 degrees    Diagnosis Line Normal sinus rhythm  Non-specific intra-ventricular conduction block  Inferior infarct , age undetermined  Early transition Posterior infarct Possible  Nonspecific ST and T wave abnormality  Abnormal ECG          MEDICATIONS:  MEDICATIONS  (STANDING):  aspirin enteric coated 81 milliGRAM(s) Oral daily  atorvastatin 80 milliGRAM(s) Oral at bedtime  BACItracin   Ointment 1 Application(s) Topical four times a day  dexAMETHasone  Injectable 6 milliGRAM(s) IV Push daily  diltiazem    Tablet 30 milliGRAM(s) Oral four times a day  heparin  Infusion 900 Unit(s)/Hr (9 mL/Hr) IV Continuous <Continuous>  influenza   Vaccine 0.5 milliLiter(s) IntraMuscular once  lidocaine   Patch 1 Patch Transdermal daily  nafcillin  IVPB 2 Gram(s) IV Intermittent every 4 hours  pantoprazole    Tablet 40 milliGRAM(s) Oral before breakfast  QUEtiapine 50 milliGRAM(s) Oral at bedtime  QUEtiapine 25 milliGRAM(s) Oral at bedtime  sodium chloride 0.9%. 1000 milliLiter(s) (50 mL/Hr) IV Continuous <Continuous>    MEDICATIONS  (PRN):  acetaminophen   Tablet .. 650 milliGRAM(s) Oral every 4 hours PRN Mild Pain (1 - 3)  morphine  - Injectable 2 milliGRAM(s) IV Push every 4 hours PRN Severe Pain (7 - 10)  oxycodone    5 mG/acetaminophen 325 mG 1 Tablet(s) Oral every 4 hours PRN Moderate Pain (4 - 6)  zolpidem 5 milliGRAM(s) Oral at bedtime PRN Insomnia      HOME MEDICATIONS:  Aspir 81 oral delayed release tablet (11-10)  Lipitor 80 mg oral tablet (11-10)  lisinopril 10 mg oral tablet (11-10)  metoprolol succinate 50 mg oral tablet, extended release (11-10)      PHYSICAL EXAM:  GENERAL: Uncomfortable/ on Non-invasive positive pressure ventilation (NIPPV)   NECK: No edema  CHEST/LUNG: Good air entry (ant ausc)  HEART: S1S2 regular  ABDOMEN: Obese/ soft/ ecchymotic (previous lovenox injection sites)  EXTREMITIES:  chronic skin changes/ bruises and ecchymosis (blood draws sites)             MIGUEL ALMODOVAR  75y, Male  Allergy: No Known Allergies    Hospital Day: 18d    Patient seen and examined earlier today in CE[facility]    LAST 24-Hr EVENTS:  No events  Controlled heart rate  C/O back pain    VITALS:  T(F): 96.1 (11-28-20 @ 06:11), Max: 97.4 (11-27-20 @ 19:00)  HR: 75 (11-28-20 @ 06:11)  BP: 113/66 (11-28-20 @ 06:11) (90/67 - 113/66)  RR: 20 (11-28-20 @ 06:11)  SpO2: 93% (11-28-20 @ 07:32) on Non-invasive positive pressure ventilation (NIPPV)         TESTS & MEASUREMENTS:    11-26-20 @ 07:01  -  11-27-20 @ 07:00  --------------------------------------------------------  IN: 617.7 mL / OUT: 1100 mL / NET: -482.3 mL    11-27-20 @ 07:01  -  11-28-20 @ 07:00  --------------------------------------------------------  IN: 1137 mL / OUT: 365 mL / NET: 772 mL    11-28-20 @ 07:01  -  11-28-20 @ 09:40  --------------------------------------------------------  IN: 177 mL / OUT: 0 mL / NET: 177 mL                            11.8   24.00 )-----------( 119      ( 28 Nov 2020 06:21 )             35.5     WBC Trend:  WBC Count: 24.00 (11-28 @ 06:21)  WBC Count: 30.27 (11-27 @ 08:05)  WBC Count: 26.21 (11-26 @ 06:55)        PTT - ( 28 Nov 2020 06:21 )  PTT:66.6 sec  11-27    130<L>  |  92<L>  |  141<HH>  ----------------------------<  134<H>  5.2<H>   |  21  |  3.6<H>    Creatinine Trend:  3.6 (11-27 @ 08:05)    2.3 (11-26 @ 06:55)    1.8 (11-25 @ 05:27)    1.8 (11-24 @ 06:23)      Sodium Trend:  Sodium, Serum: 130 mmol/L (11-27-20 @ 08:05)  Sodium, Serum: 132 mmol/L (11-26-20 @ 06:55)      Ca    7.5<L>      27 Nov 2020 08:05  Phos  6.8     11-27  Mg     3.1     11-27    TPro  4.6<L>  /  Alb  2.5<L>  /  TBili  1.6<H>  /  DBili  x   /  AST  28  /  ALT  6   /  AlkPhos  67  11-27    LIVER FUNCTIONS - ( 27 Nov 2020 08:05 )  Alb: 2.5 g/dL / Pro: 4.6 g/dL / ALK PHOS: 67 U/L / ALT: 6 U/L / AST: 28 U/L / GGT: x           CARDIAC MARKERS ( 28 Nov 2020 06:21 )  x     / 0.04 ng/mL / x     / x     / x      CARDIAC MARKERS ( 27 Nov 2020 18:51 )  x     / 0.03 ng/mL / x     / x     / x      CARDIAC MARKERS ( 27 Nov 2020 12:27 )  x     / 0.04 ng/mL / x     / x     / x            Culture - Blood (collected 11-26-20 @ 06:55)  Source: .Blood None  Gram Stain (11-28-20 @ 01:15):    Growth in anaerobic bottle: Gram Positive Cocci in Clusters  Preliminary Report (11-28-20 @ 01:15):    Growth in anaerobic bottle: Gram Positive Cocci in Clusters    Culture - Blood (collected 11-22-20 @ 20:50)  Source: .Blood None  Gram Stain (11-23-20 @ 20:33):    Growth in aerobic bottle: Gram Positive Cocci in Clusters    Growth in anaerobic bottle: Gram Positive Cocci in Clusters  Final Report (11-25-20 @ 12:34):  Staphylococcus aureus (11-25-20 @ 12:34)  Organism: Staphylococcus aureus (11-25-20 @ 12:34)      -  Ampicillin/Sulbactam: S <=8/4      -  Cefazolin: S <=4      -  Clindamycin: S <=0.25      -  Erythromycin: R >4      -  Gentamicin: S <=1 Should not be used as monotherapy      -  Oxacillin: S 1      -  Penicillin: R >8      -  RIF- Rifampin: S <=1 Should not be used as monotherapy      -  Tetra/Doxy: S <=1      -  Trimethoprim/Sulfamethoxazole: S <=0.5/9.5      -  Vancomycin: S 2      Method Type: HENRIETTA  Organism: Blood Culture PCR (11-25-20 @ 12:34)      -  Staphylococcus aureus: Detec Any isolate of Staphylococcus aureus from a blood culture is NOT considered a contaminant.      Method Type: PCR        Procalcitonin, Serum: 0.75 ng/mL (11-25-20 @ 05:27)  Procalcitonin, Serum: 0.84 ng/mL (11-23-20 @ 08:11)  Procalcitonin, Serum: 0.48 ng/mL (11-22-20 @ 20:50)  Procalcitonin, Serum: 0.13 ng/mL (11-21-20 @ 10:54)    D-Dimer Assay, Quantitative: 569 ng/mL DDU (11-28-20 @ 06:21)  D-Dimer Assay, Quantitative: 589 ng/mL DDU (11-25-20 @ 05:27)  D-Dimer Assay, Quantitative: 544 ng/mL DDU (11-24-20 @ 06:23)  D-Dimer Assay, Quantitative: 779 ng/mL DDU (11-23-20 @ 08:11)  D-Dimer Assay, Quantitative: 481 ng/mL DDU (11-22-20 @ 08:23)  D-Dimer Assay, Quantitative: 417 ng/mL DDU (11-21-20 @ 06:29)    Ferritin, Serum: 1847 ng/mL (11-23-20 @ 08:11)  Ferritin, Serum: 1176 ng/mL (11-21-20 @ 10:54)      RADIOLOGY, ECG, & ADDITIONAL TESTS:  12 Lead ECG:   Ventricular Rate 74 BPM    Atrial Rate 74 BPM    P-R Interval 258 ms    QRS Duration 116 ms    Q-T Interval 402 ms    QTC Calculation(Bazett) 446 ms    P Axis -29 degrees    R Axis -37 degrees    T Axis 104 degrees    Diagnosis Line Sinus rhythm with 1st degree A-V block  Left axis deviation  Left ventricular hypertrophy with QRS widening and repolarization abnormality  Inferior-posterior infarct , possibly acute    Abnormal ECG    Confirmed by Herbie Thibodeaux ([id number]) on 11/28/2020 8:35:43 AM (11-27-20 @ 19:08)  12 Lead ECG:   Ventricular Rate 80 BPM    Atrial Rate 80 BPM    P-R Interval 198 ms    QRS Duration 126 ms    Q-T Interval 376 ms    QTC Calculation(Bazett) 433 ms    P Axis 38 degrees    R Axis -3 degrees    T Axis 38 degrees    Diagnosis Line Normal sinus rhythm  Non-specific intra-ventricular conduction block  Inferior infarct , age undetermined  Early transition Posterior infarct Possible  Nonspecific ST and T wave abnormality  Abnormal ECG          MEDICATIONS:  MEDICATIONS  (STANDING):  aspirin enteric coated 81 milliGRAM(s) Oral daily  atorvastatin 80 milliGRAM(s) Oral at bedtime  BACItracin   Ointment 1 Application(s) Topical four times a day  dexAMETHasone  Injectable 6 milliGRAM(s) IV Push daily  diltiazem    Tablet 30 milliGRAM(s) Oral four times a day  heparin  Infusion 900 Unit(s)/Hr (9 mL/Hr) IV Continuous <Continuous>  influenza   Vaccine 0.5 milliLiter(s) IntraMuscular once  lidocaine   Patch 1 Patch Transdermal daily  nafcillin  IVPB 2 Gram(s) IV Intermittent every 4 hours  pantoprazole    Tablet 40 milliGRAM(s) Oral before breakfast  QUEtiapine 50 milliGRAM(s) Oral at bedtime  QUEtiapine 25 milliGRAM(s) Oral at bedtime  sodium chloride 0.9%. 1000 milliLiter(s) (50 mL/Hr) IV Continuous <Continuous>    MEDICATIONS  (PRN):  acetaminophen   Tablet .. 650 milliGRAM(s) Oral every 4 hours PRN Mild Pain (1 - 3)  morphine  - Injectable 2 milliGRAM(s) IV Push every 4 hours PRN Severe Pain (7 - 10)  oxycodone    5 mG/acetaminophen 325 mG 1 Tablet(s) Oral every 4 hours PRN Moderate Pain (4 - 6)  zolpidem 5 milliGRAM(s) Oral at bedtime PRN Insomnia      HOME MEDICATIONS:  Aspir 81 oral delayed release tablet (11-10)  Lipitor 80 mg oral tablet (11-10)  lisinopril 10 mg oral tablet (11-10)  metoprolol succinate 50 mg oral tablet, extended release (11-10)      PHYSICAL EXAM:  GENERAL: Uncomfortable/ on Non-invasive positive pressure ventilation (NIPPV)   NECK: No edema  CHEST/LUNG: Good air entry (ant ausc)  HEART: S1S2 regular  ABDOMEN: Obese/ soft/ ecchymotic (previous lovenox injection sites)/ NO De   EXTREMITIES:  chronic skin changes/ bruises and ecchymosis (blood draws sites)

## 2020-11-28 NOTE — PROGRESS NOTE ADULT - REASON FOR ADMISSION
[email or web address]

## 2020-11-28 NOTE — PROGRESS NOTE ADULT - SUBJECTIVE AND OBJECTIVE BOX
Nephrology progress note    THIS IS AN INCOMPLETE NOTE . FULL NOTE TO FOLLOW SHORTLY    Patient is seen and examined, events over the last 24 h noted .    Allergies:  No Known Allergies    Hospital Medications:   MEDICATIONS  (STANDING):  aspirin enteric coated 81 milliGRAM(s) Oral daily  atorvastatin 80 milliGRAM(s) Oral at bedtime  BACItracin   Ointment 1 Application(s) Topical four times a day  dexAMETHasone  Injectable 6 milliGRAM(s) IV Push daily  dextrose 50% Injectable 50 milliLiter(s) IV Push once  heparin  Infusion 900 Unit(s)/Hr (9 mL/Hr) IV Continuous <Continuous>  influenza   Vaccine 0.5 milliLiter(s) IntraMuscular once  insulin regular  human recombinant 10 Unit(s) IV Push once  lidocaine   Patch 1 Patch Transdermal daily  metoprolol tartrate 25 milliGRAM(s) Oral two times a day  nafcillin  IVPB 2 Gram(s) IV Intermittent every 4 hours  pantoprazole    Tablet 40 milliGRAM(s) Oral before breakfast  QUEtiapine 50 milliGRAM(s) Oral at bedtime  QUEtiapine 25 milliGRAM(s) Oral at bedtime  sodium chloride 0.9%. 1000 milliLiter(s) (50 mL/Hr) IV Continuous <Continuous>  sodium zirconium cyclosilicate 5 Gram(s) Oral two times a day        VITALS:  T(F): 96.1 (11-28-20 @ 06:11), Max: 97.4 (11-27-20 @ 19:00)  HR: 75 (11-28-20 @ 06:11)  BP: 113/66 (11-28-20 @ 06:11)  RR: 20 (11-28-20 @ 06:11)  SpO2: 93% (11-28-20 @ 07:32)  Wt(kg): --    11-26 @ 07:01  -  11-27 @ 07:00  --------------------------------------------------------  IN: 617.7 mL / OUT: 1100 mL / NET: -482.3 mL    11-27 @ 07:01  -  11-28 @ 07:00  --------------------------------------------------------  IN: 1137 mL / OUT: 365 mL / NET: 772 mL    11-28 @ 07:01  -  11-28 @ 10:38  --------------------------------------------------------  IN: 177 mL / OUT: 0 mL / NET: 177 mL          PHYSICAL EXAM:  Constitutional: NAD  HEENT: anicteric sclera, oropharynx clear, MMM  Neck: No JVD  Respiratory: CTAB, no wheezes, rales or rhonchi  Cardiovascular: S1, S2, RRR  Gastrointestinal: BS+, soft, NT/ND  Extremities: No cyanosis or clubbing. No peripheral edema  :  No ponce.   Skin: No rashes    LABS:  11-28    130<L>  |  92<L>  |  160<HH>  ----------------------------<  97  6.3<HH>   |  19  |  4.7<HH>    Ca    7.3<L>      28 Nov 2020 06:21  Phos  6.8     11-27  Mg     3.1     11-27    TPro  4.5<L>  /  Alb  2.4<L>  /  TBili  2.5<H>  /  DBili      /  AST  29  /  ALT  6   /  AlkPhos  65  11-28                          11.8   24.00 )-----------( 119      ( 28 Nov 2020 06:21 )             35.5       Urine Studies:      RADIOLOGY & ADDITIONAL STUDIES:   Nephrology progress note  Patient is seen and examined, events over the last 24 h noted .  lethargic on NIPPV  poorly responsive     Allergies:  No Known Allergies    Hospital Medications:   MEDICATIONS  (STANDING):    aspirin enteric coated 81 milliGRAM(s) Oral daily  atorvastatin 80 milliGRAM(s) Oral at bedtime  BACItracin   Ointment 1 Application(s) Topical four times a day  dexAMETHasone  Injectable 6 milliGRAM(s) IV Push daily  dextrose 50% Injectable 50 milliLiter(s) IV Push once  heparin  Infusion 900 Unit(s)/Hr (9 mL/Hr) IV Continuous <Continuous>  influenza   Vaccine 0.5 milliLiter(s) IntraMuscular once  insulin regular  human recombinant 10 Unit(s) IV Push once  lidocaine   Patch 1 Patch Transdermal daily  metoprolol tartrate 25 milliGRAM(s) Oral two times a day  nafcillin  IVPB 2 Gram(s) IV Intermittent every 4 hours  pantoprazole    Tablet 40 milliGRAM(s) Oral before breakfast  QUEtiapine 50 milliGRAM(s) Oral at bedtime  QUEtiapine 25 milliGRAM(s) Oral at bedtime  sodium chloride 0.9%. 1000 milliLiter(s) (50 mL/Hr) IV Continuous <Continuous>  sodium zirconium cyclosilicate 5 Gram(s) Oral two times a day        VITALS:  T(F): 96.1 (11-28-20 @ 06:11), Max: 97.4 (11-27-20 @ 19:00)  HR: 75 (11-28-20 @ 06:11)  BP: 113/66 (11-28-20 @ 06:11)  RR: 20 (11-28-20 @ 06:11)  SpO2: 93% (11-28-20 @ 07:32)      11-26 @ 07:01  -  11-27 @ 07:00  --------------------------------------------------------  IN: 617.7 mL / OUT: 1100 mL / NET: -482.3 mL    11-27 @ 07:01  -  11-28 @ 07:00  --------------------------------------------------------  IN: 1137 mL / OUT: 365 mL / NET: 772 mL    11-28 @ 07:01  -  11-28 @ 10:38  --------------------------------------------------------  IN: 177 mL / OUT: 0 mL / NET: 177 mL          PHYSICAL EXAM:  Constitutional: lethargic   Neck: No JVD  Respiratory: Crackles at base   Cardiovascular: S1, S2, RRR  Gastrointestinal: BS+, soft, NT/ND  Extremities: No cyanosis or clubbing. peripheral edema   :  positive ponce   Skin: No rashes    LABS:  11-28    130<L>  |  92<L>  |  160<HH>  ----------------------------<  97  6.3<HH>   |  19  |  4.7<HH>    Ca    7.3<L>      28 Nov 2020 06:21  Phos  6.8     11-27  Mg     3.1     11-27    TPro  4.5<L>  /  Alb  2.4<L>  /  TBili  2.5<H>  /  DBili      /  AST  29  /  ALT  6   /  AlkPhos  65  11-28                          11.8   24.00 )-----------( 119      ( 28 Nov 2020 06:21 )             35.5       Urine Studies:      RADIOLOGY & ADDITIONAL STUDIES:

## 2020-11-28 NOTE — GOALS OF CARE CONVERSATION - ADVANCED CARE PLANNING - CONVERSATION DETAILS
pts age medical history and current covid-19 condition  pt wishes to be full code at this time
Spoke with Daughter Bonny about pt condition. Daughter understands pt has been admitted for COVID requiring BiPAP now with SVITLANA requiring HD. Discussed with pt daughter, prognosis and MOLST form. Pt daughter states that her father didn't want to be on the breathing machine. She states she doesn't want her father to suffer and wants no escalation of care.   MOLST form signed. Pt made DNR DNI

## 2020-11-28 NOTE — CHART NOTE - NSCHARTNOTEFT_GEN_A_CORE
Subjective: pt s/e at bedside, tachypneic, in respiratory distress on BIPAP FiO2, 14/6, increased to 16/8. Pt getting HD at bedside.     Events:   The pt was receiving HD at bedside, became acutely hypoxic on BIPAP. He was given IV lasix 40 mg x1, ABG showed pt was hypoxic with paO2 46/CO2 29.6 pH 7.435. Called the pt's daughter (HCP) made her aware of patient's declining status. The pt's HCP would like everything done for now. The pt is lethargic and somewhat confused. Attempted to discuss intubation however pt unable to discuss due current clinic state.   The pt's BIPAP settings were altered to 16/8, maintaining FiO2 100%. The pt became tachycardic with episode of AF w/RVR.     1L of fluid was removed during HD.           Vital Signs Last 24 Hrs  T(C): 35.3 (28 Nov 2020 14:28), Max: 36.3 (27 Nov 2020 19:00)  T(F): 95.6 (28 Nov 2020 14:28), Max: 97.4 (27 Nov 2020 19:00)  HR: 122 (28 Nov 2020 18:15) (72 - 122)  BP: 102/65 (28 Nov 2020 18:15) (90/67 - 113/66)  BP(mean): 83 (28 Nov 2020 06:11) (83 - 83)  RR: 18 (28 Nov 2020 14:28) (18 - 20)  SpO2: 92% (28 Nov 2020 16:03) (92% - 96%)    I&O's Summary    27 Nov 2020 07:01  -  28 Nov 2020 07:00  --------------------------------------------------------  IN: 1137 mL / OUT: 365 mL / NET: 772 mL    28 Nov 2020 07:01  -  28 Nov 2020 18:40  --------------------------------------------------------  IN: 422 mL / OUT: 1170 mL / NET: -748 mL        Physical Exam:   General: lethargic, tachypneic, uncomfortable, on bipap  Cardiac: cardiac sounds obscured by labored breath sounds  Respiratory: respiratory distress, no wheeze, no rhonchi. Crackles appreciated at lung bases      LABS:   CARDIAC MARKERS ( 28 Nov 2020 06:21 )  x     / 0.04 ng/mL / x     / x     / x      CARDIAC MARKERS ( 27 Nov 2020 18:51 )  x     / 0.03 ng/mL / x     / x     / x      CARDIAC MARKERS ( 27 Nov 2020 12:27 )  x     / 0.04 ng/mL / x     / x     / x                                  11.8   24.00 )-----------( 119      ( 28 Nov 2020 06:21 )             35.5       11-28    126<L>  |  90<L>  |  166<HH>  ----------------------------<  100<H>  6.1<HH>   |  19  |  5.0<HH>    Ca    6.7<L>      28 Nov 2020 16:40  Phos  8.6     11-28  Mg     3.3     11-28    TPro  4.5<L>  /  Alb  2.4<L>  /  TBili  2.5<H>  /  DBili  x   /  AST  29  /  ALT  6   /  AlkPhos  65  11-28      PTT - ( 28 Nov 2020 06:21 )  PTT:66.6 sec          PLAN:  - Pt may require intubation, family made aware  - pt is s/p HD, f/u BMP in am  - c/w BIPAP, ativan and morphine prn for agitation  - rpt ABG in 2 hours Subjective: pt s/e at bedside, tachypneic, in respiratory distress on BIPAP FiO2, 14/6, increased to 16/8. Pt getting HD at bedside.     Events:   The pt was receiving HD at bedside, became acutely hypoxic on BIPAP. He was given IV lasix 40 mg x1, ABG showed pt was hypoxic with paO2 46/CO2 29.6 pH 7.435. Called the pt's daughter (HCP) made her aware of patient's declining status. The pt's HCP would like everything done for now. The pt is lethargic and somewhat confused. Attempted to discuss intubation however pt unable to discuss due current clinic state.   The pt's BIPAP settings were altered to 16/8, maintaining FiO2 100%. The pt became tachycardic with episode of AF w/RVR.     1L of fluid was removed during HD.           Vital Signs Last 24 Hrs  T(C): 35.3 (28 Nov 2020 14:28), Max: 36.3 (27 Nov 2020 19:00)  T(F): 95.6 (28 Nov 2020 14:28), Max: 97.4 (27 Nov 2020 19:00)  HR: 122 (28 Nov 2020 18:15) (72 - 122)  BP: 102/65 (28 Nov 2020 18:15) (90/67 - 113/66)  BP(mean): 83 (28 Nov 2020 06:11) (83 - 83)  RR: 18 (28 Nov 2020 14:28) (18 - 20)  SpO2: 92% (28 Nov 2020 16:03) (92% - 96%)    I&O's Summary    27 Nov 2020 07:01  -  28 Nov 2020 07:00  --------------------------------------------------------  IN: 1137 mL / OUT: 365 mL / NET: 772 mL    28 Nov 2020 07:01  -  28 Nov 2020 18:40  --------------------------------------------------------  IN: 422 mL / OUT: 1170 mL / NET: -748 mL        Physical Exam:   General: lethargic, tachypneic, uncomfortable, on bipap  Cardiac: cardiac sounds obscured by labored breath sounds  Respiratory: respiratory distress using accessory muscles of respiration, no wheeze, no rhonchi. Crackles appreciated at lung bases      LABS:   CARDIAC MARKERS ( 28 Nov 2020 06:21 )  x     / 0.04 ng/mL / x     / x     / x      CARDIAC MARKERS ( 27 Nov 2020 18:51 )  x     / 0.03 ng/mL / x     / x     / x      CARDIAC MARKERS ( 27 Nov 2020 12:27 )  x     / 0.04 ng/mL / x     / x     / x                                  11.8   24.00 )-----------( 119      ( 28 Nov 2020 06:21 )             35.5       11-28    126<L>  |  90<L>  |  166<HH>  ----------------------------<  100<H>  6.1<HH>   |  19  |  5.0<HH>    Ca    6.7<L>      28 Nov 2020 16:40  Phos  8.6     11-28  Mg     3.3     11-28    TPro  4.5<L>  /  Alb  2.4<L>  /  TBili  2.5<H>  /  DBili  x   /  AST  29  /  ALT  6   /  AlkPhos  65  11-28      PTT - ( 28 Nov 2020 06:21 )  PTT:66.6 sec          PLAN:  - Pt may require intubation, family made aware  - pt is s/p HD, f/u BMP in am  - c/w BIPAP, ativan and morphine prn for agitation  - rpt ABG in 2 hours

## 2020-11-29 LAB
-  AMPICILLIN/SULBACTAM: SIGNIFICANT CHANGE UP
-  CEFAZOLIN: SIGNIFICANT CHANGE UP
-  CLINDAMYCIN: SIGNIFICANT CHANGE UP
-  ERYTHROMYCIN: SIGNIFICANT CHANGE UP
-  GENTAMICIN: SIGNIFICANT CHANGE UP
-  OXACILLIN: SIGNIFICANT CHANGE UP
-  PENICILLIN: SIGNIFICANT CHANGE UP
-  RIFAMPIN: SIGNIFICANT CHANGE UP
-  TETRACYCLINE: SIGNIFICANT CHANGE UP
-  TRIMETHOPRIM/SULFAMETHOXAZOLE: SIGNIFICANT CHANGE UP
-  VANCOMYCIN: SIGNIFICANT CHANGE UP
CULTURE RESULTS: SIGNIFICANT CHANGE UP
FERRITIN SERPL-MCNC: 2819 NG/ML — HIGH (ref 30–400)
GRAM STN FLD: SIGNIFICANT CHANGE UP
METHOD TYPE: SIGNIFICANT CHANGE UP
ORGANISM # SPEC MICROSCOPIC CNT: SIGNIFICANT CHANGE UP
ORGANISM # SPEC MICROSCOPIC CNT: SIGNIFICANT CHANGE UP
SPECIMEN SOURCE: SIGNIFICANT CHANGE UP

## 2020-11-30 LAB
CULTURE RESULTS: SIGNIFICANT CHANGE UP
SPECIMEN SOURCE: SIGNIFICANT CHANGE UP

## 2020-12-02 LAB
CULTURE RESULTS: SIGNIFICANT CHANGE UP
SPECIMEN SOURCE: SIGNIFICANT CHANGE UP

## 2020-12-03 LAB
CULTURE RESULTS: SIGNIFICANT CHANGE UP
SPECIMEN SOURCE: SIGNIFICANT CHANGE UP

## 2022-05-14 NOTE — ED ADULT TRIAGE NOTE - NS ED TRIAGE AVPU SCALE
Alert-The patient is alert, awake and responds to voice. The patient is oriented to time, place, and person. The triage nurse is able to obtain subjective information.
NEGATIVE

## 2025-02-05 NOTE — DIETITIAN INITIAL EVALUATION ADULT. - NUTRITION DIAGNOSIS
Patient and wife are aware of the results.   ----- Message from Tim Castillo sent at 2/5/2025  9:10 AM EST -----  Cologuard test is negative.  He is advised to have repeat in 3 years  ----- Message -----  From: Lydia Delgadillo Results In  Sent: 2/5/2025   6:51 AM EST  To: Tim Castillo MD  
no...

## 2025-04-24 NOTE — PROGRESS NOTE ADULT - ASSESSMENT
Left VM for pt that upon review of her Consult info related to her Hyperparathyroidism Dr. Zuñiga / DARIEN Morrow are recommending she call and schedule a Thyroid / Parathyroid US & a 4D CT Parathyroid.  Instructed pt to call [phone] Central Scheduling asap to schedule her Imaging so the results will be available by her appt   74 yo male, pmh of AAA s/p repair, htn, hld, cad, presents to ed for sob and severe weakness, Pt was dx with covid-19  10 days ago, today felt more weak and sob, ems found the pt to be hypoxic    #acute hypoxic respiratory failure 2/2 SARS covid-19 pneumonia  #Bacteremia- blood culture grew staph aureus 11/22  - s/p remdesivir x 5 days, toci x 2 doses  - on HFNC 50L 100% and intermittent Bipap.   - CXR- stable  - wbc 33k--->31k-->29.25->24.82->30.23  - crp- 7.37->10.50 11/24  - ferritin - 1847 11/23  - procal -.84 11/23  - d-dimer -589 11/25  - dc nafcilin meropenem and caspofungin  - continue ancef 2g q8 (start 11/24)  - cont dexamethasone 6 mg daily  - cont hep gtt  - FU ID recs   - FU fungitel, blood cx  - fu inflammatory markers   - FU echo- r/o endocarditis  - FU xray cervical- see if there are any underlying inflammatory indicative of OM. patient is not stable enough for advance imaging.   - daily cxr and abg     #  A fib rate uncontrolled- new onset  - ecg - afib with RVR twave abnormality   - troponin <.01  - continue hep gtt  - continue metoprolol 5 mg iv q8h    # SVITLANA  - creatinine up 1.8- not improving  - dc NS 60 cc/hr  - held lisinopril  - nephro consulted    # GI PPx - Protonix   # DVT PPx - hep gtt   # Activity -  Increase as Tolerated    # Dispo -   Patient to be discharged when medically optimized.  # Code Status - FULL       guarded prognosis--patient is high risk for cardiopulmonary complicated due to COVID 19 infection  family Bonny (daugther) ---[phone]  updated on status- patient is still full code at the moment.    74 yo male, pmh of AAA s/p repair, htn, hld, cad, presents to ed for sob and severe weakness, Pt was dx with covid-19  10 days ago, today felt more weak and sob, ems found the pt to be hypoxic    #acute hypoxic respiratory failure 2/2 SARS covid-19 pneumonia  #Bacteremia- blood culture grew staph aureus 11/22  #septic shock - low BP, hypothermia, tachycardic  - s/p remdesivir x 5 days, toci x 2 doses  - on HFNC 50L 100% and intermittent Bipap.   - CXR- stable  - wbc 33k--->31k-->29.25->24.82->30.23  - crp- 7.37->10.50 11/24  - ferritin - 1847 11/23  - procal -.84 11/23  - d-dimer -589 11/25  - fungitell - <31  - dc nafcilin meropenem and caspofungin  - Given LR bolus 500cc  - continue ancef 2g q8 (start 11/24)  - cont dexamethasone 6 mg daily  - cont hep gtt  - FU ID recs   - FU blood cx  - fu inflammatory markers   - FU echo- r/o endocarditis  - FU xray cervical- see if there are any underlying inflammatory indicative of OM. patient is not stable enough for advance imaging.   - daily cxr and abg     #  A fib rate uncontrolled- new onset  - ecg - afib with RVR twave abnormality   - troponin <.01  - continue hep gtt  - continue metoprolol 5 mg iv q8h    # SVITLANA  - creatinine up 1.8- not improving  - dc NS 60 cc/hr  - held lisinopril  - nephro consulted    # GI PPx - Protonix   # DVT PPx - hep gtt   # Activity -  Increase as Tolerated    # Dispo -   Patient to be discharged when medically optimized.  # Code Status - FULL       guarded prognosis--patient is high risk for cardiopulmonary complicated due to COVID 19 infection  family Bonny (daugther) ---[phone]  updated on status- patient is still full code at the moment.    76 yo male, pmh of AAA s/p repair, htn, hld, cad, presents to ed for sob and severe weakness, Pt was dx with covid-19  10 days ago, today felt more weak and sob, ems found the pt to be hypoxic    #acute hypoxic respiratory failure 2/2 SARS covid-19 pneumonia  #Bacteremia- blood culture grew staph aureus 11/22  #septic shock - low BP, hypothermia, tachycardic  - s/p remdesivir x 5 days, toci x 2 doses  - on HFNC 50L 100% and intermittent Bipap.   - CXR- stable  - wbc 33k--->31k-->29.25->24.82->30.23  - crp- 7.37->10.50 11/24  - ferritin - 1847 11/23  - procal -.84 11/23  - d-dimer -589 11/25  - fungitell - <31  - dc nafcilin meropenem and caspofungin  - Given LR bolus 500cc  - continue ancef 2g q8 (start 11/24)  - cont dexamethasone 6 mg daily  - cont hep gtt  - FU ID recs   - FU blood cx  - fu inflammatory markers   - FU echo- r/o endocarditis  - FU xray cervical- see if there are any underlying inflammatory indicative of OM. patient is not stable enough for advance imaging.   - daily cxr and abg     #  A fib rate uncontrolled- new onset  - ecg - afib with RVR twave abnormality   - troponin <.01  - continue hep gtt  - continue metoprolol 5 mg iv q8h    # SVITLANA  - creatinine up 1.8- not improving  - dc NS 60 cc/hr  - held lisinopril  - nephro consulted    # GI PPx - Protonix   # DVT PPx - hep gtt   # Activity -  Increase as Tolerated    # Dispo -   Patient to be discharged when medically optimized.  # Code Status - FULL       guarded prognosis--patient is high risk for cardiopulmonary complicated due to COVID 19 infection  family Bonny (daugther) ---[phone]  updated on status- patient is still full code at the moment.

## 2025-04-29 NOTE — ED PROVIDER NOTE - INPATIENT RESIDENT/ACP NOTIFIED DATE
Chronic, stable  Last LDL 84 with a goal  Continue home medication pravastatin 40 mg nightly and Zetia 10 mg nightly   10-Nov-2020 12:51